# Patient Record
Sex: FEMALE | Race: WHITE | Employment: OTHER | URBAN - METROPOLITAN AREA
[De-identification: names, ages, dates, MRNs, and addresses within clinical notes are randomized per-mention and may not be internally consistent; named-entity substitution may affect disease eponyms.]

---

## 2021-05-05 RX ORDER — CLINDAMYCIN PHOSPHATE 10 UG/ML
LOTION TOPICAL 2 TIMES DAILY
COMMUNITY

## 2021-05-05 RX ORDER — BACLOFEN 20 MG/1
20 TABLET ORAL 4 TIMES DAILY
COMMUNITY
End: 2021-06-30

## 2021-05-05 RX ORDER — CALCIUM CARB/VITAMIN D3/VIT K1 500-500-40
TABLET,CHEWABLE ORAL DAILY
COMMUNITY

## 2021-05-05 RX ORDER — METOCLOPRAMIDE HYDROCHLORIDE 5 MG/5ML
10 SOLUTION ORAL
Status: ON HOLD | COMMUNITY
End: 2021-09-04

## 2021-05-05 RX ORDER — CETIRIZINE HYDROCHLORIDE 10 MG/1
10 TABLET ORAL DAILY
COMMUNITY
End: 2021-06-30

## 2021-05-05 RX ORDER — ALBUTEROL SULFATE 2.5 MG/3ML
2.5 SOLUTION RESPIRATORY (INHALATION) 4 TIMES DAILY
Status: ON HOLD | COMMUNITY
End: 2021-09-03

## 2021-05-05 RX ORDER — MONTELUKAST SODIUM 10 MG/1
10 TABLET ORAL DAILY
COMMUNITY

## 2021-05-05 RX ORDER — MIRTAZAPINE 15 MG/1
15 TABLET, FILM COATED ORAL NIGHTLY
COMMUNITY
End: 2021-06-30

## 2021-05-05 RX ORDER — BUDESONIDE 0.5 MG/2ML
1 INHALANT ORAL 2 TIMES DAILY
Status: ON HOLD | COMMUNITY
End: 2021-09-03

## 2021-05-05 RX ORDER — POLYETHYLENE GLYCOL 3350 17 G/17G
17 POWDER, FOR SOLUTION ORAL 2 TIMES DAILY
COMMUNITY
End: 2021-06-30

## 2021-05-05 RX ORDER — LISINOPRIL 2.5 MG/1
2.5 TABLET ORAL DAILY
COMMUNITY
End: 2021-06-30

## 2021-05-05 RX ORDER — PSYLLIUM HUSK 3.4 G/5.8G
1 POWDER ORAL 2 TIMES DAILY
Status: ON HOLD | COMMUNITY
End: 2021-09-03

## 2021-05-05 RX ORDER — DIPHENHYDRAMINE HCL 12.5MG/5ML
12.5 LIQUID (ML) ORAL NIGHTLY PRN
Status: ON HOLD | COMMUNITY
End: 2021-09-03 | Stop reason: ALTCHOICE

## 2021-05-05 RX ORDER — POTASSIUM CHLORIDE 20MEQ/15ML
20 LIQUID (ML) ORAL DAILY
COMMUNITY
End: 2021-06-30

## 2021-05-05 RX ORDER — MESALAMINE 1000 MG/1
1000 SUPPOSITORY RECTAL
COMMUNITY

## 2021-05-05 RX ORDER — TRAZODONE HYDROCHLORIDE 50 MG/1
100 TABLET ORAL NIGHTLY
COMMUNITY
End: 2021-06-30

## 2021-05-05 RX ORDER — GLYCOPYRROLATE 1 MG/5ML
1 SOLUTION ORAL 3 TIMES DAILY
COMMUNITY

## 2021-05-05 RX ORDER — PRAZOSIN HYDROCHLORIDE 2 MG/1
2 CAPSULE ORAL NIGHTLY
COMMUNITY
End: 2021-06-30

## 2021-06-02 ENCOUNTER — OFFICE VISIT (OUTPATIENT)
Dept: GERIATRIC MEDICINE | Age: 36
End: 2021-06-02
Payer: MEDICAID

## 2021-06-02 DIAGNOSIS — G80.9 INFANTILE CEREBRAL PALSY (HCC): ICD-10-CM

## 2021-06-02 DIAGNOSIS — R13.12 OROPHARYNGEAL DYSPHAGIA: Primary | ICD-10-CM

## 2021-06-02 DIAGNOSIS — F39 MOOD DISORDER (HCC): ICD-10-CM

## 2021-06-02 DIAGNOSIS — K21.9 GASTROESOPHAGEAL REFLUX DISEASE WITHOUT ESOPHAGITIS: ICD-10-CM

## 2021-06-02 DIAGNOSIS — F79 INTELLECTUAL DISABILITY: ICD-10-CM

## 2021-06-02 DIAGNOSIS — K59.00 CONSTIPATION, UNSPECIFIED CONSTIPATION TYPE: ICD-10-CM

## 2021-06-02 DIAGNOSIS — Z93.0 TRACHEOSTOMY DEPENDENT (HCC): ICD-10-CM

## 2021-06-02 DIAGNOSIS — Z99.3 DEPENDENCE ON WHEELCHAIR: ICD-10-CM

## 2021-06-02 PROCEDURE — 99310 SBSQ NF CARE HIGH MDM 45: CPT | Performed by: PHYSICIAN ASSISTANT

## 2021-06-23 ENCOUNTER — VIRTUAL VISIT (OUTPATIENT)
Dept: GERIATRIC MEDICINE | Age: 36
End: 2021-06-23
Payer: MEDICAID

## 2021-06-23 DIAGNOSIS — Z93.1 PRESENCE OF EXTERNALLY REMOVABLE PERCUTANEOUS ENDOSCOPIC GASTROSTOMY (PEG) TUBE (HCC): ICD-10-CM

## 2021-06-23 DIAGNOSIS — R11.15 EMESIS, PERSISTENT: Primary | ICD-10-CM

## 2021-06-23 PROCEDURE — 99308 SBSQ NF CARE LOW MDM 20: CPT | Performed by: PHYSICIAN ASSISTANT

## 2021-06-25 ENCOUNTER — OFFICE VISIT (OUTPATIENT)
Dept: GERIATRIC MEDICINE | Age: 36
End: 2021-06-25
Payer: MEDICAID

## 2021-06-25 DIAGNOSIS — R10.84 GENERALIZED ABDOMINAL PAIN: Primary | ICD-10-CM

## 2021-06-25 PROCEDURE — 99308 SBSQ NF CARE LOW MDM 20: CPT | Performed by: PHYSICIAN ASSISTANT

## 2021-06-30 PROBLEM — B35.1 ONYCHOMYCOSIS: Status: ACTIVE | Noted: 2019-05-30

## 2021-06-30 PROBLEM — R45.851 SUICIDAL IDEATIONS: Status: ACTIVE | Noted: 2021-06-16

## 2021-06-30 PROBLEM — K85.90 PANCREATITIS: Status: ACTIVE | Noted: 2017-05-22

## 2021-06-30 PROBLEM — K64.9 UNSPECIFIED HEMORRHOIDS: Status: ACTIVE | Noted: 2020-10-16

## 2021-06-30 PROBLEM — Z88.0 ALLERGY STATUS TO PENICILLIN: Status: ACTIVE | Noted: 2020-10-16

## 2021-06-30 PROBLEM — Z93.0 TRACHEOSTOMY STATUS (HCC): Status: ACTIVE | Noted: 2020-10-16

## 2021-06-30 PROBLEM — Z87.19 PERSONAL HISTORY OF OTHER DISEASES OF THE DIGESTIVE SYSTEM: Status: ACTIVE | Noted: 2020-10-16

## 2021-06-30 PROBLEM — K59.00 CONSTIPATION: Status: ACTIVE | Noted: 2019-06-13

## 2021-06-30 PROBLEM — Z87.440 PERSONAL HISTORY OF URINARY (TRACT) INFECTIONS: Status: ACTIVE | Noted: 2020-10-16

## 2021-06-30 PROBLEM — R63.4 UNINTENTIONAL WEIGHT LOSS: Status: ACTIVE | Noted: 2021-01-20

## 2021-06-30 PROBLEM — L84 FOOT CALLUS: Status: ACTIVE | Noted: 2019-05-30

## 2021-06-30 PROBLEM — F39 MOOD DISORDER (HCC): Status: ACTIVE | Noted: 2020-02-25
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
## 2021-06-30 PROBLEM — F32.9 MAJOR DEPRESSIVE DISORDER, SINGLE EPISODE, UNSPECIFIED: Status: ACTIVE | Noted: 2021-03-05

## 2021-06-30 PROBLEM — Z99.3 DEPENDENCE ON WHEELCHAIR: Status: ACTIVE | Noted: 2020-10-16

## 2021-06-30 PROBLEM — R11.2 NAUSEA AND VOMITING: Status: ACTIVE | Noted: 2019-06-13

## 2021-06-30 PROBLEM — G80.9 INFANTILE CEREBRAL PALSY (HCC): Status: ACTIVE | Noted: 2021-03-05

## 2021-06-30 PROBLEM — K62.3 RECTAL PROLAPSE: Status: ACTIVE | Noted: 2020-10-16

## 2021-06-30 RX ORDER — FAMOTIDINE 40 MG/1
TABLET, FILM COATED ORAL
COMMUNITY
Start: 2020-09-09

## 2021-06-30 RX ORDER — KETOCONAZOLE 20 MG/G
CREAM TOPICAL 2 TIMES DAILY
COMMUNITY
Start: 2021-05-24

## 2021-06-30 RX ORDER — DOCUSATE SODIUM 50 MG/5ML
100 LIQUID ORAL 2 TIMES DAILY
COMMUNITY
Start: 2020-09-09 | End: 2021-06-30

## 2021-06-30 RX ORDER — MIRTAZAPINE 15 MG/1
15 TABLET, FILM COATED ORAL NIGHTLY
COMMUNITY
Start: 2021-03-09

## 2021-06-30 RX ORDER — POTASSIUM CHLORIDE 40 MEQ/30ML
15 LIQUID ORAL DAILY
COMMUNITY

## 2021-06-30 RX ORDER — MULTIVITAMIN
TABLET ORAL
Status: ON HOLD | COMMUNITY
Start: 2021-03-09 | End: 2021-09-03 | Stop reason: ALTCHOICE

## 2021-06-30 RX ORDER — DIPHENHYDRAMINE HCL 12.5MG/5ML
12.5 LIQUID (ML) ORAL NIGHTLY
Status: ON HOLD | COMMUNITY
Start: 2020-09-09 | End: 2021-09-03

## 2021-06-30 RX ORDER — BACLOFEN 20 MG/1
TABLET ORAL
Status: ON HOLD | COMMUNITY
Start: 2020-09-09 | End: 2021-09-07 | Stop reason: HOSPADM

## 2021-06-30 RX ORDER — POLYETHYLENE GLYCOL 3350 17 G/17G
17 POWDER, FOR SOLUTION ORAL 2 TIMES DAILY
Status: ON HOLD | COMMUNITY
Start: 2020-09-09 | End: 2021-09-03

## 2021-06-30 RX ORDER — SENNA PLUS 8.6 MG/1
8.6 TABLET ORAL NIGHTLY
Status: ON HOLD | COMMUNITY
Start: 2021-03-10 | End: 2021-09-03

## 2021-06-30 RX ORDER — FLUTICASONE PROPIONATE 50 MCG
1 SPRAY, SUSPENSION (ML) NASAL DAILY
COMMUNITY
Start: 2020-09-09

## 2021-06-30 RX ORDER — RISPERIDONE 2 MG/1
2 TABLET, FILM COATED ORAL NIGHTLY
Status: ON HOLD | COMMUNITY
Start: 2021-03-09 | End: 2021-09-07 | Stop reason: HOSPADM

## 2021-06-30 RX ORDER — FLUOXETINE 10 MG/1
30 CAPSULE ORAL DAILY
Status: ON HOLD | COMMUNITY
Start: 2021-03-09 | End: 2021-09-07 | Stop reason: HOSPADM

## 2021-06-30 RX ORDER — PRAZOSIN HYDROCHLORIDE 2 MG/1
2 CAPSULE ORAL NIGHTLY
COMMUNITY
Start: 2021-03-09

## 2021-06-30 RX ORDER — MESALAMINE 1000 MG/1
SUPPOSITORY RECTAL
COMMUNITY
Start: 2021-01-07 | End: 2021-06-30

## 2021-06-30 RX ORDER — HYDROXYZINE HYDROCHLORIDE 25 MG/1
25 TABLET, FILM COATED ORAL 3 TIMES DAILY PRN
Status: ON HOLD | COMMUNITY
Start: 2020-10-01 | End: 2021-09-03

## 2021-06-30 RX ORDER — ERGOCALCIFEROL (VITAMIN D2) 200 MCG/ML
1040 DROPS ORAL DAILY
Status: ON HOLD | COMMUNITY
Start: 2020-09-09 | End: 2021-09-04

## 2021-06-30 RX ORDER — ALBUTEROL SULFATE 2.5 MG/3ML
2.5 SOLUTION RESPIRATORY (INHALATION) EVERY 6 HOURS PRN
Status: ON HOLD | COMMUNITY
End: 2021-09-03

## 2021-06-30 RX ORDER — TRAZODONE HYDROCHLORIDE 50 MG/1
100 TABLET ORAL NIGHTLY
COMMUNITY
Start: 2021-03-09

## 2021-06-30 RX ORDER — LISINOPRIL 2.5 MG/1
2.5 TABLET ORAL DAILY
COMMUNITY
Start: 2020-09-09

## 2021-06-30 RX ORDER — ESCITALOPRAM OXALATE 10 MG/1
10 TABLET ORAL DAILY
Status: ON HOLD | COMMUNITY
End: 2021-09-03

## 2021-06-30 RX ORDER — CETIRIZINE HYDROCHLORIDE 10 MG/1
TABLET ORAL
COMMUNITY
Start: 2021-04-06 | End: 2021-07-04

## 2021-06-30 NOTE — PROGRESS NOTES
have ongoing IDD, however she is able to answer yes and no questions very well. Does seem alert and oriented x3 upon prompting a question. Limited cognition. Will need prison care via POA for care and financial decisions. Patient Active Problem List   Diagnosis    Acute blood loss anemia    Allergy status to penicillin    Constipation    Dependence on wheelchair    DVT prophylaxis    Dysphagia    Exotropia of right eye    Foot callus    Gastrostomy status (HCC)    GERD (gastroesophageal reflux disease)    GI bleeding    [facility]ile [facility] palsy ([facility])    Intellectual disability    Major depressive disorder, single episode, unspecified    Malnutrition of mild degree ([facility])    MDRO (multiple drug resistant organisms) resistance    Mood disorder ([facility])    Nausea and vomiting    Obstructive sleep apnea (adult) (pediatric)    Onychomycosis    PEG tube malfunction ([facility])    Pancreatitis    Personal history of other diseases of the digestive system    Personal history of urinary (tract) infections    Intentional self-harm by unspecified sharp object, initial encounter ([address]    Rectal prolapse    Squamous cell papilloma of anal canal    Suicidal ideations    Tracheostomy dependent ([address]    [facility] status [facility]    Underweight    Unintentional weight loss    Unspecified hemorrhoids     No past medical history on file. No past surgical history on file.   Social History     Socioeconomic History    Marital status: Unknown     Spouse name: Not on file    Number of children: Not on file    Years of education: Not on file    Highest education level: Not on file   Occupational History    Not on file   Tobacco Use    Smoking status: Not on file   Substance and Sexual Activity    Alcohol use: Not on file    Drug use: Not on file    Sexual activity: Not on file   Other Topics Concern    Not on file   Social History Narrative    Not on file     Social Determinants of Health Financial Resource Strain:     Difficulty of Paying Living Expenses:    Food Insecurity:     Worried About Running Out of Food in the Last Year:     [address] in the Last Year:    Transportation Needs:     Lack of Transportation (Medical):  Lack of Transportation (Non-Medical):    Physical Activity:     Days of Exercise per Week:     Minutes of Exercise per Session:    Stress:     Feeling of Stress :    Social Connections:     Frequency of Communication with Friends and Family:     Frequency of Social Gatherings with Friends and Family:     Attends Congregation Services:     Active Member of Clubs or Organizations:     Attends Club or Organization Meetings:     Marital Status:    Intimate Partner Violence:     Fear of Current or Ex-Partner:     Emotionally Abused:     Physically Abused:     Sexually Abused:      No family history on file. Allergies   Allergen Reactions    Amoxicillin-Pot Clavulanate      Other reaction(s): Unknown    Ibuprofen      Other reaction(s): Unknown, Unknown    Penicillins Hives     Other reaction(s): Unknown    Ondansetron Rash     blotchy arrythmia noted to right arm after giving IV zofran 4/24/2015  blotchy arrythmia noted to right arm after giving IV zofran 4/24/2015      Vancomycin Rash    Amoxicillin     Eggs-Apples-Oats [Alimentum]     Food Other (See Comments)    Milk-Related Compounds     Orange Juice [Orange Oil]     Strawberry (Diagnostic)          Review of Systems   Unable to perform ROS: Patient nonverbal       Objective:     /74  Pulse 80  Temp 97.7 °F (Temporal)  Resp 20  Wt 81 lbs    Physical Exam  Constitutional:       General: She is not in acute distress. Appearance: She is not ill-appearing or diaphoretic. Comments: Thin, low muscle tone   HENT:      Head: Normocephalic and atraumatic. Nose: Nose normal. No congestion or rhinorrhea.       Mouth/Throat:      Mouth: Mucous membranes are moist.      Pharynx: types were placed in this encounter. No orders of the defined types were placed in this encounter. 1.  Continue current diet orders. Will reduce to PEG tube feedings if necessary if oropharyngeal dysphagia becomes problematic. 2.  No new changes to medications. Continue famotidine as ordered. 3.  Full jail care. 4.  Utilize as needed's as needed. Stool softeners and MiraLAX. 5.  Patient is wheelchair-bound but able to ambulate on her own power. Assist as needed. 6.  See #3  7. No new acute issues or behaviors to interrupt care at this time. Easily redirected. Monitor and utilize psych 360 service as needed. 8.  Continue current tracheostomy care. Maintain patency. Utilize normal saline to irrigate and suction as needed. Monitor SPO2 daily. No follow-ups on file. Side effects, adverse effects of the medication prescribed today, as well as treatment plan and result expectations have been discussed withthe patient who expresses understanding and desires to proceed.     [facility]

## 2021-07-22 ENCOUNTER — OFFICE VISIT (OUTPATIENT)
Dept: GERIATRIC MEDICINE | Age: 36
End: 2021-07-22
Payer: MEDICAID

## 2021-07-22 DIAGNOSIS — R13.12 OROPHARYNGEAL DYSPHAGIA: ICD-10-CM

## 2021-07-22 DIAGNOSIS — K21.9 GASTROESOPHAGEAL REFLUX DISEASE WITHOUT ESOPHAGITIS: ICD-10-CM

## 2021-07-22 DIAGNOSIS — Z87.19 HISTORY OF GASTROINTESTINAL BLEEDING: ICD-10-CM

## 2021-07-22 DIAGNOSIS — Z93.0 TRACHEOSTOMY IN PLACE (HCC): Primary | ICD-10-CM

## 2021-07-22 PROCEDURE — 99309 SBSQ NF CARE MODERATE MDM 30: CPT | Performed by: PHYSICIAN ASSISTANT

## 2021-07-28 DIAGNOSIS — R13.10 DYSPHAGIA, UNSPECIFIED TYPE: Primary | ICD-10-CM

## 2021-08-01 NOTE — PROGRESS NOTES
Gertrude Sepulveda is a 43 y.o. female evaluated via telephone on 2021. Consent:  She and/or health care decision maker is aware that that she may receive a bill for this telephone service, depending on her insurance coverage, and has provided verbal consent to proceed: Yes      Documentation:  I communicated with the patient and/or health care decision maker about     PATIENT: Linette Berger  : 1985  DOS:   A [facility] serving [place] and [address]  Patient having acute emesis noted today and seems to have some nausea as well. She has been unable to establish dialog, but can answer some questions when asked yes or no questions. She is on a pureed dietcurrently and is having emesis afterwards. Patient is eating a modified diet with supplementation  via PEG tube to enhance protein intake however, she is having increased vomitus and some mildnausea. We will add Zofran 4 mg p.o. q.6 hours x3 days. ADDENDUM: Patient nurse did call back a few hours later stating that patient does actually ve a Zofran allergy and is currently on baclofen. Stated at this time we will monitor patient. If she has continued emesis and nausea, we will follow up with GI. She is able to keep most of her meds down. She takes her meds p.o. If patient has another episode of emesis or if she cannot keep her meds down, we will do further imaging evaluation. Electronically Signed By: Sukhdev Chavez PA-C on 2021 16:32:57  ______________________________  Sukhdev Chavez PA-C [id number]  D: 2021 16:43:07  T: 2021 11:23:46  cc: - Ryder. Details of this discussion including any medical advice provided: see above      I affirm this is a Patient Initiated Episode with a Patient who has not had a related appointment within my department in the past 7 days or scheduled within the next 24 hours.     Patient identification was verified at the start of the visit: Yes    Total Time: minutes: 11-20 minutes    The visit was conducted pursuant to the emergency declaration under the [address] [place] [address] and the [employer] and [employer] General Act. Patient identification was verified, and a caregiver was present when appropriate. The patient was located in a state where the provider was credentialed to provide care.     Note: not billable if this call serves to triage the patient into an appointment for the relevant concern      Sean Mcneil

## 2021-08-03 NOTE — PROGRESS NOTES
Jakub Hameed     : 1985 DOS:     A [facility] serving [address] and [address]    Vital signs reviewed at facility, within normal limits. HPI: Patient having difficulty with bowel movements and constipation over the past few days. Has had some mild nausea as well. Is on PEG tube feeding due to very severe dysphagia and  tracheostomy in place. Patient believes it is from being hungry and not necessarily from  constipation, although it is impossible to discern exactly as patient is nonverbal. She can answer  yes or no questions and makes most of her needs known fairly well via hand signs and repeat  questioning. She has not had a bowel movement in 2 days despite milk of mag. We will  improve enema orders today. She has not been eating very much due to some abdominal pain as  well. Evaluated KUB yesterday showing normal bowel gas pattern and nonobstructive at that. We will continue some milk of mag and an enema today if no bowel movement. Otherwise, we  will continue monitoring for pain. We will continue doing fluid via PEG tube as ordered. Currently holding PEG tube feed for the time being. We will restart as soon as patient has  adequate BM. MEDICATIONS: Reviewed. ALLERGIES: Reviewed. REVIEW OF SYSTEMS: See HPI. Some mild increase in weakness and fatigue secondary to  holding PEG tube. There was no headache, confusion, reportedly. No BRBPR. No urinary  complaints. Denies any cardiopulmonary complaints as well. Remaining 14-point ROS  Unremarkable. PHYSICAL EXAMINATION: GENERAL: Poor-to-fair hygiene, secondary to medical  conditions/tracheostomy. HEENT: Pupils equal, round, react to light equally. MMM, no  erythema or exudate. Does have tracheostomy with some mild discharge noted from site. Clear  white discharge. Trachea is midline. CARDIOPULMONARY: RRR. No leg edema. Lung  sounds are clear.  However, difficulty auscultating due to tracheostomy, aberrant noise. ABDOMINAL: Slightly hypoactive bowel sounds; however, appear to be more or less normal.    MENTAL STATUS: Patient is awake, alert, and oriented 2/3, presumably unable to establish  dialog due to patient being nonverbal.    ASSESSMENT AND PLAN:    1. Abdominal pain-KUB appears to be within normal limits. We will do further imaging if  no response to MOM or enema. Consultation-we will add enema orders. We will do 1  stat this evening if no bowel movement within the next 1 to 2 hours. Continue holding  PEG tube. Utilize flushes as needed for hydration.     [address] [facility]  [address]  [facility]  Phone: [phone]  Fax: [phone]    PATIENT: Cuate Higgins    : 1985  DOS:     [address] [facility] serving [place] and [place]      Electronically Signed By: Noel Banks PA-C on 2021 10:00:15  ______________________________  Noel Banks PA-C  [id number]  D: 2021 18:41:04  T: 2021 15:34:19  cc: - Kb Mail

## 2021-08-17 NOTE — PROGRESS NOTES
Subjective:      Patient ID: Tracey Tineo is a pleasant 43 y.o. female who presents today for:  No chief complaint on file. Patient seen today for monthly follow-up visit. Patient is a fairly new addition to breast care [facility]. Vital signs today 97.3 degrees F, /84, SPO2 95% on room air, pulse 110, respirations 18. Weight 79.8, in June 76.6 pounds. Patient has history of going tracheostomy. Currently not using tubing. Has issues with mishandling tracheostomy and prodding with her so fingers. Has to redirected frequently. Patient is afebrile and has no new fever or evidence of infection. Patient does have some moderate amount of thickened sputum coming from area. She is an increase dysphasia risk for discover this morning more as of late. We will do modified barium swallow. Discussed having her be n.p.o. and PEG tube, and patient was decently opposed to her body language, motioning that she would never want this. We reassured her that it was her choice and she is alert and oriented 03 and can make her own decision. Patient is otherwise doing well without acute issue. No new GI bleeding or GERD related symptoms. Denies indigestion or pain with eating her current modified puréed diet. .  No new bowel or bladder issues reportedly. We will continue monitoring patient for any new issues. In the meantime we will weightbearing swallow and adjust diet as needed. Patient has refused PEG tube at this time.       Patient Active Problem List   Diagnosis    Acute blood loss anemia    Allergy status to penicillin    Constipation    Dependence on wheelchair    DVT prophylaxis    Dysphagia    Exotropia of right eye    Foot callus    [facility]astrostomy status ([facility])    GERD (gastroesophageal reflux disease)    GI bleeding    Infantile cerebral palsy (HCC)    Intellectual disability    Major depressive disorder, single episode, unspecified    Mal[facility] mild degree [facility]    MDRO (multiple drug resistant organisms) resistance    Mood disorder (HCC)    Nausea and vomiting    Obstructive sleep apnea (adult) (pediatric)    Onychomycosis    PEG tube malfunction (HCC)    Pancreatitis    Personal history of other diseases of the digestive system    Personal history of urinary (tract) infections    Intentional self-harm by unspecified sharp object, initial encounter ([facility]    Rectal prolapse    Squamous cell papilloma of anal canal    Suicidal ideations    Tracheostomy dependent ([facility]    Tracheostomy status ([facility]    Underweight    Unintentional weight loss    Unspecified hemorrhoids     No past medical history on file. No past surgical history on file. Social History     Socioeconomic History    Marital status: Unknown     Spouse name: Not on file    Number of children: Not on file    Years of education: Not on file    Highest education level: Not on file   Occupational History    Not on file   Tobacco Use    Smoking status: Not on file   Substance and Sexual Activity    Alcohol use: Not on file    Drug use: Not on file    Sexual activity: Not on file   Other Topics Concern    Not on file   Social History Narrative    Not on file     Social Determinants of Health     Financial Resource Strain:     Difficulty of Paying Living Expenses:    Food Insecurity:     Worried About Running Out of Food in the Last Year:     [address] in the Last Year:    Transportation Needs:     Lack of Transportation (Medical):      Lack of Transportation (Non-Medical):    Physical Activity:     Days of Exercise per Week:     Minutes of Exercise per Session:    Stress:     Feeling of Stress :    Social Connections:     Frequency of Communication with Friends and Family:     Frequency of Social Gatherings with Friends and Family:     Attends Sikh Services:     Active Member of Clubs or Organizations:     Attends Club or Organization Meetings:     Marital Status:    Intimate Partner Violence:     Fear of Current or Ex-Partner:     Emotionally Abused:     Physically Abused:     Sexually Abused:      No family history on file. Allergies   Allergen Reactions    Amoxicillin-Pot Clavulanate      Other reaction(s): Unknown    Ibuprofen      Other reaction(s): Unknown, Unknown    Penicillins Hives     Other reaction(s): Unknown    Ondansetron Rash     blotchy arrythmia noted to right arm after giving IV zofran 4/24/2015  blotchy arrythmia noted to right arm after giving IV zofran 4/24/2015      Vancomycin Rash    Amoxicillin     Eggs-Apples-Oats [Alimentum]     Food Other (See Comments)    Milk-Related Compounds     Orange Juice [Orange Oil]     Strawberry (Diagnostic)          Review of Systems  Monitor established patient ability to establish dialogue. Objective:   Vital signs: Patient vitals in HPI. Physical Exam  Vitals reviewed. Constitutional:       General: She is not in acute distress. Appearance: She is not ill-appearing or diaphoretic. Comments: Thin, low muscle tone   HENT:      Head: Normocephalic and atraumatic. Nose: Nose normal. No congestion or rhinorrhea. Mouth/Throat:      Mouth: Mucous membranes are moist.      Pharynx: Oropharynx is clear. Comments: Drooling profusely  Does have presence of tracheostomy, no tracheostomy tubing present. Moderate amount of thick yellow sputum noted  Eyes:      General: No scleral icterus. Right eye: No discharge. Left eye: No discharge. Extraocular Movements: Extraocular movements intact. Conjunctiva/sclera: Conjunctivae normal.      Pupils: Pupils are equal, round, and reactive to light. Cardiovascular:      Rate and Rhythm: Normal rate and regular rhythm. Pulses: Normal pulses. Heart sounds: Normal heart sounds. Pulmonary:      Effort: Pulmonary effort is normal. No respiratory distress. Breath sounds: No wheezing.       Comments: Tracheostomy noise throughout exam.  Unable to ascertain lung exam accurately. Moderate amount of sputum coming from tracheostomy site. Chest:      Chest wall: No tenderness. Abdominal:      General: Abdomen is flat. Bowel sounds are normal. There is no distension. Comments: PEG tube mild redness around site; no discharge noted. Genitourinary:     Comments: Deferred  Musculoskeletal:         General: No tenderness. Normal range of motion. Cervical back: Normal range of motion and neck supple. No rigidity or tenderness. Right lower leg: No edema. Left lower leg: No edema. Skin:     General: Skin is warm and dry. Findings: No bruising. Neurological:      Mental Status: She is alert. Mental status is at baseline. Motor: Weakness present. Gait: Gait abnormal (Wheelchair use routine). Comments: Unable to accurately ascertain a & O   Psychiatric:         Mood and Affect: Mood normal.      Comments: Sexually aggressive behavior; redirectable           Assessment:       Diagnosis Orders   1. Tracheostomy in place [facility])     2. Oropharyngeal dysphagia     3. History of gastrointestinal bleeding     4. Gastroesophageal reflux disease without esophagitis           Plan:      No orders of the defined types were placed in this encounter. No orders of the defined types were placed in this encounter. 1.  Patent and SPO2 within normal limits. Patient will refuse to keep tracheostomy equipment in place. Continue monitoring. 2.  Continue puréed diet. Will do barium swallow to evaluate. Patient refuses PEG tube. 3.  No new bleeding. Monitor for change in stool. 4.  No new changes. No follow-ups on file. Side effects, adverse effects of the medication prescribed today, as well as treatment plan and result expectations have beendiscussed with the patient who expresses understanding and desires to proceed.     Sean Aguiar

## 2021-08-20 ENCOUNTER — INITIAL CONSULT (OUTPATIENT)
Dept: GERIATRIC MEDICINE | Age: 36
End: 2021-08-20
Payer: MEDICAID

## 2021-08-20 DIAGNOSIS — F33.9 RECURRENT MAJOR DEPRESSIVE DISORDER, REMISSION STATUS UNSPECIFIED (HCC): ICD-10-CM

## 2021-08-20 DIAGNOSIS — R13.12 OROPHARYNGEAL DYSPHAGIA: Primary | ICD-10-CM

## 2021-08-20 DIAGNOSIS — K94.23 PEG TUBE MALFUNCTION (HCC): ICD-10-CM

## 2021-08-20 DIAGNOSIS — F39 MOOD DISORDER (HCC): ICD-10-CM

## 2021-08-20 PROCEDURE — 99309 SBSQ NF CARE MODERATE MDM 30: CPT | Performed by: PHYSICIAN ASSISTANT

## 2021-08-30 ENCOUNTER — OFFICE VISIT (OUTPATIENT)
Dept: GASTROENTEROLOGY | Age: 36
End: 2021-08-30
Payer: MEDICAID

## 2021-08-30 VITALS
RESPIRATION RATE: 12 BRPM | SYSTOLIC BLOOD PRESSURE: 110 MMHG | DIASTOLIC BLOOD PRESSURE: 70 MMHG | OXYGEN SATURATION: 98 % | HEART RATE: 84 BPM

## 2021-08-30 DIAGNOSIS — K62.5 BLEEDING PER RECTUM: Primary | ICD-10-CM

## 2021-08-30 PROCEDURE — 99204 OFFICE O/P NEW MOD 45 MIN: CPT | Performed by: INTERNAL MEDICINE

## 2021-08-30 RX ORDER — PSYLLIUM SEED
PACKET (EA) ORAL
Status: ON HOLD | COMMUNITY
End: 2021-09-03

## 2021-08-30 RX ORDER — METRONIDAZOLE 500 MG/1
TABLET ORAL
Status: ON HOLD | COMMUNITY
Start: 2021-08-01 | End: 2021-09-04

## 2021-08-30 NOTE — PROGRESS NOTES
Living Expenses:    Food Insecurity:     Worried About [address] [facility] in the Last Year:     [address] [facility] [address] in the Last Year:    Transportation Needs:     Lack of Transportation (Medical):  Lack of Transportation (Non-Medical):    Physical Activity:     Days of Exercise per Week:     Minutes of Exercise per Session:    Stress:     Feeling of Stress :    Social Connections:     Frequency of Communication with Friends and Family:     Frequency of Social Gatherings with Friends and Family:     Attends Sikh Services:     Active Member of Clubs or Organizations:     Attends Club or Organization Meetings:     Marital Status:    Intimate Partner Violence:     Fear of Current or Ex-Partner:     Emotionally Abused:     Physically Abused:     Sexually Abused:      No family history on file. Allergies   Allergen Reactions    Amoxicillin-Pot Clavulanate      Other reaction(s): Unknown    Ibuprofen      Other reaction(s): Unknown, Unknown    Penicillins Hives     Other reaction(s): Unknown    Ondansetron Rash     blotchy arrythmia noted to right arm after giving IV zofran 4/24/2015  blotchy arrythmia noted to right arm after giving IV zofran 4/24/2015      Vancomycin Rash    Amoxicillin     Eggs-Apples-Oats [Alimentum]     Food Other (See Comments)    Milk-Related Compounds     Orange Juice [Orange Oil]     Strawberry (Diagnostic)          Review of Systems   Unable to perform ROS: Patient nonverbal       Objective:   /70 (Site: Right Upper Arm, Position: Sitting, Cuff Size: Small Adult)   Pulse 84   Resp 12   SpO2 98%     Physical Exam  Constitutional:       General: She is not in acute distress. Appearance: She is well-developed. HENT:      Head: Normocephalic and atraumatic. Eyes:      Conjunctiva/sclera: Conjunctivae normal.      Pupils: Pupils are equal, round, and reactive to light. Cardiovascular:      Rate and Rhythm: Normal rate and regular rhythm.       Heart sounds: Normal heart sounds. Pulmonary:      Effort: Pulmonary effort is normal. No respiratory distress. Breath sounds: Normal breath sounds. No wheezing or rales. Comments: Trached  Abdominal:      General: Bowel sounds are normal. There is no distension. Palpations: Abdomen is soft. Abdomen is not rigid. There is no hepatomegaly, splenomegaly or mass. Tenderness: There is no abdominal tenderness. There is no guarding or rebound. Comments: PEG in place   Musculoskeletal:         General: No tenderness or deformity. Normal range of motion. Cervical back: Neck supple. Skin:     Coloration: Skin is not pale. Findings: No erythema or rash. Neurological:      Mental Status: She is alert and oriented to person, place, and time. Comments: Wheelchair         Laboratory, Pathology, Radiology reviewed in detail with relevantimportant investigations summarized below:  No results found for: WBC, HGB, HCT, MCV, PLT . No results found for: ALT, AST, GGT, ALKPHOS, BILITOT    No results found. No results found for: IRON, TIBC, FERRITIN  No results found for: INR  No components found for: ACUTEHEPATITISSCREEN  No components found for: CELIACPANEL  No components found for: STOOLCULTURE, C.DIFF, STOOLOVAPARASITE, STOOLLEUCOCYTE    Date: 02/22/21  MRN: [id number]      DUODENUM: Bulb and descending portion appeared normal.    STOMACH: Internal balloon PEG in good position in stomach. No associated erythema or ulceration at the insertion site. Pyloric channel, antrum, body, fundus, and cardia, including retroflexed views, appeared normal.     ESOPHAGUS: Tortuous distal esophagus with 1-2 cm of esophageal narrowing (approximately 8 mm diameter) at the GE junction that resisted the passage of the adult gastroscope. No erythema or ulceration appreciated at the GE junction.  Proximal to the distal tortuosity there was some patchy erythema that was seen on initial insertion, but then obscured by mild iatrogenic mucosa disruption from mild retching and gastroscope passage. Switched to the ultrathin scope (5 mm diameter), which was able to easily traverse the stricture. Switched back to adult gastroscope and was able to pass beyond the stricture with careful maneuvering and additional pressure. The distal esophageal narrowing was dilated with CRE balloon (8 mm then 9 mm) for 30 seconds each with mild to moderate resistance. Site evaluate with mild mucosa disruption. Diaphragmatic hiatus was 38 cm from incisors and GE junction (upper margin of gastric folds) was 38 cm from incisors. Squamocolumnar junction was 38 cm from incisors. IMPRESSION:  - Distal esophageal stricture s/p CRE balloon dilation (8 mm - 9 mm). This is likely contributing to her emesis. - History of s/p PEG placement    RECOMMENDATIONS:   - Repeat EGD with balloon dilation with general anesthesia in 6-8 weeks  - CT chest with oral and IV contrast to further evaluate submucosa and periesophageal anatomy  - Continue with dysphagia diet and aspiration precautions  - Continue current dose of famotidine  - Follow up with Joelle Peña CNP in GI clinic in 3-4 weeks  - Follow up with PCP      Assessment:    1-Vomiting/history of oropharyngeal dysphagia  Patient has an established oropharyngeal dysphagia and was on modified diet with supplemental  G-tube feed to reach nutritional goals. Of note patient, does have history of esophageal stricture relatively narrowed and had CRE balloon dilation 8 mm at [facility]. The stricture thought to contribute to patient's emesis. Also had multiple issues regarding PEG malfunction/dysfunction. Discussed with nursing home staff, patient will need to continue follow-up with tertiary center for multidisciplinary comprehensive assessment and to avoid fragmented care  2-Blood per rectum  Patient is established with colorectal surgery at [facility]. Had squamous papilloma on previous colonoscopy.   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 Patient will need to continue follow-up with colorectal surgery as an established with [facility] to avoid fragmented care that was conveyed to nursing home staff accordingly  3-Associated medical conditions include but not limited to cerebral palsy, dysphagia, tracheostomy with history of chronic pancreatitis, muscle spasm, proctitis, prolapsed rectum, esophageal stricture, history of ulcerative esophagitis, history of laryngectomy, history of constipation, history of rectal ulcer. ..... Caio Chapman Return if symptoms worsen or fail to improve.   Patient to follow with a tertiary center [facility] given the complexity of past medical history, established physicians and care at [facility] and to avoid fragmented care          Wan Ferraro MD

## 2021-09-03 ENCOUNTER — HOSPITAL ENCOUNTER (INPATIENT)
Age: 36
LOS: 4 days | Discharge: HOME | DRG: 384 | End: 2021-09-07
Attending: STUDENT IN AN ORGANIZED HEALTH CARE EDUCATION/TRAINING PROGRAM | Admitting: INTERNAL MEDICINE
Payer: MEDICAID

## 2021-09-03 DIAGNOSIS — R45.851 SUICIDAL IDEATION: Primary | ICD-10-CM

## 2021-09-03 LAB
ACETAMINOPHEN LEVEL: <5 UG/ML (ref 10–30)
ALBUMIN SERPL-MCNC: 3.8 G/DL (ref 3.5–4.6)
ALP BLD-CCNC: 88 U/L (ref 40–130)
ALT SERPL-CCNC: 14 U/L (ref 0–33)
ANION GAP SERPL CALCULATED.3IONS-SCNC: 14 MEQ/L (ref 9–15)
AST SERPL-CCNC: 28 U/L (ref 0–35)
BASOPHILS ABSOLUTE: 0 K/UL (ref 0–0.2)
BASOPHILS RELATIVE PERCENT: 0.6 %
BILIRUB SERPL-MCNC: 0.3 MG/DL (ref 0.2–0.7)
BUN BLDV-MCNC: 12 MG/DL (ref 6–20)
CALCIUM SERPL-MCNC: 9.8 MG/DL (ref 8.5–9.9)
CHLORIDE BLD-SCNC: 100 MEQ/L (ref 95–107)
CK MB: 9.1 NG/ML (ref 0–3.8)
CO2: 23 MEQ/L (ref 20–31)
CREAT SERPL-MCNC: 0.55 MG/DL (ref 0.5–0.9)
CREATINE KINASE-MB INDEX: 3.5 % (ref 0–3.5)
EOSINOPHILS ABSOLUTE: 0.3 K/UL (ref 0–0.7)
EOSINOPHILS RELATIVE PERCENT: 4.2 %
ETHANOL PERCENT: NORMAL G/DL
ETHANOL: <10 MG/DL (ref 0–0.08)
GFR AFRICAN AMERICAN: >60
GFR NON-AFRICAN AMERICAN: >60
GLOBULIN: 3 G/DL (ref 2.3–3.5)
GLUCOSE BLD-MCNC: 95 MG/DL (ref 70–99)
HCT VFR BLD CALC: 40.5 % (ref 37–47)
HEMOGLOBIN: 13.7 G/DL (ref 12–16)
LYMPHOCYTES ABSOLUTE: 1.1 K/UL (ref 1–4.8)
LYMPHOCYTES RELATIVE PERCENT: 14.2 %
MCH RBC QN AUTO: 31.7 PG (ref 27–31.3)
MCHC RBC AUTO-ENTMCNC: 33.9 % (ref 33–37)
MCV RBC AUTO: 93.4 FL (ref 82–100)
MONOCYTES ABSOLUTE: 0.5 K/UL (ref 0.2–0.8)
MONOCYTES RELATIVE PERCENT: 6.2 %
NEUTROPHILS ABSOLUTE: 5.7 K/UL (ref 1.4–6.5)
NEUTROPHILS RELATIVE PERCENT: 74.8 %
PDW BLD-RTO: 13.4 % (ref 11.5–14.5)
PLATELET # BLD: 282 K/UL (ref 130–400)
POTASSIUM SERPL-SCNC: 4.4 MEQ/L (ref 3.4–4.9)
RBC # BLD: 4.33 M/UL (ref 4.2–5.4)
SALICYLATE, SERUM: <0.3 MG/DL (ref 15–30)
SARS-COV-2, NAAT: NOT DETECTED
SODIUM BLD-SCNC: 137 MEQ/L (ref 135–144)
TOTAL CK: 258 U/L (ref 0–170)
TOTAL PROTEIN: 6.8 G/DL (ref 6.3–8)
TSH SERPL DL<=0.05 MIU/L-ACNC: 1.4 UIU/ML (ref 0.44–3.86)
WBC # BLD: 7.7 K/UL (ref 4.8–10.8)

## 2021-09-03 PROCEDURE — 6360000002 HC RX W HCPCS: Performed by: INTERNAL MEDICINE

## 2021-09-03 PROCEDURE — 94761 N-INVAS EAR/PLS OXIMETRY MLT: CPT

## 2021-09-03 PROCEDURE — 80143 DRUG ASSAY ACETAMINOPHEN: CPT

## 2021-09-03 PROCEDURE — 87491 CHLMYD TRACH DNA AMP PROBE: CPT

## 2021-09-03 PROCEDURE — 36415 COLL VENOUS BLD VENIPUNCTURE: CPT

## 2021-09-03 PROCEDURE — 82550 ASSAY OF CK (CPK): CPT

## 2021-09-03 PROCEDURE — 80179 DRUG ASSAY SALICYLATE: CPT

## 2021-09-03 PROCEDURE — 2580000003 HC RX 258: Performed by: INTERNAL MEDICINE

## 2021-09-03 PROCEDURE — 84443 ASSAY THYROID STIM HORMONE: CPT

## 2021-09-03 PROCEDURE — 1210000000 HC MED SURG R&B

## 2021-09-03 PROCEDURE — 87635 SARS-COV-2 COVID-19 AMP PRB: CPT

## 2021-09-03 PROCEDURE — 82077 ASSAY SPEC XCP UR&BREATH IA: CPT

## 2021-09-03 PROCEDURE — 82553 CREATINE MB FRACTION: CPT

## 2021-09-03 PROCEDURE — 85025 COMPLETE CBC W/AUTO DIFF WBC: CPT

## 2021-09-03 PROCEDURE — 99285 EMERGENCY DEPT VISIT HI MDM: CPT

## 2021-09-03 PROCEDURE — 87591 N.GONORRHOEAE DNA AMP PROB: CPT

## 2021-09-03 PROCEDURE — 6370000000 HC RX 637 (ALT 250 FOR IP): Performed by: INTERNAL MEDICINE

## 2021-09-03 PROCEDURE — 80053 COMPREHEN METABOLIC PANEL: CPT

## 2021-09-03 RX ORDER — SODIUM CHLORIDE 0.9 % (FLUSH) 0.9 %
5-40 SYRINGE (ML) INJECTION EVERY 12 HOURS SCHEDULED
Status: DISCONTINUED | OUTPATIENT
Start: 2021-09-03 | End: 2021-09-07 | Stop reason: HOSPADM

## 2021-09-03 RX ORDER — SODIUM CHLORIDE 0.9 % (FLUSH) 0.9 %
5-40 SYRINGE (ML) INJECTION PRN
Status: DISCONTINUED | OUTPATIENT
Start: 2021-09-03 | End: 2021-09-07 | Stop reason: HOSPADM

## 2021-09-03 RX ORDER — POLYETHYLENE GLYCOL 3350 17 G/17G
17 POWDER, FOR SOLUTION ORAL DAILY PRN
Status: DISCONTINUED | OUTPATIENT
Start: 2021-09-03 | End: 2021-09-07 | Stop reason: HOSPADM

## 2021-09-03 RX ORDER — MAGNESIUM HYDROXIDE 1200 MG/15ML
LIQUID ORAL
Status: DISPENSED
Start: 2021-09-03 | End: 2021-09-04

## 2021-09-03 RX ORDER — SODIUM CHLORIDE 9 MG/ML
25 INJECTION, SOLUTION INTRAVENOUS PRN
Status: DISCONTINUED | OUTPATIENT
Start: 2021-09-03 | End: 2021-09-07 | Stop reason: HOSPADM

## 2021-09-03 RX ORDER — ACETAMINOPHEN 325 MG/1
650 TABLET ORAL EVERY 6 HOURS PRN
Status: DISCONTINUED | OUTPATIENT
Start: 2021-09-03 | End: 2021-09-07 | Stop reason: HOSPADM

## 2021-09-03 RX ORDER — POLYETHYLENE GLYCOL 3350 17 G/17G
17 POWDER, FOR SOLUTION ORAL 2 TIMES DAILY
COMMUNITY

## 2021-09-03 RX ORDER — ACETAMINOPHEN 650 MG/1
650 SUPPOSITORY RECTAL EVERY 6 HOURS PRN
Status: DISCONTINUED | OUTPATIENT
Start: 2021-09-03 | End: 2021-09-07 | Stop reason: HOSPADM

## 2021-09-03 RX ADMIN — ACETAMINOPHEN 650 MG: 325 TABLET ORAL at 22:51

## 2021-09-03 RX ADMIN — SODIUM CHLORIDE, PRESERVATIVE FREE 10 ML: 5 INJECTION INTRAVENOUS at 22:52

## 2021-09-03 ASSESSMENT — ENCOUNTER SYMPTOMS
NAUSEA: 0
SHORTNESS OF BREATH: 0
DIARRHEA: 0
PHOTOPHOBIA: 0
VOMITING: 0
BACK PAIN: 0
ABDOMINAL PAIN: 0
COUGH: 0
RHINORRHEA: 0
EYE PAIN: 0
SORE THROAT: 0

## 2021-09-03 ASSESSMENT — PAIN SCALES - GENERAL: PAINLEVEL_OUTOF10: 4
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
## 2021-09-03 NOTE — ED NOTES
Bed: 10  Expected date: 9/3/21  Expected time: 1:43 PM  Means of arrival: [facility]  Comments:  [address], RN  09/03/21 1400
Patient up and out of room, requesting trach suctioning and trach mask. Okay to suction patient and apply trach mask per Sean Poe.       Jovan Bowman RN  09/03/21 [id number]
[address], RN  09/03/21 5459
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
## 2021-09-04 PROCEDURE — 94761 N-INVAS EAR/PLS OXIMETRY MLT: CPT

## 2021-09-04 PROCEDURE — 1210000000 HC MED SURG R&B

## 2021-09-04 PROCEDURE — 6370000000 HC RX 637 (ALT 250 FOR IP): Performed by: PSYCHIATRY & NEUROLOGY

## 2021-09-04 PROCEDURE — 2700000000 HC OXYGEN THERAPY PER DAY

## 2021-09-04 PROCEDURE — 93005 ELECTROCARDIOGRAM TRACING: CPT

## 2021-09-04 PROCEDURE — 6370000000 HC RX 637 (ALT 250 FOR IP): Performed by: INTERNAL MEDICINE

## 2021-09-04 PROCEDURE — 94640 AIRWAY INHALATION TREATMENT: CPT

## 2021-09-04 PROCEDURE — 6360000002 HC RX W HCPCS: Performed by: INTERNAL MEDICINE

## 2021-09-04 RX ORDER — ALBUTEROL SULFATE 2.5 MG/3ML
2.5 SOLUTION RESPIRATORY (INHALATION) 4 TIMES DAILY
Status: DISCONTINUED | OUTPATIENT
Start: 2021-09-04 | End: 2021-09-07 | Stop reason: HOSPADM

## 2021-09-04 RX ORDER — ALBUTEROL SULFATE 2.5 MG/3ML
2.5 SOLUTION RESPIRATORY (INHALATION) 4 TIMES DAILY
COMMUNITY

## 2021-09-04 RX ORDER — CETIRIZINE HYDROCHLORIDE 10 MG/1
10 TABLET ORAL DAILY
Status: DISCONTINUED | OUTPATIENT
Start: 2021-09-04 | End: 2021-09-07 | Stop reason: HOSPADM

## 2021-09-04 RX ORDER — METOCLOPRAMIDE 10 MG/1
10 TABLET ORAL 3 TIMES DAILY
COMMUNITY

## 2021-09-04 RX ORDER — CHOLECALCIFEROL (VITAMIN D3) 125 MCG
2 CAPSULE ORAL DAILY
COMMUNITY

## 2021-09-04 RX ORDER — MESALAMINE 4 G/60ML
4 ENEMA RECTAL
Status: DISCONTINUED | OUTPATIENT
Start: 2021-09-06 | End: 2021-09-07 | Stop reason: HOSPADM

## 2021-09-04 RX ORDER — MIRTAZAPINE 15 MG/1
15 TABLET, FILM COATED ORAL NIGHTLY
Status: DISCONTINUED | OUTPATIENT
Start: 2021-09-04 | End: 2021-09-07 | Stop reason: HOSPADM

## 2021-09-04 RX ORDER — GLYCOPYRROLATE 1 MG/1
1 TABLET ORAL 3 TIMES DAILY
Status: DISCONTINUED | OUTPATIENT
Start: 2021-09-04 | End: 2021-09-07 | Stop reason: HOSPADM

## 2021-09-04 RX ORDER — DIAPER,BRIEF,INFANT-TODD,DISP
EACH MISCELLANEOUS 2 TIMES DAILY
Status: DISCONTINUED | OUTPATIENT
Start: 2021-09-04 | End: 2021-09-07 | Stop reason: HOSPADM

## 2021-09-04 RX ORDER — LIDOCAINE 5 G/100G
CREAM RECTAL; TOPICAL
Status: ON HOLD | COMMUNITY
End: 2021-09-07 | Stop reason: HOSPADM

## 2021-09-04 RX ORDER — TRAZODONE HYDROCHLORIDE 100 MG/1
100 TABLET ORAL NIGHTLY
Status: DISCONTINUED | OUTPATIENT
Start: 2021-09-04 | End: 2021-09-07 | Stop reason: HOSPADM

## 2021-09-04 RX ORDER — FLUOXETINE HYDROCHLORIDE 20 MG/1
40 CAPSULE ORAL DAILY
Status: DISCONTINUED | OUTPATIENT
Start: 2021-09-05 | End: 2021-09-04

## 2021-09-04 RX ORDER — FAMOTIDINE 20 MG/1
20 TABLET, FILM COATED ORAL DAILY
Status: DISCONTINUED | OUTPATIENT
Start: 2021-09-04 | End: 2021-09-07 | Stop reason: HOSPADM

## 2021-09-04 RX ORDER — POLYVINYL ALCOHOL 14 MG/ML
1 SOLUTION/ DROPS OPHTHALMIC 2 TIMES DAILY
Status: DISCONTINUED | OUTPATIENT
Start: 2021-09-04 | End: 2021-09-07 | Stop reason: HOSPADM

## 2021-09-04 RX ORDER — BUDESONIDE 0.5 MG/2ML
1 INHALANT ORAL 2 TIMES DAILY
COMMUNITY

## 2021-09-04 RX ORDER — MESALAMINE 4 G/60ML
4 ENEMA RECTAL
Status: DISCONTINUED | OUTPATIENT
Start: 2021-09-04 | End: 2021-09-04

## 2021-09-04 RX ORDER — DIAPER,BRIEF,INFANT-TODD,DISP
EACH MISCELLANEOUS 2 TIMES DAILY
COMMUNITY

## 2021-09-04 RX ORDER — BACLOFEN 20 MG/1
20 TABLET ORAL 3 TIMES DAILY PRN
Status: DISCONTINUED | OUTPATIENT
Start: 2021-09-04 | End: 2021-09-07 | Stop reason: HOSPADM

## 2021-09-04 RX ORDER — RISPERIDONE 2 MG/1
2 TABLET, FILM COATED ORAL NIGHTLY
Status: DISCONTINUED | OUTPATIENT
Start: 2021-09-04 | End: 2021-09-07 | Stop reason: HOSPADM

## 2021-09-04 RX ORDER — FLUOXETINE 10 MG/1
30 CAPSULE ORAL DAILY
Status: DISCONTINUED | OUTPATIENT
Start: 2021-09-04 | End: 2021-09-04

## 2021-09-04 RX ORDER — NYSTATIN 100000 [USP'U]/G
POWDER TOPICAL PRN
COMMUNITY

## 2021-09-04 RX ORDER — MONTELUKAST SODIUM 10 MG/1
10 TABLET ORAL DAILY
Status: DISCONTINUED | OUTPATIENT
Start: 2021-09-04 | End: 2021-09-07 | Stop reason: HOSPADM

## 2021-09-04 RX ORDER — PRAZOSIN HYDROCHLORIDE 1 MG/1
2 CAPSULE ORAL NIGHTLY
Status: DISCONTINUED | OUTPATIENT
Start: 2021-09-04 | End: 2021-09-07 | Stop reason: HOSPADM

## 2021-09-04 RX ORDER — CETIRIZINE HYDROCHLORIDE 10 MG/1
10 TABLET ORAL DAILY
COMMUNITY

## 2021-09-04 RX ORDER — FLUOXETINE HYDROCHLORIDE 20 MG/5ML
40 LIQUID ORAL DAILY
Status: DISCONTINUED | OUTPATIENT
Start: 2021-09-04 | End: 2021-09-07 | Stop reason: HOSPADM

## 2021-09-04 RX ORDER — BUDESONIDE 0.5 MG/2ML
500 INHALANT ORAL 2 TIMES DAILY
Status: DISCONTINUED | OUTPATIENT
Start: 2021-09-04 | End: 2021-09-07 | Stop reason: HOSPADM

## 2021-09-04 RX ORDER — FLUTICASONE PROPIONATE 50 MCG
1 SPRAY, SUSPENSION (ML) NASAL DAILY
Status: DISCONTINUED | OUTPATIENT
Start: 2021-09-04 | End: 2021-09-07 | Stop reason: HOSPADM

## 2021-09-04 RX ORDER — LISINOPRIL 2.5 MG/1
2.5 TABLET ORAL DAILY
Status: DISCONTINUED | OUTPATIENT
Start: 2021-09-04 | End: 2021-09-07 | Stop reason: HOSPADM

## 2021-09-04 RX ADMIN — HYDROCORTISONE: 1 CREAM TOPICAL at 22:43

## 2021-09-04 RX ADMIN — FLUOXETINE 30 MG: 10 CAPSULE ORAL at 15:36

## 2021-09-04 RX ADMIN — POLYVINYL ALCOHOL 1 DROP: 14 SOLUTION/ DROPS OPHTHALMIC at 22:42

## 2021-09-04 RX ADMIN — TRAZODONE HYDROCHLORIDE 100 MG: 100 TABLET ORAL at 22:42

## 2021-09-04 RX ADMIN — GLYCOPYRROLATE 1 MG: 1 TABLET ORAL at 22:42

## 2021-09-04 RX ADMIN — MONTELUKAST 10 MG: 10 TABLET, FILM COATED ORAL at 18:09

## 2021-09-04 RX ADMIN — GLYCOPYRROLATE 1 MG: 1 TABLET ORAL at 18:09

## 2021-09-04 RX ADMIN — MIRTAZAPINE 15 MG: 15 TABLET, FILM COATED ORAL at 22:42

## 2021-09-04 RX ADMIN — BUDESONIDE 500 MCG: 0.5 SUSPENSION RESPIRATORY (INHALATION) at 20:16

## 2021-09-04 RX ADMIN — FAMOTIDINE 20 MG: 20 TABLET ORAL at 15:36

## 2021-09-04 RX ADMIN — FLUOXETINE HYDROCHLORIDE 40 MG: 20 SOLUTION ORAL at 17:55

## 2021-09-04 RX ADMIN — RISPERIDONE 2 MG: 2 TABLET ORAL at 22:41

## 2021-09-04 RX ADMIN — HYDROCORTISONE: 1 CREAM TOPICAL at 18:09

## 2021-09-04 RX ADMIN — ALBUTEROL SULFATE 2.5 MG: 2.5 SOLUTION RESPIRATORY (INHALATION) at 20:16

## 2021-09-04 RX ADMIN — LISINOPRIL 2.5 MG: 2.5 TABLET ORAL at 15:36

## 2021-09-04 RX ADMIN — CETIRIZINE HYDROCHLORIDE 10 MG: 10 TABLET, FILM COATED ORAL at 15:36

## 2021-09-04 RX ADMIN — FLUTICASONE PROPIONATE 1 SPRAY: 50 SPRAY, METERED NASAL at 18:09

## 2021-09-04 RX ADMIN — POLYVINYL ALCOHOL 1 DROP: 14 SOLUTION/ DROPS OPHTHALMIC at 18:08

## 2021-09-04 RX ADMIN — PRAZOSIN HYDROCHLORIDE 2 MG: 1 CAPSULE ORAL at 22:42

## 2021-09-04 NOTE — PROGRESS NOTES
Patient awake in bed and watching TV. Reports no pain by shaking head as she is nonverbal.  Calm at this time. No noted signs of distress.

## 2021-09-04 NOTE — PLAN OF CARE
Nutrition Problem #1: Inadequate oral intake  Intervention: Food and/or Nutrient Delivery: Continue NPO, Continue Current Tube Feeding (Continue TF bolus regimen as at group home. Standard with fiber formula (Jevity 1.5 for Isosource 1.5 per [facility] Substitution list). 250 ml QID (7a, 11a, 3p, 7p). Increase free water flush to 150 ml water QID)  Nutritional Goals: EN to meet range of estimated energy/protein needs.   Wt gain to IBW

## 2021-09-04 NOTE — PROGRESS NOTES
[facility] AT [place] Respiratory Therapy Evaluation   Current Order: albuterol qid      Home Regimen: qid      Ordering Physician:   Re-evaluation Date:  9/7     Diagnosis:      Patient Status: Stable    The following MDI Criteria must be met in order to convert aerosol to MDI with spacer. If unable to meet, MDI will be converted to aerosol:  []  Patient able to demonstrate the ability to use MDI effectively  []  Patient alert and cooperative  []  Patient able to take deep breath with 5-10 second hold  []  Medication(s) available in this delivery method   []  Peak flow greater than or equal to 200 ml/min            Current Order Substituted To  (same drug, same frequency)   Aerosol to MDI [] Albuterol Sulfate 0.083% unit dose by aerosol Albuterol Sulfate MDI 2 puffs by inhalation with spacer    [] Levalbuterol 1.25 mg unit dose by aerosol Levalbuterol MDI 2 puffs by inhalation with spacer    [] Levalbuterol 0.63 mg unit dose by aerosol Levalbuterol MDI 2 puffs by inhalation with spacer    [] Ipratropium Bromide 0.02% unit dose by aerosol Ipratropium Bromide MDI 2 puffs by inhalation with spacer    [] Duoneb (Ipratropium + Albuterol) unit dose by aerosol Ipratropium MDI + Albuterol MDI 2 puffs by inhalation w/spacer   MDI to Aerosol [] Albuterol Sulfate MDI Albuterol Sulfate 0.083% unit dose by aerosol    [] Levalbuterol MDI 2 puffs by inhalation Levalbuterol 1.25 mg unit dose by aerosol    [] Ipratropium Bromide MDI by inhalation Ipratropium Bromide 0.02% unit dose by aerosol    [] Combivent (Ipratropium + Albuterol) MDI by inhalation Duoneb (Ipratropium + Albuterol) unit dose by aerosol       Treatment Assessment [Frequency/Schedule]:  Change frequency to: __no change________________________________________________per Protocol, P&T, MEC      Points 0 1 2 3 4   Pulmonary Status  Non-Smoker  []   Smoking history   < 20 pack years  []   Smoking history  ?  20 pack years  []   Pulmonary Disorder  (acute or chronic)  [x] Severe or Chronic w/ Exacerbation  []     Surgical Status No [x]   Surgeries     General []   Surgery Lower []   Abdominal Thoracic or []   Upper Abdominal Thoracic with  PulmonaryDisorder  []     Chest X-ray Clear/Not  Ordered     [x]  Chronic Changes  Results Pending  []  Infiltrates, atelectasis, pleural effusion, or edema  []  Infiltrates in more than one lobe []  Infiltrate + Atelectasis, &/or pleural effusion  []    Respiratory Pattern Regular,  RR = 12-20 [x]  Increased,  RR = 21-25 []  MORALES, irregular,  or RR = 26-30 []  Decreased FEV1  or RR = 31-35 []  Severe SOB, use  of accessory muscles, or RR ? 35  []    Mental Status Alert, oriented,  Cooperative [x]  Confused but Follows commands []  Lethargic or unable to follow commands []  Obtunded  []  Comatose  []    Breath Sounds Clear to  auscultation  []  Decreased unilaterally or  in bases only []  Decreased  bilaterally  [x]  Crackles or intermittent wheezes []  Wheezes []    Cough Strong, Spontan., & nonproductive [x]  Strong,  spontaneous, &  productive []  Weak,  Nonproductive []  Weak, productive or  with wheezes []  No spontaneous  cough or may require suctioning []    Level of Activity Ambulatory []  Ambulatory w/ Assist  [x]  Non-ambulatory []  Paraplegic []  Quadriplegic []    Total    Score:____6___     Triage Score:___4_____      Tri       Triage:     1. (>20) Freq: Q3    2. (16-20) Freq: Q4   3. (11-15) Freq: QID & Albuterol Q2 PRN    4. (6-10) Freq: TID & Albuterol Q2 PRN    5. (0-5) Freq Q4prn

## 2021-09-04 NOTE — PROGRESS NOTES
Patient up to bathroom. Expressed anxiety about trying to fill the urine specimen cup. After some time was able to successful fill the cup.

## 2021-09-04 NOTE — PROGRESS NOTES
Comprehensive Nutrition Assessment    Type and Reason for Visit:  Initial, Consult (PN ordering and manage)    Nutrition Recommendations/Plan:   Continue TF regimen as at group home:   Standard with fiber formula   (Jevity 1.5 for Isosource 1.5 per [facility] Substitution list). 250 ml QID (7a, 11a, 3p, 7p)    Increase free water flushes to 150 QID    Nutrition Assessment:  Pt presents from group home with suicidal ideation. PMH includes CP with PEG tube feedings since childhood. Consult recieved for PN order and manage. (Suspect consult needed for TF order and manage). Pt has been started on TF regimen as at group home. Pt is at risk for malnutrition due to noted wt loss (7.5% over 3 months). Unclear the etiology for wt loss, as current TF exceeds estimated energy requirements. Discused food allergies listed in chart with RN at group home. RN stated pt ate all foods listed as allergies prior to becoming NPO with no adverse affects, and this was confirmed with pt brother by group home RN. Recommend continue current TF bolus regimen, increase free water flushes to 150 ml QID as at group home and change consult to TF order and manage if MD wishes TF be managed by Dietitian. Malnutrition Assessment:  Malnutrition Status: At risk for malnutrition (Comment)    Context:  Social/Environmental Circumstances     Findings of the 6 clinical characteristics of malnutrition:  Energy Intake:  No significant decrease in energy intake  Weight Loss:  7 - Greater than 7.5% over 3 months     Body Fat Loss:  Unable to assess (CP)     Muscle Mass Loss:  Unable to assess (CP)    Fluid Accumulation:  No significant fluid accumulation     Strength:  Not Performed    Estimated Daily Nutrient Needs:  Energy (kcal):  3156-3316 (kg x 35-38); Weight Used for Energy Requirements:  Current (33.6 kg)     Protein (g):  50-60 gm (kg x 1.5-1.8);  Weight Used for Protein Requirements:  Current (33.6 kg)        Fluid (ml/day): 150 ml water flh QID)  Nutrition Education/Counseling:  No recommendation at this time   Coordination of Nutrition Care:  Continue to monitor while inpatient    Goals:  EN to meet range of estimated energy/protein needs.   Wt gain to IBW       Nutrition Monitoring and Evaluation:   Behavioral-Environmental Outcomes:      Food/Nutrient Intake Outcomes:  Enteral Nutrition Intake/Tolerance  Physical Signs/Symptoms Outcomes:  Biochemical Data, Weight     Electronically signed by Kristy Hernandez RD, LD on 9/4/21 at 9:47 AM EDT
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
## 2021-09-04 NOTE — FLOWSHEET NOTE
Pt awake in the bed inconstant of stool michael care done by PCA and new linnean applied . Pt is now suctioning herself no distess noted,HOB up pt watching TV. Electronically signed by Lena Oakes LPN on 7/2/9921 at 3:40 PM   Going through pt chart noticed she has a allergy to Milk and Jevity 1.5 is being substituted for Isosource. Ingredients states milk is first ingredient perfect severed Dr. Herbert Calderon asking if he wants  To continue the [address] [facility] waiting for the response. Electronically signed by Lena Oakes LPN on 0/1/6376 at 5:03 PM     Medications given via peg tube , cream applied, nose spray given and eye gtts applied. EKG:  Done and copy in chart. Tolerated all well. Electronically signed by Lena Oakes LPN on 6/6/9581 at 2:79 PM

## 2021-09-04 NOTE — PROGRESS NOTES
Patient up to toilet with assistance. Bottom sheet and brief changed while toileting. Patient indicated vaginal itching when asked \"are you having trouble peeing?' due to only a quarter sized thick orange/brown urination in hat in toilet. Patient self suctioning upon return to bed.

## 2021-09-05 PROCEDURE — 6370000000 HC RX 637 (ALT 250 FOR IP): Performed by: INTERNAL MEDICINE

## 2021-09-05 PROCEDURE — 1210000000 HC MED SURG R&B

## 2021-09-05 PROCEDURE — 94640 AIRWAY INHALATION TREATMENT: CPT

## 2021-09-05 PROCEDURE — 94761 N-INVAS EAR/PLS OXIMETRY MLT: CPT

## 2021-09-05 PROCEDURE — 94760 N-INVAS EAR/PLS OXIMETRY 1: CPT

## 2021-09-05 PROCEDURE — 6370000000 HC RX 637 (ALT 250 FOR IP): Performed by: PSYCHIATRY & NEUROLOGY

## 2021-09-05 PROCEDURE — 6360000002 HC RX W HCPCS: Performed by: INTERNAL MEDICINE

## 2021-09-05 PROCEDURE — 2700000000 HC OXYGEN THERAPY PER DAY

## 2021-09-05 RX ADMIN — FLUTICASONE PROPIONATE 1 SPRAY: 50 SPRAY, METERED NASAL at 10:52

## 2021-09-05 RX ADMIN — PRAZOSIN HYDROCHLORIDE 2 MG: 1 CAPSULE ORAL at 21:45

## 2021-09-05 RX ADMIN — CETIRIZINE HYDROCHLORIDE 10 MG: 10 TABLET, FILM COATED ORAL at 10:45

## 2021-09-05 RX ADMIN — BUDESONIDE 500 MCG: 0.5 SUSPENSION RESPIRATORY (INHALATION) at 07:21

## 2021-09-05 RX ADMIN — HYDROCORTISONE: 1 CREAM TOPICAL at 21:49

## 2021-09-05 RX ADMIN — ALBUTEROL SULFATE 2.5 MG: 2.5 SOLUTION RESPIRATORY (INHALATION) at 07:21

## 2021-09-05 RX ADMIN — ALBUTEROL SULFATE 2.5 MG: 2.5 SOLUTION RESPIRATORY (INHALATION) at 10:54

## 2021-09-05 RX ADMIN — LISINOPRIL 2.5 MG: 2.5 TABLET ORAL at 16:38

## 2021-09-05 RX ADMIN — TRAZODONE HYDROCHLORIDE 100 MG: 100 TABLET ORAL at 21:44

## 2021-09-05 RX ADMIN — GLYCOPYRROLATE 1 MG: 1 TABLET ORAL at 10:45

## 2021-09-05 RX ADMIN — FAMOTIDINE 20 MG: 20 TABLET ORAL at 10:45

## 2021-09-05 RX ADMIN — BUDESONIDE 500 MCG: 0.5 SUSPENSION RESPIRATORY (INHALATION) at 20:28

## 2021-09-05 RX ADMIN — ALBUTEROL SULFATE 2.5 MG: 2.5 SOLUTION RESPIRATORY (INHALATION) at 15:08

## 2021-09-05 RX ADMIN — ACETAMINOPHEN 650 MG: 325 TABLET ORAL at 10:45

## 2021-09-05 RX ADMIN — POLYVINYL ALCOHOL 1 DROP: 14 SOLUTION/ DROPS OPHTHALMIC at 10:52

## 2021-09-05 RX ADMIN — GLYCOPYRROLATE 1 MG: 1 TABLET ORAL at 21:43

## 2021-09-05 RX ADMIN — FLUOXETINE HYDROCHLORIDE 40 MG: 20 SOLUTION ORAL at 10:45

## 2021-09-05 RX ADMIN — MIRTAZAPINE 15 MG: 15 TABLET, FILM COATED ORAL at 21:44

## 2021-09-05 RX ADMIN — ACETAMINOPHEN 650 MG: 325 TABLET ORAL at 21:48

## 2021-09-05 RX ADMIN — POLYVINYL ALCOHOL 1 DROP: 14 SOLUTION/ DROPS OPHTHALMIC at 21:49

## 2021-09-05 RX ADMIN — ALBUTEROL SULFATE 2.5 MG: 2.5 SOLUTION RESPIRATORY (INHALATION) at 20:28

## 2021-09-05 RX ADMIN — RISPERIDONE 2 MG: 2 TABLET ORAL at 21:46

## 2021-09-05 RX ADMIN — HYDROCORTISONE: 1 CREAM TOPICAL at 10:53

## 2021-09-05 RX ADMIN — MONTELUKAST 10 MG: 10 TABLET, FILM COATED ORAL at 10:45

## 2021-09-05 RX ADMIN — GLYCOPYRROLATE 1 MG: 1 TABLET ORAL at 16:38

## 2021-09-05 ASSESSMENT — PAIN SCALES - GENERAL
PAINLEVEL_OUTOF10: 6
PAINLEVEL_OUTOF10: 6

## 2021-09-05 NOTE — PROGRESS NOTES
Hospitalist Progress Note      Date of Admission: 9/3/2021  Chief Complaint:    Chief Complaint   Patient presents with    Suicide Attempt     Subjective:  No new complaints. Medications:    Infusion Medications    sodium chloride       Scheduled Medications    albuterol  2.5 mg Nebulization 4x Daily    budesonide  500 mcg Nebulization BID    polyvinyl alcohol  1 drop Both Eyes BID    cetirizine  10 mg Per G Tube Daily    famotidine  20 mg Oral Daily    fluticasone  1 spray Nasal Daily    glycopyrrolate  1 mg Per G Tube TID    hydrocortisone   Topical BID    lisinopril  2.5 mg Oral Daily    montelukast  10 mg Per G Tube Daily    mirtazapine  15 mg Oral Nightly    prazosin  2 mg Oral Nightly    risperiDONE  2 mg Oral Nightly    traZODone  100 mg Oral Nightly    [START ON 9/6/2021] mesalamine  4 g Rectal Q72H    FLUoxetine  40 mg Oral Daily    sodium chloride flush  5-40 mL IntraVENous 2 times per day    enoxaparin  40 mg SubCUTAneous Daily     PRN Meds: baclofen, docusate, sodium chloride flush, sodium chloride, polyethylene glycol, acetaminophen **OR** acetaminophen  No intake or output data in the 24 hours ending 09/04/21 2113  Exam:  /84   Pulse 86   Temp 98 °F (36.7 °C) (Axillary)   Resp 18   Ht 4' 9\" (1.448 m)   Wt 74 lb (33.6 kg)   LMP  (LMP Unknown)   SpO2 99%   BMI 16.01 kg/m²   Head: Normocephalic, atraumatic  Sclera clear  Neck JVD flat  Lungs: normal effort of breathing    Labs:   Recent Labs     09/03/21  1528   WBC 7.7   HGB 13.7   HCT 40.5        Recent Labs     09/03/21  1528      K 4.4      CO2 23   BUN 12   CREATININE 0.55   CALCIUM 9.8   AST 28   ALT 14   BILITOT 0.3   ALKPHOS 88     No results for input(s): INR in the last 72 hours. Recent Labs     09/03/21  1528   CKTOTAL 258*     Radiology:  No orders to display     Assessment/Plan:    Suicidal idealtion/attempt. Awaiting psych input. No  Acute medical issues.       25 minutes total care time, >1/2 in unit/floor time and care coordination      Laurie Nichols MD

## 2021-09-05 NOTE — PROGRESS NOTES
Hospitalist Progress Note      Date of Admission: 9/3/2021  Chief Complaint:    Chief Complaint   Patient presents with    Suicide Attempt     Subjective:  No new complaints. Medications:    Infusion Medications    sodium chloride       Scheduled Medications    albuterol  2.5 mg Nebulization 4x Daily    budesonide  500 mcg Nebulization BID    polyvinyl alcohol  1 drop Both Eyes BID    cetirizine  10 mg Per G Tube Daily    famotidine  20 mg Oral Daily    fluticasone  1 spray Nasal Daily    glycopyrrolate  1 mg Per G Tube TID    hydrocortisone   Topical BID    lisinopril  2.5 mg Oral Daily    montelukast  10 mg Per G Tube Daily    mirtazapine  15 mg Oral Nightly    prazosin  2 mg Oral Nightly    risperiDONE  2 mg Oral Nightly    traZODone  100 mg Oral Nightly    [START ON 9/6/2021] mesalamine  4 g Rectal Q72H    FLUoxetine  40 mg Oral Daily    sodium chloride flush  5-40 mL IntraVENous 2 times per day    enoxaparin  40 mg SubCUTAneous Daily     PRN Meds: baclofen, docusate, sodium chloride flush, sodium chloride, polyethylene glycol, acetaminophen **OR** acetaminophen  No intake or output data in the 24 hours ending 09/05/21 1421  Exam:  /84   Pulse 86   Temp 98 °F (36.7 °C) (Axillary)   Resp 18   Ht 4' 9\" (1.448 m)   Wt 74 lb (33.6 kg)   LMP  (LMP Unknown)   SpO2 94%   BMI 16.01 kg/m²   Head: Normocephalic, atraumatic  Sclera clear  Neck JVD flat  Lungs: normal effort of breathing    Labs:   Recent Labs     09/03/21  1528   WBC 7.7   HGB 13.7   HCT 40.5        Recent Labs     09/03/21  1528      K 4.4      CO2 23   BUN 12   CREATININE 0.55   CALCIUM 9.8   AST 28   ALT 14   BILITOT 0.3   ALKPHOS 88     No results for input(s): INR in the last 72 hours. Recent Labs     09/03/21  1528   CKTOTAL 258*     Radiology:  No orders to display     Assessment/Plan:    Suicidal idealtion/attempt. Awaiting psych input. No  Acute medical issues.       25 minutes total care

## 2021-09-05 NOTE — CARE COORDINATION
BEATA Pierre 5. PT IS RESIDENT AT FACILITY. HAS PEG AND TRACH. AMBULATES IND. AT FACILITY AND WHEELCHAIR FOR GOING OUT. PT NONVERBAL AT BASELINE. PLAN FOR RETURN TO RESCARE WHEN CLEARED BY PSYCH. DR. Melvi Ingram ADJUSTING MEDICATIONS. REMAINS 1:1 FOR SAFETY. PER HIS NOTES, UNABLE TO TRANSFER TO 3W DUE TO MEDICAL COMPLEXITY.

## 2021-09-05 NOTE — PLAN OF CARE
Problem: Suicide risk  Goal: Provide patient with safe environment  Description: Provide patient with safe environment  Outcome: Ongoing     Problem: Skin Integrity:  Goal: Will show no infection signs and symptoms  Description: Will show no infection signs and symptoms  Outcome: Ongoing  Goal: Absence of new skin breakdown  Description: Absence of new skin breakdown  Outcome: Ongoing     Problem: Nutrition  Goal: Optimal nutrition therapy  Outcome: Ongoing     Problem: Falls - Risk of:  Goal: Will remain free from falls  Description: Will remain free from falls  Outcome: Ongoing  Goal: Absence of physical injury  Description: Absence of physical injury  Outcome: Ongoing     Problem: Sensory:  Goal: Ability to identify factors that increase the pain will improve  Description: Ability to identify factors that increase the pain will improve  Outcome: Ongoing  Goal: Ability to demonstrate ways to enhance comfort will improve  Description: Ability to demonstrate ways to enhance comfort will improve  Outcome: Ongoing  Goal: General experience of comfort will improve  Description: General experience of comfort will improve  Outcome: Ongoing

## 2021-09-05 NOTE — SUICIDE SAFETY PLAN
SAFETY PLAN    A suicide Safety Plan is a document that supports someone when they are having thoughts of suicide. Warning Signs that indicate a suicidal crisis may be developing: What (situations, thoughts, feelings, body sensations, behaviors, etc.) do you experience that lets you know you are beginning to think about suicide? 1. ***  2. ***  3. ***    Internal Coping Strategies:  What things can I do (relaxation techniques, hobbies, physical activities, etc.) to take my mind off my problems without contacting another person? 1. ***  2. ***  3. ***    People and social settings that provide distraction: Who can I call or where can I go to distract me? 1. Name: ***  Phone: ***  2. Name: ***  Phone: ***   3. Place: ***            4. Place: ***    People whom I can ask for help: Who can I call when I need help - for example, friends, family, clergy, someone else? 1. Name: ***                Phone: ***  2. Name: ***  Phone: ***  3. Name: ***  Phone: ***    Professionals or [facility] agencies I can contact during a crisis: Who can I call for help - for example, my doctor, my psychiatrist, my psychologist, a mental health provider, a suicide hotline? 1. Clinician Name: ***   Phone: ***      Clinician Pager or Emergency Contact #: ***    2. Clinician Name: ***   Phone: ***      Clinician Pager or Emergency Contact #: ***    3. Suicide Prevention Lifeline: 8-624-493-TALK (5384)    4. [address] [place] [address] Emergency Services -  for example, [facility] suicide hotline, [facility] Hotline: ***      Emergency Services Address: ***      Emergency Services Phone: ***    Making the environment safe: How can I make my environment (house/apartment/living space) safer? For example, can I remove guns, medications, and other items?   1. ***  2. ***

## 2021-09-06 LAB
ANION GAP SERPL CALCULATED.3IONS-SCNC: 11 MEQ/L (ref 9–15)
BASOPHILS ABSOLUTE: 0 K/UL (ref 0–0.2)
BASOPHILS RELATIVE PERCENT: 0.5 %
BUN BLDV-MCNC: 15 MG/DL (ref 6–20)
CALCIUM SERPL-MCNC: 9.5 MG/DL (ref 8.5–9.9)
CHLORIDE BLD-SCNC: 99 MEQ/L (ref 95–107)
CO2: 27 MEQ/L (ref 20–31)
CREAT SERPL-MCNC: 0.55 MG/DL (ref 0.5–0.9)
EOSINOPHILS ABSOLUTE: 0.3 K/UL (ref 0–0.7)
EOSINOPHILS RELATIVE PERCENT: 4.9 %
GFR AFRICAN AMERICAN: >60
GFR NON-AFRICAN AMERICAN: >60
GLUCOSE BLD-MCNC: 105 MG/DL (ref 70–99)
HCT VFR BLD CALC: 43.4 % (ref 37–47)
HEMOGLOBIN: 14.7 G/DL (ref 12–16)
LYMPHOCYTES ABSOLUTE: 1 K/UL (ref 1–4.8)
LYMPHOCYTES RELATIVE PERCENT: 18.4 %
MCH RBC QN AUTO: 31.5 PG (ref 27–31.3)
MCHC RBC AUTO-ENTMCNC: 33.9 % (ref 33–37)
MCV RBC AUTO: 93 FL (ref 82–100)
MONOCYTES ABSOLUTE: 0.4 K/UL (ref 0.2–0.8)
MONOCYTES RELATIVE PERCENT: 6.6 %
NEUTROPHILS ABSOLUTE: 3.8 K/UL (ref 1.4–6.5)
NEUTROPHILS RELATIVE PERCENT: 69.6 %
PDW BLD-RTO: 13.4 % (ref 11.5–14.5)
PLATELET # BLD: 292 K/UL (ref 130–400)
POTASSIUM SERPL-SCNC: 4 MEQ/L (ref 3.4–4.9)
RBC # BLD: 4.67 M/UL (ref 4.2–5.4)
SODIUM BLD-SCNC: 137 MEQ/L (ref 135–144)
WBC # BLD: 5.5 K/UL (ref 4.8–10.8)

## 2021-09-06 PROCEDURE — 94761 N-INVAS EAR/PLS OXIMETRY MLT: CPT

## 2021-09-06 PROCEDURE — 2700000000 HC OXYGEN THERAPY PER DAY

## 2021-09-06 PROCEDURE — 6360000002 HC RX W HCPCS: Performed by: INTERNAL MEDICINE

## 2021-09-06 PROCEDURE — 1210000000 HC MED SURG R&B

## 2021-09-06 PROCEDURE — 6370000000 HC RX 637 (ALT 250 FOR IP): Performed by: PSYCHIATRY & NEUROLOGY

## 2021-09-06 PROCEDURE — 6370000000 HC RX 637 (ALT 250 FOR IP): Performed by: INTERNAL MEDICINE

## 2021-09-06 PROCEDURE — 94640 AIRWAY INHALATION TREATMENT: CPT

## 2021-09-06 PROCEDURE — 85025 COMPLETE CBC W/AUTO DIFF WBC: CPT

## 2021-09-06 PROCEDURE — 36415 COLL VENOUS BLD VENIPUNCTURE: CPT

## 2021-09-06 PROCEDURE — 80048 BASIC METABOLIC PNL TOTAL CA: CPT

## 2021-09-06 RX ADMIN — POLYVINYL ALCOHOL 1 DROP: 14 SOLUTION/ DROPS OPHTHALMIC at 23:21

## 2021-09-06 RX ADMIN — MIRTAZAPINE 15 MG: 15 TABLET, FILM COATED ORAL at 23:21

## 2021-09-06 RX ADMIN — GLYCOPYRROLATE 1 MG: 1 TABLET ORAL at 23:15

## 2021-09-06 RX ADMIN — CETIRIZINE HYDROCHLORIDE 10 MG: 10 TABLET, FILM COATED ORAL at 10:43

## 2021-09-06 RX ADMIN — GLYCOPYRROLATE 1 MG: 1 TABLET ORAL at 10:42

## 2021-09-06 RX ADMIN — RISPERIDONE 2 MG: 2 TABLET ORAL at 23:15

## 2021-09-06 RX ADMIN — ACETAMINOPHEN 650 MG: 325 TABLET ORAL at 10:41

## 2021-09-06 RX ADMIN — FLUOXETINE HYDROCHLORIDE 40 MG: 20 SOLUTION ORAL at 10:45

## 2021-09-06 RX ADMIN — ALBUTEROL SULFATE 2.5 MG: 2.5 SOLUTION RESPIRATORY (INHALATION) at 12:14

## 2021-09-06 RX ADMIN — LISINOPRIL 2.5 MG: 2.5 TABLET ORAL at 10:42

## 2021-09-06 RX ADMIN — BUDESONIDE 500 MCG: 0.5 SUSPENSION RESPIRATORY (INHALATION) at 09:04

## 2021-09-06 RX ADMIN — GLYCOPYRROLATE 1 MG: 1 TABLET ORAL at 16:39

## 2021-09-06 RX ADMIN — POLYVINYL ALCOHOL 1 DROP: 14 SOLUTION/ DROPS OPHTHALMIC at 10:46

## 2021-09-06 RX ADMIN — HYDROCORTISONE: 1 CREAM TOPICAL at 23:21

## 2021-09-06 RX ADMIN — FAMOTIDINE 20 MG: 20 TABLET ORAL at 10:43

## 2021-09-06 RX ADMIN — MONTELUKAST 10 MG: 10 TABLET, FILM COATED ORAL at 10:43

## 2021-09-06 RX ADMIN — BUDESONIDE 500 MCG: 0.5 SUSPENSION RESPIRATORY (INHALATION) at 20:13

## 2021-09-06 RX ADMIN — ALBUTEROL SULFATE 2.5 MG: 2.5 SOLUTION RESPIRATORY (INHALATION) at 16:15

## 2021-09-06 RX ADMIN — ALBUTEROL SULFATE 2.5 MG: 2.5 SOLUTION RESPIRATORY (INHALATION) at 20:11

## 2021-09-06 RX ADMIN — ALBUTEROL SULFATE 2.5 MG: 2.5 SOLUTION RESPIRATORY (INHALATION) at 09:04

## 2021-09-06 RX ADMIN — FLUTICASONE PROPIONATE 1 SPRAY: 50 SPRAY, METERED NASAL at 10:45

## 2021-09-06 RX ADMIN — PRAZOSIN HYDROCHLORIDE 2 MG: 1 CAPSULE ORAL at 23:16

## 2021-09-06 RX ADMIN — TRAZODONE HYDROCHLORIDE 100 MG: 100 TABLET ORAL at 23:16

## 2021-09-06 ASSESSMENT — PAIN DESCRIPTION - PAIN TYPE: TYPE: ACUTE PAIN

## 2021-09-06 ASSESSMENT — PAIN SCALES - GENERAL: PAINLEVEL_OUTOF10: 5

## 2021-09-06 ASSESSMENT — PAIN DESCRIPTION - LOCATION: LOCATION: HEAD

## 2021-09-06 NOTE — PROGRESS NOTES
Patient reports relief from zinc ointment. Patient does not appear to still have irritation in the vaginal and anal area as evidence by no longer itching.

## 2021-09-06 NOTE — PROGRESS NOTES
Hospitalist Progress Note      Date of Admission: 9/3/2021  Chief Complaint:    Chief Complaint   Patient presents with    Suicide Attempt     Subjective:  Nonverbal at baseline, in setting of CP and chronic tracheostomy. Appears to fully understand spoken language, but communicates entirely through pantomime (doesn't write well, per nursing). Gestures to report/request following:  Ongoing anogenital itching, no relief from barrier cream, would like Wound Care help. Loose stools, being given dairy-containing formula per PEG, but lactose intolerant, wants changed. Has problems with swallowing, and with colon. Has had EGD dilations previously. Wants GI to see her while here. Medications:    Infusion Medications    sodium chloride       Scheduled Medications    albuterol  2.5 mg Nebulization 4x Daily    budesonide  500 mcg Nebulization BID    polyvinyl alcohol  1 drop Both Eyes BID    cetirizine  10 mg Per G Tube Daily    famotidine  20 mg Oral Daily    fluticasone  1 spray Nasal Daily    glycopyrrolate  1 mg Per G Tube TID    hydrocortisone   Topical BID    lisinopril  2.5 mg Oral Daily    montelukast  10 mg Per G Tube Daily    mirtazapine  15 mg Oral Nightly    prazosin  2 mg Oral Nightly    risperiDONE  2 mg Oral Nightly    traZODone  100 mg Oral Nightly    mesalamine  4 g Rectal Q72H    FLUoxetine  40 mg Oral Daily    sodium chloride flush  5-40 mL IntraVENous 2 times per day    enoxaparin  40 mg SubCUTAneous Daily     PRN Meds: baclofen, docusate, sodium chloride flush, sodium chloride, polyethylene glycol, acetaminophen **OR** acetaminophen  No intake or output data in the 24 hours ending 09/06/21 5708  Exam:  /71   Pulse 88   Temp 97.5 °F (36.4 °C) (Axillary)   Resp 16   Ht 4' 9\" (1.448 m)   Wt 74 lb (33.6 kg)   LMP  (LMP Unknown)   SpO2 98%   BMI 16.01 kg/m²   Head: Normocephalic, atraumatic  Eyes: Exotropic strabismus, left eye appearing dominant. PERRLA.   No conjunctivitis. Neck: No JVD. Tracheostomy in place, C/D/I. Cardiac: Regular rate and rhythm. No murmurs appreciated. Warm and well-perfused peripherally. Pulmonary: Normal rate and effort of respiration. Abdomen: Soft, nontender, nondistended. PEG site C/D/I. Neurologic: Apparent baseline neurologic CP deficits, but appears to mentate entirely normally, communicating through pantomime in charades-like fashion. Integumentary: Focal excoriation of glabella, with no S/Sx of focal infection. Labs:   Recent Labs     09/06/21  1141   WBC 5.5   HGB 14.7   HCT 43.4        Recent Labs     09/06/21  1141      K 4.0   CL 99   CO2 27   BUN 15   CREATININE 0.55   CALCIUM 9.5     No results for input(s): INR in the last 72 hours. No results for input(s): Winford Morton in the last 72 hours. Radiology:  No orders to display     Assessment/Plan:    Suicidal idealtion/attempt in setting of cerebral palsy with significant physical impairments  -Awaiting psych input. Anogenital pruritis  -Chronic, flared at present, no significant relief from standard barrier cream.  Wound care consulted. Hx Chronic Proctitis ( previously on mesalamine in 2020). H/o of rectovaginal fistula ( unclear etiology)   Intermittent rectal bleeding (minimal and mainly during menstruation, per available records)  Chronic nausea/emesis  Hx Distal esophogeal stricture s/p dilation (8-9mm)   - seen by [facility] and [facility], most recently 9/1/2021 by Susy Thomson (GI Fellow). Patient requesting [facility] be brought onto her case for recommendations, given her extensive GI Hx, including recent issues. Loose stools in setting of tube feeds per PEG containing lactose, in setting of lactose intolerance (on non-lactose feeds at facility).   -Dietary/Nutrition consulted, instructed to change to non-lactose containing PEG feeds  -Wound/ostomy consulted for trach/PEG cares while admitted. Disposition:  Discharge deferred, pending psychiatry and GI recommendations.     Ronda Saavedra, DO

## 2021-09-06 NOTE — PROGRESS NOTES
Patient notified this writer that she needed to use the bathroom. Assisted patient ambulate to bathroom. Small, unformed, liquid brown bowel movement with a small amount of blood present in toilet and on toilet tissue paper. Patient denies any pain at this time. Patient is now resting in bed. Call light within reach. 1:1 maintained for patient safety Will continue to monitor.

## 2021-09-06 NOTE — PROGRESS NOTES
Patient is resting in bed. No complaints at this time. 1:1 patient observation maintained for patient safety. Will continue to monitor.

## 2021-09-06 NOTE — FLOWSHEET NOTE
Patient is known to have a milk allergy, Jevity 1.5 ordered. TF was held today, no diarrhea. Evening feeding given as ordered, diarrhea started again.

## 2021-09-06 NOTE — PROGRESS NOTES
Nutrition Note    Consult recieved for TF order and manage. Per RN, pt has been having loose stools since starting TF. Pt had been started on TF regimen as at group home, TF formula substituted with Jevity 1.5 for Isosource 1.5 (TF at group home). Both formulas are milk based TF formulas. Discused food allergies listed in chart with RN at group home Veronicamicah Kenney) on (9/4). Brittany Nelson stated pt ate all foods listed as allergies prior to becoming NPO a few weeks ago with no adverse affects, and this was confirmed with pt brother by Brittany Nelson. Brittany Nelson also stated that pt had been recieving Jevity 1.5 formula prior to arrival at group home a couple of months ago and was tolerating at at that time. Will trial change in TF formula to Peptide Based (Vital AF) and monitor tolerance. Will keep bolus volume the same for now. If pt tolerates Peptide Based Formula, will need to increase bolus volume to meet range of estimated energy/protein needs. If diarrhea persists, recommend adding lomotil or other anti-diarrheal agent    · Current Tube Feeding (TF) Orders:   · Feeding Route: PEG  · Formula: Standard with Fiber (Jevity 1.5)  · Schedule:  Bolus 240 ml QID  · Water Flushes: 150 ml QID (600 ml)  · Current TF & Flush Orders Provides: 1500 kcal, 63 gm protein, ~ 1160 ml free water  · Goal TF & Flush Orders Provides: With change to Vital AF = 1152 kcal, 72 gm protein, ~1385 ml free water    Electronically signed by Rachel Pina, ARIANE, LD on 9/6/21 at 1:01 PM EDT

## 2021-09-06 NOTE — PROGRESS NOTES
Assisted patient ambulate to bathroom. Patient had a small, unformed, liquid brown bowel movement with a small amount of blood present in toilet and on toilet tissue paper. Patient denies any pain at this time. Patient is now in bed, resting. Call light within reach. 1:1 maintained for patient safety. Will continue to monitor.

## 2021-09-06 NOTE — PROGRESS NOTES
RN, Shasta Rodriguez notified of liquid brown bowel movement with blood in toilet and toilet tissue paper.

## 2021-09-06 NOTE — PROGRESS NOTES
BEHAVIORAL HEALTH FOLLOW-UP NOTE     9/6/2021     Patient was seen and examined in person, Chart reviewed   Patient's case discussed with staff/team    Chief Complaint: depressed mood, suicidal ideation, PTSD symptoms    Interim History:     Communication was limited due to patient being non-verbal. She is still able though to gesture when asked questions. She said she is \"better\". She was able to sleep, but still has nightmares. She is \"happier\". She does not want to die. She denied thoughts of wanting to harm anyone. She denied hallucinations. She denied feeling that others want to harm her. Appetite:   [x] Normal/Unchanged (feeding by PEG tube though) [] Increased  [] Decreased      Sleep:       [] Normal/Unchanged  [x] Fair       [] Poor              Energy:    [x] Normal/Unchanged  [] Increased  [] Decreased        SI [] Present  [x] Absent    HI  []Present  [x] Absent     Aggression:  [] yes  [x] no    Patient is [x] able  [] unable to CONTRACT FOR SAFETY     PAST MEDICAL/PSYCHIATRIC HISTORY:   History reviewed. No pertinent past medical history. FAMILY/SOCIAL HISTORY:  History reviewed. No pertinent family history.   Social History     Socioeconomic History    Marital status: Single     Spouse name: Not on file    Number of children: Not on file    Years of education: Not on file    Highest education level: Not on file   Occupational History    Not on file   Tobacco Use    Smoking status: Never Smoker    Smokeless tobacco: Never Used   Substance and Sexual Activity    Alcohol use: Not on file    Drug use: Not on file    Sexual activity: Not on file   Other Topics Concern    Not on file   Social History Narrative    Not on file     Social Determinants of Health     Financial Resource Strain:     Difficulty of Paying Living Expenses:    Food Insecurity:     Worried About Running Out of Food in the Last Year:     [address] in the Last Year:    Transportation Needs:     Lack of Transportation (Medical):  Lack of Transportation (Non-Medical):    Physical Activity:     Days of Exercise per Week:     Minutes of Exercise per Session:    Stress:     Feeling of Stress :    Social Connections:     Frequency of Communication with Friends and Family:     Frequency of Social Gatherings with Friends and Family:     Attends [employer] Services:     Active Member of Clubs or Organizations:     Attends Club or Organization Meetings:     Marital Status:    Intimate Partner Violence:     Fear of Current or Ex-Partner:     Emotionally Abused:     Physically Abused:     Sexually Abused:            ROS:  [x] All negative/unchanged except if checked.  Explain positive(checked items) below:  [] Constitutional  [] Eyes  [] Ear/Nose/Mouth/Throat  [] Respiratory  [] CV  [] GI  []   [] Musculoskeletal  [] Skin/Breast  [] Neurological  [] Endocrine  [] Heme/Lymph  [] Allergic/Immunologic    Explanation:     MEDICATIONS:    Current Facility-Administered Medications:     albuterol (PROVENTIL) nebulizer solution 2.5 mg, 2.5 mg, Nebulization, 4x Daily, Lennox D Sedar, DO, 2.5 mg at 09/06/21 1214    baclofen (LIORESAL) tablet 20 mg, 20 mg, Oral, TID PRN, Vivi Farm Sedar, DO    budesonide (PULMICORT) nebulizer suspension 500 mcg, 500 mcg, Nebulization, BID, Lennox D Sedar, DO, 500 mcg at 09/06/21 0904    polyvinyl alcohol (LIQUIFILM TEARS) 1.4 % ophthalmic solution 1 drop, 1 drop, Both Eyes, BID, Lennox D Sedar, DO, 1 drop at 09/06/21 1046    cetirizine (ZYRTEC) tablet 10 mg, 10 mg, Per G Tube, Daily, Lennox D Sedar, DO, 10 mg at 09/06/21 1043    docusate (COLACE) 50 MG/5ML liquid 100 mg, 100 mg, Per G Tube, BID PRN, Vivi Farm Sedar, DO    famotidine (PEPCID) tablet 20 mg, 20 mg, Oral, Daily, Lennox D Sedar, DO, 20 mg at 09/06/21 1043    fluticasone (FLONASE) 50 MCG/ACT nasal spray 1 spray, 1 spray, Nasal, Daily, Lennox D Sedar, DO, 1 spray at 09/06/21 1045    glycopyrrolate (ROBINUL) tablet 1 mg, 1 mg, Per G Tube, TID, Jocelin Sahu Sedar, DO, 1 mg at 09/06/21 1042    hydrocortisone 1 % cream, , Topical, BID, Jocelin Streeterar DO, Given at 09/05/21 2149    lisinopril (PRINIVIL;ZESTRIL) tablet 2.5 mg, 2.5 mg, Oral, Daily, Jocelin Sahu Sedar, DO, 2.5 mg at 09/06/21 1042    montelukast (SINGULAIR) tablet 10 mg, 10 mg, Per G Tube, Daily, Lennox Browning DO, 10 mg at 09/06/21 1043    mirtazapine (REMERON) tablet 15 mg, 15 mg, Oral, Nightly, Christophe Salcedo MD, 15 mg at 09/05/21 2144    prazosin (MINIPRESS) capsule 2 mg, 2 mg, Oral, Nightly, Jocelin Browning DO, 2 mg at 09/05/21 2145    risperiDONE (RISPERDAL) tablet 2 mg, 2 mg, Oral, Nightly, Christophe Salcedo MD, 2 mg at 09/05/21 2146    traZODone (DESYREL) tablet 100 mg, 100 mg, Oral, Nightly, Jocelin Browning DO, 100 mg at 09/05/21 2144    mesalamine (ROWASA) enema 4 g, 4 g, Rectal, Q72H, Joseph Faust MD    FLUoxetine (PROZAC) 20 MG/5ML solution 40 mg, 40 mg, Oral, Daily, Christophe Salcedo MD, 40 mg at 09/06/21 1045    sodium chloride flush 0.9 % injection 5-40 mL, 5-40 mL, IntraVENous, 2 times per day, Angel Nino MD, 10 mL at 09/03/21 2252    sodium chloride flush 0.9 % injection 5-40 mL, 5-40 mL, IntraVENous, PRN, Star Beverage, MD    0.9 % sodium chloride infusion, 25 mL, IntraVENous, PRN, Angel Nino MD    enoxaparin (LOVENOX) injection 40 mg, 40 mg, SubCUTAneous, Daily, Angel Nino MD    polyethylene glycol (GLYCOLAX) packet 17 g, 17 g, Oral, Daily PRN, Angel Nino MD    acetaminophen (TYLENOL) tablet 650 mg, 650 mg, Oral, Q6H PRN, 650 mg at 09/06/21 1041 **OR** acetaminophen (TYLENOL) suppository 650 mg, 650 mg, Rectal, Q6H PRN, Star Beverage, MD      Examination:  /71   Pulse 88   Temp 97.5 °F (36.4 °C) (Axillary)   Resp 16   Ht 4' 9\" (1.448 m)   Wt 74 lb (33.6 kg)   LMP  (LMP Unknown)   SpO2 99%   BMI 16.01 kg/m²     Medication side effects(SE): denies    Mental Status Examination:    Level of consciousness:  within normal limits   Appearance: fair grooming and fair hygiene  Behavior/Motor:  Cerebral Palsy; moves arms well  Attitude toward examiner:  cooperative and good eye contact  Speech:  non-verbal   Mood: depressed but improving  Affect:  constricted  Thought processes:  With concrete associations   Thought content:  Homocidal ideation denies  Suicidal Ideation:  denies suicidal ideation  Delusions:  no evidence of delusions  Perceptual Disturbance:  denies any perceptual disturbance  Cognition:  oriented to person, place, and time   Concentration distractible  Insight fair   Judgement fair     ASSESSMENT:   Patient symptoms are:  [x] Well controlled  [] Improving  [] Worsening  [] No change      Diagnosis:   Probable Major Depressive Disorder, recurrent, severe  Probable PTSD  R/o Intellectual Disability, unable to assess degree at this time (mild vs. Moderate)  Cerebral Palsy    LABS:    Recent Labs     09/03/21  1528 09/06/21  1141   WBC 7.7 5.5   HGB 13.7 14.7    292     Recent Labs     09/03/21  1528 09/06/21  1141    137   K 4.4 4.0    99   CO2 23 27   BUN 12 15   CREATININE 0.55 0.55   GLUCOSE 95 105*     Recent Labs     09/03/21  1528   BILITOT 0.3   ALKPHOS 88   AST 28   ALT 14     Lab Results   Component Value Date    ETOH <10 09/03/2021     Lab Results   Component Value Date    TSH 1.400 09/03/2021     No results found for: LITHIUM  No results found for: VALPROATE, CBMZ    RISK ASSESSMENT:     Suicide risk: moderate to high    Treatment Plan:  Reviewed current Medications with the patient. Will need to figure out how the Prazosin could best be administered by PEG tube  Risks, benefits, side effects, drug-to-drug interactions and alternatives to treatment were discussed. Collateral information: pending  CD evaluation  Encourage patient to attend group and other milieu activities.   Discharge planning discussed with the patient and treatment team.    PSYCHOTHERAPY/COUNSELING:  [x] Therapeutic interview  [x] Supportive  [] CBT  [] Ongoing  [] Other    [x] Patient continues to need, on a daily basis, active treatment furnished directly by or requiring the supervision of inpatient psychiatric personnel      Anticipated Length of stay:            Electronically signed by Lei Stevens MD on 9/6/2021 at 3:15 PM

## 2021-09-06 NOTE — PROGRESS NOTES
Zinc paste applied to the irritated vaginal and anal area per RN. Patient gestures that the areas are less irritated after application of paste. Will continue to monitor.

## 2021-09-06 NOTE — PROGRESS NOTES
Patient gestures to this writer that she needs to be cleaned up. Norah care provided along with an incontinence brief. Medium bowel movement noted(unformed, liquid brown stool, blood present in stool and in incontinence brief). Patient gestures that her vaginal area is irritated and is seen to be scratching the area. Patient educated to refrain from scratching the area to avoid cross contamination. Patient gives thumbs up to this writer to signify understanding. No other complaints at this time. Call light within reach. 1:1 maintained for patient safety. Will continue to monitor.

## 2021-09-07 VITALS
TEMPERATURE: 98.2 F | SYSTOLIC BLOOD PRESSURE: 119 MMHG | OXYGEN SATURATION: 100 % | HEIGHT: 57 IN | RESPIRATION RATE: 18 BRPM | DIASTOLIC BLOOD PRESSURE: 77 MMHG | BODY MASS INDEX: 15.97 KG/M2 | HEART RATE: 79 BPM | WEIGHT: 74 LBS

## 2021-09-07 PROBLEM — F33.9 MAJOR DEPRESSIVE DISORDER, RECURRENT, UNSPECIFIED (HCC): Status: ACTIVE | Noted: 2021-09-07

## 2021-09-07 LAB
ANION GAP SERPL CALCULATED.3IONS-SCNC: 12 MEQ/L (ref 9–15)
BASOPHILS ABSOLUTE: 0 K/UL (ref 0–0.2)
BASOPHILS RELATIVE PERCENT: 0.7 %
BUN BLDV-MCNC: 16 MG/DL (ref 6–20)
CALCIUM SERPL-MCNC: 9.1 MG/DL (ref 8.5–9.9)
CHLORIDE BLD-SCNC: 99 MEQ/L (ref 95–107)
CO2: 26 MEQ/L (ref 20–31)
CREAT SERPL-MCNC: 0.62 MG/DL (ref 0.5–0.9)
EOSINOPHILS ABSOLUTE: 0.4 K/UL (ref 0–0.7)
EOSINOPHILS RELATIVE PERCENT: 7.8 %
GFR AFRICAN AMERICAN: >60
GFR NON-AFRICAN AMERICAN: >60
GLUCOSE BLD-MCNC: 72 MG/DL (ref 70–99)
HCT VFR BLD CALC: 44.2 % (ref 37–47)
HEMOGLOBIN: 15.2 G/DL (ref 12–16)
LYMPHOCYTES ABSOLUTE: 1.2 K/UL (ref 1–4.8)
LYMPHOCYTES RELATIVE PERCENT: 22.2 %
MCH RBC QN AUTO: 32 PG (ref 27–31.3)
MCHC RBC AUTO-ENTMCNC: 34.4 % (ref 33–37)
MCV RBC AUTO: 93.1 FL (ref 82–100)
MONOCYTES ABSOLUTE: 0.5 K/UL (ref 0.2–0.8)
MONOCYTES RELATIVE PERCENT: 9.7 %
NEUTROPHILS ABSOLUTE: 3.3 K/UL (ref 1.4–6.5)
NEUTROPHILS RELATIVE PERCENT: 59.6 %
PDW BLD-RTO: 13.1 % (ref 11.5–14.5)
PLATELET # BLD: 273 K/UL (ref 130–400)
POTASSIUM SERPL-SCNC: 4.3 MEQ/L (ref 3.4–4.9)
RBC # BLD: 4.75 M/UL (ref 4.2–5.4)
SODIUM BLD-SCNC: 137 MEQ/L (ref 135–144)
WBC # BLD: 5.5 K/UL (ref 4.8–10.8)

## 2021-09-07 PROCEDURE — 80048 BASIC METABOLIC PNL TOTAL CA: CPT

## 2021-09-07 PROCEDURE — 36415 COLL VENOUS BLD VENIPUNCTURE: CPT

## 2021-09-07 PROCEDURE — 94640 AIRWAY INHALATION TREATMENT: CPT

## 2021-09-07 PROCEDURE — 99212 OFFICE O/P EST SF 10 MIN: CPT

## 2021-09-07 PROCEDURE — 6370000000 HC RX 637 (ALT 250 FOR IP): Performed by: INTERNAL MEDICINE

## 2021-09-07 PROCEDURE — 99253 IP/OBS CNSLTJ NEW/EST LOW 45: CPT | Performed by: INTERNAL MEDICINE

## 2021-09-07 PROCEDURE — 94664 DEMO&/EVAL PT USE INHALER: CPT

## 2021-09-07 PROCEDURE — 85025 COMPLETE CBC W/AUTO DIFF WBC: CPT

## 2021-09-07 PROCEDURE — 6370000000 HC RX 637 (ALT 250 FOR IP): Performed by: PSYCHIATRY & NEUROLOGY

## 2021-09-07 PROCEDURE — 94761 N-INVAS EAR/PLS OXIMETRY MLT: CPT

## 2021-09-07 PROCEDURE — 6360000002 HC RX W HCPCS: Performed by: INTERNAL MEDICINE

## 2021-09-07 PROCEDURE — 99232 SBSQ HOSP IP/OBS MODERATE 35: CPT | Performed by: PSYCHIATRY & NEUROLOGY

## 2021-09-07 PROCEDURE — 2700000000 HC OXYGEN THERAPY PER DAY

## 2021-09-07 RX ORDER — RISPERIDONE 2 MG/1
2 TABLET, FILM COATED ORAL NIGHTLY
Qty: 30 TABLET | Refills: 1 | Status: SHIPPED | OUTPATIENT
Start: 2021-09-07

## 2021-09-07 RX ORDER — FLUOXETINE HYDROCHLORIDE 20 MG/5ML
40 LIQUID ORAL DAILY
Qty: 150 ML | Refills: 3 | Status: SHIPPED | OUTPATIENT
Start: 2021-09-08

## 2021-09-07 RX ADMIN — ALBUTEROL SULFATE 2.5 MG: 2.5 SOLUTION RESPIRATORY (INHALATION) at 11:47

## 2021-09-07 RX ADMIN — MONTELUKAST 10 MG: 10 TABLET, FILM COATED ORAL at 11:26

## 2021-09-07 RX ADMIN — HYDROCORTISONE: 1 CREAM TOPICAL at 11:29

## 2021-09-07 RX ADMIN — GLYCOPYRROLATE 1 MG: 1 TABLET ORAL at 11:24

## 2021-09-07 RX ADMIN — FLUOXETINE HYDROCHLORIDE 40 MG: 20 SOLUTION ORAL at 11:27

## 2021-09-07 RX ADMIN — MESALAMINE 4 G: 4 ENEMA RECTAL at 05:33

## 2021-09-07 RX ADMIN — CETIRIZINE HYDROCHLORIDE 10 MG: 10 TABLET, FILM COATED ORAL at 11:25

## 2021-09-07 RX ADMIN — ALBUTEROL SULFATE 2.5 MG: 2.5 SOLUTION RESPIRATORY (INHALATION) at 15:53

## 2021-09-07 RX ADMIN — ALBUTEROL SULFATE 2.5 MG: 2.5 SOLUTION RESPIRATORY (INHALATION) at 07:22

## 2021-09-07 RX ADMIN — GLYCOPYRROLATE 1 MG: 1 TABLET ORAL at 16:20

## 2021-09-07 RX ADMIN — FAMOTIDINE 20 MG: 20 TABLET ORAL at 11:25

## 2021-09-07 RX ADMIN — LISINOPRIL 2.5 MG: 2.5 TABLET ORAL at 11:24

## 2021-09-07 RX ADMIN — FLUTICASONE PROPIONATE 1 SPRAY: 50 SPRAY, METERED NASAL at 11:28

## 2021-09-07 RX ADMIN — BUDESONIDE 500 MCG: 0.5 SUSPENSION RESPIRATORY (INHALATION) at 07:22

## 2021-09-07 NOTE — PROGRESS NOTES
Wound Ostomy Continence Nurse  Consult Note       NAME:  Sylwia Sweeney  MEDICAL RECORD NUMBER:  [id number]  AGE: 43 y.o. GENDER: female  : 1985  TODAY'S DATE:  2021    Subjective   Reason for [id number] Nurse Evaluation and Assessment: perianal/vaginal area      Sylwia Sweeney is a 43 y.o. female referred by:   [x] Physician  [] Nursing  [] Other:     Wound Identification:  Wound Type: mositure assocaited/fungal  Contributing Factors: decreased mobility, malnutrition and anavaginal fistula    Wound History: Per nursing and patient, having excessive itching to the vaginal labia and perianal areas. Patient has history of anovaginal fistula. Labia/perianal areas are red & itchy, without relief from protective barrier with zinc. Skin is intact. Skin to the peg tub and trach sites look WDL. Current Wound Care Treatment: Recommending ET mix topical to vaginal & perineal areas TID    Patient Goal of Care:  [] Wound Healing  [] Odor Control  [] Palliative Care  [] Pain Control   [x] Other: itching relief         PAST MEDICAL HISTORY    History reviewed. No pertinent past medical history. PAST SURGICAL HISTORY    History reviewed. No pertinent surgical history. FAMILY HISTORY    History reviewed. No pertinent family history.     SOCIAL HISTORY    Social History     Tobacco Use    Smoking status: Never Smoker    Smokeless tobacco: Never Used   Substance Use Topics    Alcohol use: Not on file    Drug use: Not on file       ALLERGIES    Allergies   Allergen Reactions    Amoxicillin-Pot Clavulanate      Other reaction(s): Unknown    Ibuprofen      Other reaction(s): Unknown, Unknown    Penicillins Hives     Other reaction(s): Unknown    Ondansetron Rash     blotchy arrythmia noted to right arm after giving IV zofran 2015  blotchy arrythmia noted to right arm after giving IV zofran 2015      Vancomycin Rash    Amoxicillin     Eggs-Apples-Oats [Alimentum]     Food Other (See Comments)    Milk-Related Compounds     Orange Juice [Orange Oil]     Strawberry (Diagnostic)        MEDICATIONS    No current facility-administered medications on file prior to encounter.      Current Outpatient Medications on File Prior to Encounter   Medication Sig Dispense Refill    diphenhydrAMINE (BANOPHEN) 12.5 MG/5ML liquid 12.5 mg by Per G Tube route nightly      hydrocortisone 1 % cream Apply topically 2 times daily Apply topically 2 times daily to rectal irritation      nystatin (MYCOSTATIN) 407498 UNIT/GM powder Apply topically as needed Apply to G-tube site as directed      albuterol (PROVENTIL) (2.5 MG/3ML) 0.083% nebulizer solution Take 2.5 mg by nebulization 4 times daily      budesonide (PULMICORT) 0.5 MG/2ML nebulizer suspension Take 1 ampule by nebulization 2 times daily      cetirizine (ZYRTEC) 10 MG tablet 10 mg by Per G Tube route daily      ipratropium (ATROVENT) 0.02 % nebulizer solution Take 0.5 mg by nebulization 4 times daily      metoclopramide (REGLAN) 10 MG tablet 10 mg by Per G Tube route three times daily      Cholecalciferol (VITAMIN D3) 50 MCG (2000 UT) TABS 2 tablets by Per G Tube route daily      polyethylene glycol (GLYCOLAX) 17 GM/SCOOP powder 17 g by Per G Tube route 2 times daily      lisinopril (PRINIVIL;ZESTRIL) 2.5 MG tablet Take 2.5 mg by mouth daily      prazosin (MINIPRESS) 2 MG capsule Take 2 mg by mouth nightly      mirtazapine (REMERON) 15 MG tablet Take 15 mg by mouth nightly      traZODone (DESYREL) 50 MG tablet Take 100 mg by mouth nightly      famotidine (PEPCID) 40 MG tablet TAKE 1 TABLET BY MOUTH TWICE A DAY 30 MINUTES BEFORE BREAKFAST AND DINNER      fluticasone (FLONASE) 50 MCG/ACT nasal spray 1 spray by Nasal route daily      ketoconazole (NIZORAL) 2 % cream Apply topically 2 times daily      potassium chloride 20 MEQ/15ML (10%) solution Take 15 mLs by mouth daily      carboxymethylcellulose 1 % ophthalmic solution Place 1 drop into both eyes 2 times daily      clindamycin (CLEOCIN T) 1 % lotion Apply topically 2 times daily Indications: Acne Flare Apply topically to face 2 times daily.       mesalamine (CANASA) 1000 MG suppository Place 1,000 mg rectally every 72 hours      montelukast (SINGULAIR) 10 MG tablet 10 mg by Per G Tube route daily      glycopyrrolate (CUVPOSA) 1 MG/5ML SOLN solution 1 mg by Per G Tube route 3 times daily       docusate (COLACE) 50 MG/5ML liquid 100 mg by Per G Tube route 2 times daily      Multiple Vitamins-Minerals (MULTIVITAMIN) LIQD by Gastric Tube route daily As per directions         Objective    /77   Pulse 79   Temp 98.2 °F (36.8 °C) (Axillary)   Resp 18   Ht 4' 9\" (1.448 m)   Wt 74 lb (33.6 kg)   LMP  (LMP Unknown)   SpO2 100%   BMI 16.01 kg/m²     LABS:  WBC:    Lab Results   Component Value Date    WBC 5.5 09/07/2021     H/H:    Lab Results   Component Value Date    HGB 15.2 09/07/2021    HCT 44.2 09/07/2021     PTT:  No results found for: APTT, PTT[APTT}  PT/INR:  No results found for: PROTIME, INR  HgBA1c:  No results found for: LABA1C    Assessment   Charles Risk Score: Charles Scale Score: 18    Patient Active Problem List   Diagnosis    Acute blood loss anemia    Allergy status to penicillin    Constipation    Dependence on wheelchair    DVT prophylaxis    Dysphagia    Exotropia of right eye    Foot callus    [facility] status [facility]    GERD (gastroesophageal reflux disease)    GI bleeding    Infantile cerebral palsy ([facility]    Intellectual disability    Major depressive disorder, single episode, unspecified    Malnutrition of mild degree ([facility])    MDRO (multiple drug resistant organisms) resistance    Mood disorder ([facility]    Nausea and vomiting    Obstructive sleep apnea (adult) (pediatric)    Onychomycosis    PEG tube malfunction ([facility]    Pancreatitis    Personal history of other diseases of the digestive system    Personal history of urinary (tract) infections    Intentional self-harm by unspecified sharp object, initial encounter ([facility]    Rectal prolapse    Squamous cell papilloma of anal canal    Suicidal ideations    Tracheostomy dependent ([facility]    Tracheostomy status ([facility]    Underweight    Unintentional weight loss    Unspecified hemorrhoids    Suicidal ideation         Plan   Plan of Care:  See above. Patient anticipates discharge today    Specialty Bed Required : N/A   [] Low Air Loss   [] Pressure Redistribution  [] Fluid Immersion  [] Bariatric  [] Other:     Current Diet: Diet NPO  ADULT TUBE FEEDING; PEG; Peptide Based;  Bolus; 4 TIMES DAILY; 240; 100; Q 4 hours  Dietician consult:  Yes    Discharge Plan:  Placement for patient upon discharge: home with support    Patient appropriate for Outpatient [address]: N/A    Referrals:  []   [] [facility]  [] Supplies  [] Other    Patient/Caregiver Teaching:  Level of patient/caregiver understanding able to:   [] Indicates understanding       [] Needs reinforcement  [] Unsuccessful      [] Verbal Understanding  [] Demonstrated understanding       [] No evidence of learning  [] Refused teaching         [x] N/A       Electronically signed by BRITTNI Francois, RN, Tisha Woodall on 9/7/2021 at 2:59 PM

## 2021-09-07 NOTE — CONSULTS
Inpatient consult to GI  Consult performed by: Gilmar Moyer MD  Consult ordered by: Andra Oakley DO          Patient Name: Radha Feliciano Date: 9/3/2021  2:00 PM  MR #: [id number]  : 1985    Attending Physician: Andra Oakley DO  Reason for consult:     History of Presenting Illness:      Anselmo Duque is a 43 y.o. female on hospital day 4 with a history of cerebral palsy, chronic PEG in combination with dysphagia diet, laryngectomy with tracheostomy, rectovaginal fistula and proctitis. Past surgical history significant for PEG tube and tracheotomy. Family history is negative for GI malignancies. Social history no noted history of nicotine, EtOH or illicit drug use. History Obtained From:  electronic medical record  GI consult for chronic prostatitis & esophageal web-patient was admitted with suicidal ideations. GI was asked to evaluate chronic prostatitis and esophageal web. Patient was recently seen in the office for similar complaints with recommendations for follow-up and further care at [facility] as she is well-established care and has a complex medical history. At baseline , she has a chronic PEG in combination with dysphagia diet, history of lung laryngectomy and tracheostomy, reported history of rectal vaginal fistula and proctitis. Review of record showed that patient has been established with [facility] had recent EGD that showed Distal esophageal stricture s/p CRE balloon dilation (8 mm - 9 mm). Of note patient was previously evaluated at [facility] for blood per rectum and abnormal CT imaging. Per colorectal surgery at [facility], patient had CT abdomen pelvis on 2019 that showed circumferential rectal wall thickening with hyper enhancing rectal mucosa inflammatory changes and linear air tract was seen from the rectum to the vagina.   Of note patient had papillomata squamous mucosa and been managed for proctitis with mesalamine per colorectal surgery recommendations. On arrival had blood work that showed no significant abnormalities. History:      History reviewed. No pertinent past medical history. History reviewed. No pertinent surgical history. Family History  History reviewed. No pertinent family history. [] Unable to obtain due to ventilated and/ or neurologic status  Social History     Socioeconomic History    Marital status: Single     Spouse name: Not on file    Number of children: Not on file    Years of education: Not on file    Highest education level: Not on file   Occupational History    Not on file   Tobacco Use    Smoking status: Never Smoker    Smokeless tobacco: Never Used   Substance and Sexual Activity    Alcohol use: Not on file    Drug use: Not on file    Sexual activity: Not on file   Other Topics Concern    Not on file   Social History Narrative    Not on file     Social Determinants of Health     Financial Resource Strain:     Difficulty of Paying Living Expenses:    Food Insecurity:     Worried About Running Out of Food in the Last Year:     [address] in the Last Year:    Transportation Needs:     Lack of Transportation (Medical):      Lack of Transportation (Non-Medical):    Physical Activity:     Days of Exercise per Week:     Minutes of Exercise per Session:    Stress:     Feeling of Stress :    Social Connections:     Frequency of Communication with Friends and Family:     Frequency of Social Gatherings with Friends and Family:     Attends Nondenominational Services:     Active Member of Clubs or Organizations:     Attends Club or Organization Meetings:     Marital Status:    Intimate Partner Violence:     Fear of Current or Ex-Partner:     Emotionally Abused:     Physically Abused:     Sexually Abused:       [] Unable to obtain due to ventilated and/ or neurologic status    Home Medications:      Medications Prior to Admission: diphenhydrAMINE (BANOPHEN) 12.5 MG/5ML liquid, 12.5 mg by Per G Tube route nightly  Lidocaine, Anorectal, (ANECREAM5) 5 % CREA, Apply topically every 2 hours as needed Apply topically for rectal pain  hydrocortisone 1 % cream, Apply topically 2 times daily Apply topically 2 times daily to rectal irritation  nystatin (MYCOSTATIN) 969119 UNIT/GM powder, Apply topically as needed Apply to G-tube site as directed  albuterol (PROVENTIL) (2.5 MG/3ML) 0.083% nebulizer solution, Take 2.5 mg by nebulization 4 times daily  budesonide (PULMICORT) 0.5 MG/2ML nebulizer suspension, Take 1 ampule by nebulization 2 times daily  cetirizine (ZYRTEC) 10 MG tablet, 10 mg by Per G Tube route daily  ipratropium (ATROVENT) 0.02 % nebulizer solution, Take 0.5 mg by nebulization 4 times daily  metoclopramide (REGLAN) 10 MG tablet, 10 mg by Per G Tube route three times daily  Cholecalciferol (VITAMIN D3) 50 MCG (2000 UT) TABS, 2 tablets by Per G Tube route daily  polyethylene glycol (GLYCOLAX) 17 GM/SCOOP powder, 17 g by Per G Tube route 2 times daily  lisinopril (PRINIVIL;ZESTRIL) 2.5 MG tablet, Take 2.5 mg by mouth daily  prazosin (MINIPRESS) 2 MG capsule, Take 2 mg by mouth nightly  FLUoxetine (PROZAC) 10 MG capsule, Take 30 mg by mouth daily  risperiDONE (RISPERDAL) 2 MG tablet, Take 2 mg by mouth nightly  mirtazapine (REMERON) 15 MG tablet, Take 15 mg by mouth nightly  traZODone (DESYREL) 50 MG tablet, Take 100 mg by mouth nightly  famotidine (PEPCID) 40 MG tablet, TAKE 1 TABLET BY MOUTH TWICE A DAY 30 MINUTES BEFORE BREAKFAST AND DINNER  baclofen (LIORESAL) 20 MG tablet, 1 tablet by mouth 4 times a day  fluticasone (FLONASE) 50 MCG/ACT nasal spray, 1 spray by Nasal route daily  ketoconazole (NIZORAL) 2 % cream, Apply topically 2 times daily  potassium chloride 20 MEQ/15ML (10%) solution, Take 15 mLs by mouth daily  [DISCONTINUED] polyethylene glycol (GLYCOLAX) 17 GM/SCOOP powder, 17 g 2 times daily  carboxymethylcellulose 1 % ophthalmic solution, Place 1 drop into both eyes 2 times daily  clindamycin (CLEOCIN T) 1 % lotion, Apply topically 2 times daily Indications: Acne Flare Apply topically to face 2 times daily.   mesalamine (CANASA) 1000 MG suppository, Place 1,000 mg rectally every 72 hours  montelukast (SINGULAIR) 10 MG tablet, 10 mg by Per G Tube route daily  glycopyrrolate (CUVPOSA) 1 MG/5ML SOLN solution, 1 mg by Per G Tube route 3 times daily   docusate (COLACE) 50 MG/5ML liquid, 100 mg by Per G Tube route 2 times daily  Multiple Vitamins-Minerals (MULTIVITAMIN) LIQD, by Gastric Tube route daily As per directions  [DISCONTINUED] metroNIDAZOLE (FLAGYL) 500 MG tablet, TAKE 1 TABLET BY MOUTH THREE TIMES DAILY FOR 14 DAYS  [DISCONTINUED] Psyllium (HYDROCIL) 95 % POWD, Take by mouth  [DISCONTINUED] escitalopram (LEXAPRO) 10 MG tablet, Take 10 mg by mouth daily  [DISCONTINUED] hydrOXYzine (ATARAX) 25 MG tablet, Take 25 mg by mouth 3 times daily as needed  [DISCONTINUED] magnesium hydroxide (MILK OF MAGNESIA) 400 MG/5ML suspension, Take 30 mLs by mouth daily as needed  [DISCONTINUED] senna (SENOKOT) 8.6 MG tablet, Take 8.6 mg by mouth nightly  [DISCONTINUED] Ergocalciferol (DRISDOL) 200 MCG/ML drops, Take 1,040 Units by mouth daily  [DISCONTINUED] albuterol (PROVENTIL) (2.5 MG/3ML) 0.083% nebulizer solution, Inhale 2.5 mg into the lungs every 6 hours as needed  [DISCONTINUED] diphenhydrAMINE (BENADRYL) 12.5 MG/5ML elixir, Take 12.5 mg by mouth nightly  [DISCONTINUED] ipratropium (ATROVENT) 0.02 % nebulizer solution, Inhale 0.5 mg into the lungs 4 times daily  [DISCONTINUED] albuterol (PROVENTIL) (2.5 MG/3ML) 0.083% nebulizer solution, Take 2.5 mg by nebulization 4 times daily  [DISCONTINUED] budesonide (PULMICORT) 0.5 MG/2ML nebulizer suspension, Take 1 ampule by nebulization 2 times daily  [DISCONTINUED] psyllium (NATURAL FIBER LAXATIVE) 58.6 % powder, 1 packet by Per G Tube route 2 times daily 1T in water  [DISCONTINUED] metoclopramide (REGLAN) 5 MG/5ML solution, 10 mg by PEG Tube route 4 times daily (before meals and nightly) 10 mg=10ml    Current Hospital Medications:   Scheduled Meds:   albuterol  2.5 mg Nebulization 4x Daily    budesonide  500 mcg Nebulization BID    polyvinyl alcohol  1 drop Both Eyes BID    cetirizine  10 mg Per G Tube Daily    famotidine  20 mg Oral Daily    fluticasone  1 spray Nasal Daily    glycopyrrolate  1 mg Per G Tube TID    hydrocortisone   Topical BID    lisinopril  2.5 mg Oral Daily    montelukast  10 mg Per G Tube Daily    mirtazapine  15 mg Oral Nightly    prazosin  2 mg Oral Nightly    risperiDONE  2 mg Oral Nightly    traZODone  100 mg Oral Nightly    mesalamine  4 g Rectal Q72H    FLUoxetine  40 mg Oral Daily    sodium chloride flush  5-40 mL IntraVENous 2 times per day    enoxaparin  40 mg SubCUTAneous Daily     Continuous Infusions:   sodium chloride       PRN Meds:.baclofen, docusate, sodium chloride flush, sodium chloride, polyethylene glycol, acetaminophen **OR** acetaminophen   sodium chloride        Allergies:      Allergies   Allergen Reactions    Amoxicillin-Pot Clavulanate      Other reaction(s): Unknown    Ibuprofen      Other reaction(s): Unknown, Unknown    Penicillins Hives     Other reaction(s): Unknown    Ondansetron Rash     blotchy arrythmia noted to right arm after giving IV zofran 4/24/2015  blotchy arrythmia noted to right arm after giving IV zofran 4/24/2015      Vancomycin Rash    Amoxicillin     Eggs-Apples-Oats [Alimentum]     Food Other (See Comments)    Milk-Related Compounds     Orange Juice [Orange Oil]     Strawberry (Diagnostic)       Review of Systems:       [] CV, Resp, Neuro, , and all other systems reviewed and negative other than listed in HPI.     Unable to obtain due to altered mental status    Objective Findings:     Vitals:   Vitals:    09/06/21 1613 09/06/21 2013 09/06/21 2316 09/06/21 2319   BP:   106/69 106/69   Pulse:       Resp: 16 16     Temp:       TempSrc:       SpO2: 98% 93%     Weight:       Height: female admitted with suicidal ideations,  GI was asked to evaluate chronic prostatitis and esophageal web. 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 Review of record showed that patient has been established with [facility] had recent EGD that showed Distal esophageal stricture s/p CRE balloon dilation (8 mm - 9 mm). Of note patient was previously evaluated at [facility] for blood per rectum and abnormal CT imaging. Per colorectal surgery at [facility], patient had CT abdomen pelvis on December 2019 that showed circumferential rectal wall thickening with hyper enhancing rectal mucosa inflammatory changes and linear air tract was seen from the rectum to the vagina. Of note patient had papillomata squamous mucosa and been managed for proctitis with mesalamine per colorectal surgery recommendations. On arrival had blood work that showed no significant abnormalities. Plan:   -Medical management per primary team  -serial H&H trend and transfuse accordingly to keep hemoglobin greater than 7.5 or for hemodynamic instability  -Pending clinical course and  given the complexity of her past medical history. Recommend transfer to [facility] to avoid fragmented care and for multidisciplinary approach, patient is established at [facility] and had appointment on 9/1, she is scheduled for EGD and colonoscopy at [facility]  Comments: Thank you for allowing us to participate in the care of this patient. Will continue to follow. Please call if questions or concerns arise.     Electronically signed by Omer Ball MD on 9/7/2021 at 7:10 AM    Please note this report has been partially produced using speech recognition software and may cause contain errors related to that system including grammar, punctuation and spelling as well as words and phrases that may seem inappropriate. If there are questions or concerns please feel free to contact me to clarify.

## 2021-09-07 NOTE — PROGRESS NOTES
Care of pt. NG tube in place. Flushed with 150 H2O following feeding. Pt. Does self suctioning. Pt. Up on her bed answeres questions with yes or no. One to one due to suiucidal ideations.

## 2021-09-07 NOTE — PROGRESS NOTES
[facility] Respiratory Therapy Evaluation   Current Order: albuterol qid      Home Regimen: qid      Ordering Physician:   Re-evaluation Date:  9/10     Diagnosis: suicidal ideation      Patient Status: Stable    The following MDI Criteria must be met in order to convert aerosol to MDI with spacer. If unable to meet, MDI will be converted to aerosol:  []  Patient able to demonstrate the ability to use MDI effectively  []  Patient alert and cooperative  []  Patient able to take deep breath with 5-10 second hold  []  Medication(s) available in this delivery method   []  Peak flow greater than or equal to 200 ml/min            Current Order Substituted To  (same drug, same frequency)   Aerosol to MDI [] Albuterol Sulfate 0.083% unit dose by aerosol Albuterol Sulfate MDI 2 puffs by inhalation with spacer    [] Levalbuterol 1.25 mg unit dose by aerosol Levalbuterol MDI 2 puffs by inhalation with spacer    [] Levalbuterol 0.63 mg unit dose by aerosol Levalbuterol MDI 2 puffs by inhalation with spacer    [] Ipratropium Bromide 0.02% unit dose by aerosol Ipratropium Bromide MDI 2 puffs by inhalation with spacer    [] Duoneb (Ipratropium + Albuterol) unit dose by aerosol Ipratropium MDI + Albuterol MDI 2 puffs by inhalation w/spacer   MDI to Aerosol [] Albuterol Sulfate MDI Albuterol Sulfate 0.083% unit dose by aerosol    [] Levalbuterol MDI 2 puffs by inhalation Levalbuterol 1.25 mg unit dose by aerosol    [] Ipratropium Bromide MDI by inhalation Ipratropium Bromide 0.02% unit dose by aerosol    [] Combivent (Ipratropium + Albuterol) MDI by inhalation Duoneb (Ipratropium + Albuterol) unit dose by aerosol       Treatment Assessment [Frequency/Schedule]:  Change frequency to: __no change________________________________________________per Protocol, P&T, MEC      Points 0 1 2 3 4   Pulmonary Status  Non-Smoker  []   Smoking history   < 20 pack years  []   Smoking history  ?  20 pack years  []   Pulmonary Disorder  (acute or chronic)  [x]   Severe or Chronic w/ Exacerbation  []     Surgical Status No [x]   Surgeries     General []   Surgery Lower []   Abdominal Thoracic or []   Upper Abdominal Thoracic with  PulmonaryDisorder  []     Chest X-ray Clear/Not  Ordered     [x]  Chronic Changes  Results Pending  []  Infiltrates, atelectasis, pleural effusion, or edema  []  Infiltrates in more than one lobe []  Infiltrate + Atelectasis, &/or pleural effusion  []    Respiratory Pattern Regular,  RR = 12-20 [x]  Increased,  RR = 21-25 []  MORALES, irregular,  or RR = 26-30 []  Decreased FEV1  or RR = 31-35 []  Severe SOB, use  of accessory muscles, or RR ? 35  []    Mental Status Alert, oriented,  Cooperative [x]  Confused but Follows commands []  Lethargic or unable to follow commands []  Obtunded  []  Comatose  []    Breath Sounds Clear to  auscultation  []  Decreased unilaterally or  in bases only []  Decreased  bilaterally  [x]  Crackles or intermittent wheezes []  Wheezes []    Cough Strong, Spontan., & nonproductive [x]  Strong,  spontaneous, &  productive []  Weak,  Nonproductive []  Weak, productive or  with wheezes []  No spontaneous  cough or may require suctioning []    Level of Activity Ambulatory []  Ambulatory w/ Assist  [x]  Non-ambulatory []  Paraplegic []  Quadriplegic []    Total    Score:____6___     Triage Score:___4_____      Tri       Triage:     1. (>20) Freq: Q3    2. (16-20) Freq: Q4   3. (11-15) Freq: QID & Albuterol Q2 PRN    4. (6-10) Freq: TID & Albuterol Q2 PRN    5. (0-5) Freq Q4prn

## 2021-09-07 NOTE — PROGRESS NOTES
Pt alert and oriented X4, pt non verbal but answers yes or no questions. Pt has a 1:1 in place for suicidal ideation. 0030: Pt sleeping and wanted to wait on enema. 0500: Pt awake and saying she will take the enema; pt unable to hold medication in long before needing to use the restroom. 0530; pt using the rest room, small liquid stool noted.  Electronically signed by Nuno Schroeder RN on 9/7/2021 at 6:07 AM

## 2021-09-07 NOTE — CARE COORDINATION
DAISHAW arranged transportation for patient to be transferred back to [address] [place] [address] via [address] [place] [address].  She will be transferred today at 5:30pm.  Electronically signed by SHARON Prasons, KEDAR on 9/7/21 at 3:07 PM EDT

## 2021-09-07 NOTE — DISCHARGE SUMMARY
Discharge Summary    Date: 9/7/2021  Patient Name: Monty Hercules YOB: 1985 Age: 43 y.o. Admit Date: 9/3/2021  Discharge Date: 9/7/2021  Discharge Condition: Brett Montejo    Admission Diagnosis  Suicidal ideation (R45.851)     Discharge Diagnosis  Principal Problem: Major depressive disorder, recurrent, unspecified (HCC)Active Problems: Suicidal ideationResolved Problems: * No resolved hospital problems. [facility] Stay  Narrative of Hospital Course:  Admitted 9/3/2021 for suicidal intent/attempt in setting of multiple chronic health conditions including cerebral palsy with associated physical handicap. Patient seen by psychiatry, who adjusted medications and spoke with her, eventually deeming her safe for discharge with outpatient follow-up. Patient did have complaints of flares of chronic GI symptoms, for which she follows with [facility] as an outpatient (just recently missed scheduled EGD/colonoscopy on 9/1/2021). Patient improved clinically and in regards to mood, and was deemed appropriate for discharge back to [facility] on 9/7/2021 with outpatient follow-up with PCP, psychiatry clinic, and with [address] [place] [address] outpatient clinic. Consultants:  IP CONSULT TO PSYCHIATRYIP CONSULT TO CASE MANAGEMENTIP CONSULT TO DIETITIANIP CONSULT TO [address] [place], [place] [address] TO [facility] CONSULT TO DIETITIAN    Surgeries/procedures Performed:       Treatments:   Therapies        Discharge Plan/Disposition:  Home    Hospital/Incidental Findings Requiring Follow Up:    Patient Instructions:    Diet:    Activity:  For number of days (if applicable): Other Instructions:    Provider Follow-Up:   No follow-ups on file.      Significant Diagnostic Studies:    Recent Labs:  Admission on 09/03/2021Sodium                                        Date: 09/03/2021Value: 137         Ref range: 135 - 144 mEq/L    Status: FinalPotassium                                     Date: 09/03/2021Value: 4.4         Ref range: 3.4 - 4.9 mEq/L Status: FinalChloride                                      Date: 09/03/2021Value: 100         Ref range: 95 - 107 mEq/L     Status: FinalCO2                                           Date: 09/03/2021Value: 23          Ref range: 20 - 31 mEq/L      Status: FinalAnion Gap                                     Date: 09/03/2021Value: 14          Ref range: 9 - 15 mEq/L       Status: FinalGlucose                                       Date: 09/03/2021Value: 95          Ref range: 70 - 99 mg/dL      Status: FinalBUN                                           Date: 09/03/2021Value: 12          Ref range: 6 - 20 mg/dL       Status: FinalCREATININE                                    Date: 09/03/2021Value: 0.55        Ref range: 0.50 - 0.90 mg/dL  Status: FinalGFR Non-                      Date: 09/03/2021Value: >60.0       Ref range: >60                Status: Final              Comment: >60 mL/min/1.73m2 EGFR, calc. for ages 25 and older using theMDRD formula (not corrected for weight), is valid for stablerenal function. GFR                           Date: 09/03/2021Value: >60.0       Ref range: >60                Status: Final              Comment: >60 mL/min/1.73m2 EGFR, calc. for ages 25 and older using theMDRD formula (not corrected for weight), is valid for stablerenal function. Calcium                                       Date: 09/03/2021Value: 9.8         Ref range: 8.5 - 9.9 mg/dL    Status: FinalTotal Protein                                 Date: 09/03/2021Value: 6.8         Ref range: 6.3 - 8.0 g/dL     Status: FinalAlbumin                                       Date: 09/03/2021Value: 3.8         Ref range: 3.5 - 4.6 g/dL     Status: FinalTotal Bilirubin                               Date: 09/03/2021Value: 0.3         Ref range: 0.2 - 0.7 mg/dL    Status: FinalAlkaline Phosphatase                          Date: 09/03/2021Value: 88          Ref range: 40 - 130 U/L       Status: FinalALT Date: 09/03/2021Value: 14          Ref range: 0 - 33 U/L         Status: FinalAST                                           Date: 09/03/2021Value: 28          Ref range: 0 - 35 U/L         Status: Final              Comment: Specimen hemolysis has exceeded the interference as definedby Roche. Value may be falsely increased. Suggestrecollection if clinically indicated. Globulin                                      Date: 09/03/2021Value: 3.0         Ref range: 2.3 - 3.5 g/dL     Status: FinalEthanol Lvl                                   Date: 09/03/2021Value: <10         Ref range: mg/dL              Status: FinalEthanol percent                               Date: 09/03/2021Value: Not indicated                   Ref range: G/dL               Status: [address] [facility]                                           Date: 09/03/2021Value: 7.7         Ref range: 4.8 - 10.8 K/uL    Status: FinalRBC                                           Date: 09/03/2021Value: 4.33        Ref range: 4.20 - 5.40 M/uL   Status: FinalHemoglobin                                    Date: 09/03/2021Value: 13.7        Ref range: 12.0 - 16.0 g/dL   Status: FinalHematocrit                                    Date: 09/03/2021Value: 40.5        Ref range: 37.0 - 47.0 %      Status: FinalMCV                                           Date: 09/03/2021Value: 93.4        Ref range: 82.0 - 100.0 fL    Status: [address] [facility]                                           Date: 09/03/2021Value: 31.7*       Ref range: 27.0 - 31.3 pg     Status: [address]                                          Date: 09/03/2021Value: 33.9        Ref range: 33.0 - 37.0 %      Status: FinalRDW                                           Date: 09/03/2021Value: 13.4        Ref range: 11.5 - 14.5 %      Status: FinalPlatelets                                     Date: 09/03/2021Value: 282         Ref range: 130 - 400 K/uL     Status: FinalNeutrophils % Date: 09/03/2021Value: 74.8        Ref range: %                  Status: FinalLymphocytes %                                 Date: 09/03/2021Value: 14.2        Ref range: %                  Status: FinalMonocytes %                                   Date: 09/03/2021Value: 6.2         Ref range: %                  Status: FinalEosinophils %                                 Date: 09/03/2021Value: 4.2         Ref range: %                  Status: FinalBasophils %                                   Date: 09/03/2021Value: 0.6         Ref range: %                  Status: FinalNeutrophils Absolute                          Date: 09/03/2021Value: 5.7         Ref range: 1.4 - 6.5 K/uL     Status: FinalLymphocytes Absolute                          Date: 09/03/2021Value: 1.1         Ref range: 1.0 - 4.8 K/uL     Status: FinalMonocytes Absolute                            Date: 09/03/2021Value: 0.5         Ref range: 0.2 - 0.8 K/uL     Status: FinalEosinophils Absolute                          Date: 09/03/2021Value: 0.3         Ref range: 0.0 - 0.7 K/uL     Status: FinalBasophils Absolute                            Date: 09/03/2021Value: 0.0         Ref range: 0.0 - 0.2 K/uL     Status: FinalTSH                                           Date: 09/03/2021Value: 1.400       Ref range: 0.440 - 3.860 uI*  Status: FinalSalicylate, Serum                             Date: 09/03/2021Value: <0.3*       Ref range: 15.0 - 30.0 mg/dL  Status: Final              Comment: Anti-pyretic:  3.0-10.0 mg/dLAnti-inflammatory:  15.0-30.0 mg/dLToxic: >30.0 mg/dLAcetaminophen Level                           Date: 09/03/2021Value: <5*         Ref range: 10 - 30 ug/mL      Status: FinalTotal CK                                      Date: 09/03/2021Value: 258*        Ref range: 0 - 170 U/L        Status: KrcaxTGVN-TqM-5, NAAT                              Date: 09/03/2021Value: Not Detected                   Ref range: Not Detected       Status: Final              Comment: Rapid NAAT:   Negative results should be treated as presumptive and,if inconsistent with clinical signs and symptoms or necessary forpatient management, should be tested with an alternative molecularassay. Negative results do not preclude SARS-CoV-2 infection andshould not be used as the sole basis for patient management decisions. This test has been authorized by the FDA under an Emergency UseAuthorization (EUA) for use by authorized laboratories. Fact sheet for [employer] sheet for Patients: Pandadk: Isothermal Nucleic Acid AmplificationCK-MB                                         Date: 09/03/2021Value: 9.1*        Ref range: 0.0 - 3.8 ng/mL    Status: FinalCK-MB Index                                   Date: 09/03/2021Value: 3.5         Ref range: 0.0 - 3.5 %        Status: FinalVentricular Rate                              Date: 09/04/2021Value: 80          Ref range: BPM                Status: PreliminaryAtrial Rate                                   Date: 09/04/2021Value: 80          Ref range: BPM                Status: PreliminaryP-R Interval                                  Date: 09/04/2021Value: 166         Ref range: ms                 Status: PreliminaryQRS Duration                                  Date: 09/04/2021Value: 72          Ref range: ms                 Status: PreliminaryQ-T Interval                                  Date: 09/04/2021Value: 380         Ref range: ms                 Status: PreliminaryQTc Calculation (Bazett)                      Date: 09/04/2021Value: 438         Ref range: ms                 Status: PreliminaryP Axis                                        Date: 09/04/2021Value: 54          Ref range: degrees            Status: PreliminaryR Axis                                        Date: 09/04/2021Value: 51          Ref range: degrees            Status: PreliminaryT Axis                                        Date: 09/04/2021Value: 44          Ref range: degrees            Status: PreliminaryWBC                                           Date: 09/06/2021Value: 5.5         Ref range: 4.8 - 10.8 K/uL    Status: FinalRBC                                           Date: 09/06/2021Value: 4.67        Ref range: 4.20 - 5.40 M/uL   Status: FinalHemoglobin                                    Date: 09/06/2021Value: 14.7        Ref range: 12.0 - 16.0 g/dL   Status: FinalHematocrit                                    Date: 09/06/2021Value: 43.4        Ref range: 37.0 - 47.0 %      Status: FinalMCV                                           Date: 09/06/2021Value: 93.0        Ref range: 82.0 - 100.0 fL    Status: [address]                                           Date: 09/06/2021Value: 31.5*       Ref range: 27.0 - 31.3 pg     Status: [address]                                          Date: 09/06/2021Value: 33.9        Ref range: 33.0 - 37.0 %      Status: FinalRDW                                           Date: 09/06/2021Value: 13.4        Ref range: 11.5 - 14.5 %      Status: FinalPlatelets                                     Date: 09/06/2021Value: 292         Ref range: 130 - 400 K/uL     Status: FinalNeutrophils %                                 Date: 09/06/2021Value: 69.6        Ref range: %                  Status: FinalLymphocytes %                                 Date: 09/06/2021Value: 18.4        Ref range: %                  Status: FinalMonocytes %                                   Date: 09/06/2021Value: 6.6         Ref range: %                  Status: FinalEosinophils %                                 Date: 09/06/2021Value: 4.9         Ref range: %                  Status: FinalBasophils %                                   Date: 09/06/2021Value: 0.5         Ref range: %                  Status: FinalNeutrophils Absolute                          Date: 09/06/2021Value: 3.8 Ref range: 1.4 - 6.5 K/uL     Status: FinalLymphocytes Absolute                          Date: 09/06/2021Value: 1.0         Ref range: 1.0 - 4.8 K/uL     Status: FinalMonocytes Absolute                            Date: 09/06/2021Value: 0.4         Ref range: 0.2 - 0.8 K/uL     Status: FinalEosinophils Absolute                          Date: 09/06/2021Value: 0.3         Ref range: 0.0 - 0.7 K/uL     Status: FinalBasophils Absolute                            Date: 09/06/2021Value: 0.0         Ref range: 0.0 - 0.2 K/uL     Status: FinalSodium                                        Date: 09/06/2021Value: 137         Ref range: 135 - 144 mEq/L    Status: FinalPotassium                                     Date: 09/06/2021Value: 4.0         Ref range: 3.4 - 4.9 mEq/L    Status: FinalChloride                                      Date: 09/06/2021Value: 99          Ref range: 95 - 107 mEq/L     Status: FinalCO2                                           Date: 09/06/2021Value: 27          Ref range: 20 - 31 mEq/L      Status: FinalAnion Gap                                     Date: 09/06/2021Value: 11          Ref range: 9 - 15 mEq/L       Status: FinalGlucose                                       Date: 09/06/2021Value: 105*        Ref range: 70 - 99 mg/dL      Status: FinalBUN                                           Date: 09/06/2021Value: 15          Ref range: 6 - 20 mg/dL       Status: FinalCREATININE                                    Date: 09/06/2021Value: 0.55        Ref range: 0.50 - 0.90 mg/dL  Status: FinalGFR Non-                      Date: 09/06/2021Value: >60.0       Ref range: >60                Status: Final              Comment: >60 mL/min/1.73m2 EGFR, calc. for ages 25 and older using theMDRD formula (not corrected for weight), is valid for stablerenal function. GFR                           Date: 09/06/2021Value: >60.0       Ref range: >60                Status: Final Comment: >60 mL/min/1.73m2 EGFR, calc. for ages 25 and older using theMDRD formula (not corrected for weight), is valid for stablerenal function. Calcium                                       Date: 09/06/2021Value: 9.5         Ref range: 8.5 - 9.9 mg/dL    Status: [address] [facility]                                           Date: 09/07/2021Value: 5.5         Ref range: 4.8 - 10.8 K/uL    Status: FinalRBC                                           Date: 09/07/2021Value: 4.75        Ref range: 4.20 - 5.40 M/uL   Status: FinalHemoglobin                                    Date: 09/07/2021Value: 15.2        Ref range: 12.0 - 16.0 g/dL   Status: FinalHematocrit                                    Date: 09/07/2021Value: 44.2        Ref range: 37.0 - 47.0 %      Status: FinalMCV                                           Date: 09/07/2021Value: 93.1        Ref range: 82.0 - 100.0 fL    Status: [address]                                           Date: 09/07/2021Value: 32.0*       Ref range: 27.0 - 31.3 pg     Status: [address]                                          Date: 09/07/2021Value: 34.4        Ref range: 33.0 - 37.0 %      Status: FinalRDW                                           Date: 09/07/2021Value: 13.1        Ref range: 11.5 - 14.5 %      Status: FinalPlatelets                                     Date: 09/07/2021Value: 273         Ref range: 130 - 400 K/uL     Status: FinalNeutrophils %                                 Date: 09/07/2021Value: 59.6        Ref range: %                  Status: FinalLymphocytes %                                 Date: 09/07/2021Value: 22.2        Ref range: %                  Status: FinalMonocytes %                                   Date: 09/07/2021Value: 9.7         Ref range: %                  Status: FinalEosinophils %                                 Date: 09/07/2021Value: 7.8         Ref range: %                  Status: FinalBasophils %                                   Date: 09/07/2021Value: 0.7         Ref range: %                  Status: FinalNeutrophils Absolute                          Date: 09/07/2021Value: 3.3         Ref range: 1.4 - 6.5 K/uL     Status: FinalLymphocytes Absolute                          Date: 09/07/2021Value: 1.2         Ref range: 1.0 - 4.8 K/uL     Status: FinalMonocytes Absolute                            Date: 09/07/2021Value: 0.5         Ref range: 0.2 - 0.8 K/uL     Status: FinalEosinophils Absolute                          Date: 09/07/2021Value: 0.4         Ref range: 0.0 - 0.7 K/uL     Status: FinalBasophils Absolute                            Date: 09/07/2021Value: 0.0         Ref range: 0.0 - 0.2 K/uL     Status: FinalSodium                                        Date: 09/07/2021Value: 137         Ref range: 135 - 144 mEq/L    Status: FinalPotassium                                     Date: 09/07/2021Value: 4.3         Ref range: 3.4 - 4.9 mEq/L    Status: FinalChloride                                      Date: 09/07/2021Value: 99          Ref range: 95 - 107 mEq/L     Status: FinalCO2                                           Date: 09/07/2021Value: 26          Ref range: 20 - 31 mEq/L      Status: FinalAnion Gap                                     Date: 09/07/2021Value: 12          Ref range: 9 - 15 mEq/L       Status: FinalGlucose                                       Date: 09/07/2021Value: 72          Ref range: 70 - 99 mg/dL      Status: FinalBUN                                           Date: 09/07/2021Value: 16          Ref range: 6 - 20 mg/dL       Status: FinalCREATININE                                    Date: 09/07/2021Value: 0.62        Ref range: 0.50 - 0.90 mg/dL  Status: FinalGFR Non-                      Date: 09/07/2021Value: >60.0       Ref range: >60                Status: Final              Comment: >60 mL/min/1.73m2 EGFR, calc. for ages 25 and older using theMDRD formula (not corrected for weight), is valid for tablets by Per G Tube route dailypolyethylene glycol (GLYCOLAX) 17 GM/SCOOP [id number] g by Per G Tube route 2 times dailylisinopril (PRINIVIL;ZESTRIL) 2.5 MG tabletTake 2.5 mg by mouth dailyprazosin (MINIPRESS) 2 MG capsuleTake 2 mg by mouth nightlymirtazapine (REMERON) 15 MG tabletTake 15 mg by mouth nightlytraZODone (DESYREL) 50 MG tabletTake 100 mg by mouth nightlyfamotidine (PEPCID) 40 MG tabletTAKE 1 TABLET BY MOUTH TWICE A DAY 30 MINUTES BEFORE BREAKFAST AND DINNERfluticasone (FLONASE) 50 MCG/ACT nasal spray1 spray by Nasal route dailyketoconazole (NIZORAL) 2 % creamApply topically 2 times dailypotassium chloride 20 MEQ/15ML (10%) solutionTake 15 mLs by mouth dailycarboxymethylcellulose 1 % ophthalmic solutionPlace 1 drop into both eyes 2 times dailyclindamycin (CLEOCIN T) 1 % lotionApply topically 2 times daily Indications: Acne Flare Apply topically to face 2 times daily. mesalamine (CANASA) 1000 MG suppositoryPlace 1,000 mg rectally every 72 hoursmontelukast (SINGULAIR) 10 MG [id number] mg by Per G Tube route dailyglycopyrrolate (CUVPOSA) 1 MG/5ML SOLN solution1 mg by Per G Tube route 3 times daily docusate (COLACE) 50 MG/5ML [id number] mg by Per G Tube route 2 times dailyMultiple Vitamins-Minerals (MULTIVITAMIN) LIQDby Gastric Tube route daily As per directions    Current Discharge Medication ListSTOP taking these medicationsLidocaine, Anorectal, (ANECREAM5) 5 % CREAComments:Reason for Stopping:metroNIDAZOLE (FLAGYL) 500 MG tabletComments:Reason for Stopping:Psyllium (HYDROCIL) 95 % POWDComments:Reason for Stopping:escitalopram (LEXAPRO) 10 MG tabletComments:Reason for Stopping:FLUoxetine (PROZAC) 10 MG capsuleComments:Reason for Stopping:hydrOXYzine (ATARAX) 25 MG tabletComments:Reason for Stopping:magnesium hydroxide (MILK OF MAGNESIA) 400 MG/5ML suspensionComments:Reason for Stopping:senna (SENOKOT) 8.6 MG tabletComments:Reason for Stopping:baclofen (LIORESAL) 20 MG tabletComments:Reason for Stopping:Ergocalciferol (DRISDOL) 200 MCG/ML dropsComments:Reason for Stopping:diphenhydrAMINE (BENADRYL) 12.5 MG/5ML elixirComments:Reason for Stopping:psyllium (NATURAL FIBER LAXATIVE) 58.6 % powderComments:Reason for Stopping:metoclopramide (REGLAN) 5 MG/5ML solutionComments:Reason for Stopping:    Time Spent on Discharge:4E] minutes were spent in patient examination, evaluation, counseling as well as medication reconciliation, prescriptions for required medications, discharge plan, and follow up.     Electronically signed by Winter Nam DO on 9/7/21 at 7:00 PM EDT

## 2021-09-07 NOTE — DISCHARGE INSTR - DIET

## 2021-09-07 NOTE — PROGRESS NOTES
Patient is in bed, sleeping. Respirations are even and unlabored. No signs of distress noted. 1:1 safety observation for patient safety. Will continue to monitor.

## 2021-09-07 NOTE — CARE COORDINATION
PERFECT SERVE SENT TO DR [facility] FOR REQUEST OF DC PLAN FROM PSYCHIATRY PER DR WYNN'S REQUEST.

## 2021-09-07 NOTE — PROGRESS NOTES
[facility]. FOLLOW-UP NOTE       9/7/2021     Patient was seen and examined in person, Chart reviewed   Patient's case discussed with staff/team    Chief Complaint: Depression, SI    Interim History:     Pt seen with Dr Sophy Zaldivar   Pt report feeling better  Feeling sad but not suicidal  Sleep disturbance but doing  Better with melatonin and trazodone  Pt denies any active AH or VH  Has been cooperative with treatment  Appetite:   [x] Normal/Unchanged  [] Increased  [] Decreased      Sleep:       [] Normal/Unchanged  [x] Fair       [] Poor              Energy:    [] Normal/Unchanged  [] Increased  [x] Decreased        SI [] Present  [x] Absent    HI  []Present  [x] Absent     Aggression:  [] yes  [x] no    Patient is [x] able  [] unable to CONTRACT FOR SAFETY     PAST MEDICAL/PSYCHIATRIC HISTORY:   History reviewed. No pertinent past medical history. FAMILY/SOCIAL HISTORY:  History reviewed. No pertinent family history. Social History     Socioeconomic History    Marital status: Single     Spouse name: Not on file    Number of children: Not on file    Years of education: Not on file    Highest education level: Not on file   Occupational History    Not on file   Tobacco Use    Smoking status: Never Smoker    Smokeless tobacco: Never Used   Substance and Sexual Activity    Alcohol use: Not on file    Drug use: Not on file    Sexual activity: Not on file   Other Topics Concern    Not on file   Social History Narrative    Not on file     Social Determinants of Health     Financial Resource Strain:     Difficulty of Paying Living Expenses:    Food Insecurity:     Worried About Running Out of Food in the Last Year:     [address] in the Last Year:    Transportation Needs:     Lack of Transportation (Medical):      Lack of Transportation (Non-Medical):    Physical Activity:     Days of Exercise per Week:     Minutes of Exercise per Session:    Stress:     Feeling of Stress :    Social Connections:     Frequency of Communication with Friends and Family:     Frequency of Social Gatherings with Friends and Family:     Attends Caodaism Services:     Active Member of Clubs or Organizations:     Attends Club or Organization Meetings:     Marital Status:    Intimate Partner Violence:     Fear of Current or Ex-Partner:     Emotionally Abused:     Physically Abused:     Sexually Abused:            ROS:  [x] All negative/unchanged except if checked.  Explain positive(checked items) below:  [] Constitutional  [] Eyes  [] Ear/Nose/Mouth/Throat  [] Respiratory  [] CV  [] GI  []   [] Musculoskeletal  [] Skin/Breast  [] Neurological  [] Endocrine  [] Heme/Lymph  [] Allergic/Immunologic    Explanation:     MEDICATIONS:    Current Facility-Administered Medications:     nystatin, stomahesive in petrolatum (ET MIX), , Topical, 3 times per day, Jaymie Carr DO    albuterol (PROVENTIL) nebulizer solution 2.5 mg, 2.5 mg, Nebulization, 4x Daily, Lennox WALTON Sedar, DO, 2.5 mg at 09/07/21 1553    baclofen (LIORESAL) tablet 20 mg, 20 mg, Oral, TID PRN, Nadyne Ben Sedar, DO    budesonide (PULMICORT) nebulizer suspension 500 mcg, 500 mcg, Nebulization, BID, Nadyne Ben Sedar, DO, 500 mcg at 09/07/21 4824    polyvinyl alcohol (LIQUIFILM TEARS) 1.4 % ophthalmic solution 1 drop, 1 drop, Both Eyes, BID, Lennox D Sedar, DO, 1 drop at 09/06/21 2321    cetirizine (ZYRTEC) tablet 10 mg, 10 mg, Per G Tube, Daily, Lennox D Sedar, DO, 10 mg at 09/07/21 1125    docusate (COLACE) 50 MG/5ML liquid 100 mg, 100 mg, Per G Tube, BID PRN, Nadyne Ben Sedar, DO    famotidine (PEPCID) tablet 20 mg, 20 mg, Oral, Daily, Lennox D Sedar, DO, 20 mg at 09/07/21 1125    fluticasone (FLONASE) 50 MCG/ACT nasal spray 1 spray, 1 spray, Nasal, Daily, Lennox WALTON Sedar, DO, 1 spray at 09/07/21 1128    glycopyrrolate (ROBINUL) tablet 1 mg, 1 mg, Per G Tube, TID, Lennox Browning DO, 1 mg at 09/07/21 1620    hydrocortisone 1 % cream, , Topical, BID, Tyson Yoo Sedar, DO, Given at 09/07/21 1129    lisinopril (PRINIVIL;ZESTRIL) tablet 2.5 mg, 2.5 mg, Oral, Daily, Norleen Naila Sedar, DO, 2.5 mg at 09/07/21 1124    montelukast (SINGULAIR) tablet 10 mg, 10 mg, Per G Tube, Daily, Lennox Browning DO, 10 mg at 09/07/21 1126    mirtazapine (REMERON) tablet 15 mg, 15 mg, Oral, Nightly, Manjula Baptiste MD, 15 mg at 09/06/21 2321    prazosin (MINIPRESS) capsule 2 mg, 2 mg, Oral, Nightly, Lennox Browning DO, 2 mg at 09/06/21 2316    risperiDONE (RISPERDAL) tablet 2 mg, 2 mg, Oral, Nightly, Manjula Baptiste MD, 2 mg at 09/06/21 2315    traZODone (DESYREL) tablet 100 mg, 100 mg, Oral, Nightly, Lennox Browning DO, 100 mg at 09/06/21 2316    mesalamine (ROWASA) enema 4 g, 4 g, Rectal, Q72H, Shahnaz Flores MD, 4 g at 09/07/21 0533    FLUoxetine (PROZAC) 20 MG/5ML solution 40 mg, 40 mg, Oral, Daily, Manjula Baptiste MD, 40 mg at 09/07/21 1127    sodium chloride flush 0.9 % injection 5-40 mL, 5-40 mL, IntraVENous, 2 times per day, Jorge Mendoza MD, 10 mL at 09/03/21 2252    sodium chloride flush 0.9 % injection 5-40 mL, 5-40 mL, IntraVENous, PRN, Jorge Mendoza MD    0.9 % sodium chloride infusion, 25 mL, IntraVENous, PRN, Jorge Mendoza MD    enoxaparin (LOVENOX) injection 40 mg, 40 mg, SubCUTAneous, Daily, Jorge Mendoza MD    polyethylene glycol (GLYCOLAX) packet 17 g, 17 g, Oral, Daily PRN, Jorge Mendoza MD    acetaminophen (TYLENOL) tablet 650 mg, 650 mg, Oral, Q6H PRN, 650 mg at 09/06/21 1041 **OR** acetaminophen (TYLENOL) suppository 650 mg, 650 mg, Rectal, Q6H PRN, Jorge Mendoza MD      Examination:  /77   Pulse 79   Temp 98.2 °F (36.8 °C) (Axillary)   Resp 18   Ht 4' 9\" (1.448 m)   Wt 74 lb (33.6 kg)   LMP  (LMP Unknown)   SpO2 100%   BMI 16.01 kg/m²   Gait - in bed  Medication side effects(SE): no    Mental Status Examination:    Level of consciousness:  within normal limits   Appearance:  poor grooming and fair hygiene  Behavior/Motor:  no abnormalities noted  Attitude toward examiner:  cooperative  Speech:  aphasic   Mood: sad  Affect:  anxious  Thought processes: Thought content:  Suicidal Ideation:  denies suicidal ideation  Cognition:     Concentration intact  Insight fair   Judgement fair     ASSESSMENT:   Patient symptoms are:  [] Well controlled  [] Improving  [] Worsening  [] No change      Diagnosis:   Principal Problem:    Major depressive disorder, recurrent, unspecified ([facility]  Active Problems:    Suicidal ideation  Resolved Problems:    * No resolved hospital problems. *      LABS:    Recent Labs     09/06/21  1141 09/07/21  0511   WBC 5.5 5.5   HGB 14.7 15.2    273     Recent Labs     09/06/21  1141 09/07/21  0511    137   K 4.0 4.3   CL 99 99   CO2 27 26   BUN 15 16   CREATININE 0.55 0.62   GLUCOSE 105* 72     No results for input(s): BILITOT, ALKPHOS, AST, ALT in the last 72 hours. Lab Results   Component Value Date    ETOH <10 09/03/2021     Lab Results   Component Value Date    TSH 1.400 09/03/2021     No results found for: LITHIUM  No results found for: VALPROATE, CBMZ    RISK ASSESSMENT: denies active SI    Treatment Plan:  Reviewed current Medications with the patient. Risks, benefits, side effects, drug-to-drug interactions and alternatives to treatment were discussed.   Pt not meeting criteria for IP admit to psych unit  Will be doing better at her [facility], with OP follow up  Medication reviewed and agree with the current treatment          Electronically signed by Kusum Rodgers MD on 9/7/2021 at 4:30 PM

## 2021-09-08 LAB
EKG ATRIAL RATE: 80 BPM
EKG P AXIS: 54 DEGREES
EKG P-R INTERVAL: 166 MS
EKG Q-T INTERVAL: 380 MS
EKG QRS DURATION: 72 MS
EKG QTC CALCULATION (BAZETT): 438 MS
EKG R AXIS: 51 DEGREES
EKG T AXIS: 44 DEGREES
EKG VENTRICULAR RATE: 80 BPM

## 2021-09-10 LAB
C. TRACHOMATIS DNA ,URINE: NEGATIVE
N. GONORRHOEAE DNA, URINE: NEGATIVE
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
## 2022-07-11 RX ORDER — RISPERIDONE 2 MG/1
TABLET, FILM COATED ORAL
Qty: 31 TABLET | Refills: 11 | OUTPATIENT
Start: 2022-07-11

## 2022-08-14 LAB — PROCALCITONIN: 0.13 NG/ML

## 2023-01-18 ENCOUNTER — PREP FOR PROCEDURE (OUTPATIENT)
Dept: SURGERY | Age: 38
End: 2023-01-18

## 2023-01-26 ENCOUNTER — ANESTHESIA EVENT (OUTPATIENT)
Dept: OPERATING ROOM | Age: 38
End: 2023-01-26
Payer: MEDICAID

## 2023-01-26 ENCOUNTER — ANESTHESIA (OUTPATIENT)
Dept: OPERATING ROOM | Age: 38
End: 2023-01-26
Payer: MEDICAID

## 2023-01-26 ENCOUNTER — HOSPITAL ENCOUNTER (OUTPATIENT)
Age: 38
Setting detail: OUTPATIENT SURGERY
Discharge: HOME | End: 2023-01-26
Attending: COLON & RECTAL SURGERY | Admitting: COLON & RECTAL SURGERY
Payer: MEDICAID

## 2023-01-26 VITALS
DIASTOLIC BLOOD PRESSURE: 64 MMHG | OXYGEN SATURATION: 94 % | SYSTOLIC BLOOD PRESSURE: 100 MMHG | HEART RATE: 82 BPM | HEIGHT: 60 IN | RESPIRATION RATE: 20 BRPM | BODY MASS INDEX: 15.71 KG/M2 | TEMPERATURE: 97.4 F | WEIGHT: 80 LBS

## 2023-01-26 PROBLEM — K94.29: Status: ACTIVE | Noted: 2023-01-26

## 2023-01-26 PROBLEM — K31.89: Status: ACTIVE | Noted: 2023-01-26

## 2023-01-26 PROCEDURE — 3700000000 HC ANESTHESIA ATTENDED CARE: Performed by: COLON & RECTAL SURGERY

## 2023-01-26 PROCEDURE — 43246 EGD PLACE GASTROSTOMY TUBE: CPT | Performed by: COLON & RECTAL SURGERY

## 2023-01-26 PROCEDURE — 7100000001 HC PACU RECOVERY - ADDTL 15 MIN: Performed by: COLON & RECTAL SURGERY

## 2023-01-26 PROCEDURE — 7100000010 HC PHASE II RECOVERY - FIRST 15 MIN: Performed by: COLON & RECTAL SURGERY

## 2023-01-26 PROCEDURE — 2500000003 HC RX 250 WO HCPCS: Performed by: NURSE ANESTHETIST, CERTIFIED REGISTERED

## 2023-01-26 PROCEDURE — 6360000002 HC RX W HCPCS: Performed by: NURSE ANESTHETIST, CERTIFIED REGISTERED

## 2023-01-26 PROCEDURE — 3700000001 HC ADD 15 MINUTES (ANESTHESIA): Performed by: COLON & RECTAL SURGERY

## 2023-01-26 PROCEDURE — 2580000003 HC RX 258: Performed by: COLON & RECTAL SURGERY

## 2023-01-26 PROCEDURE — 3609013300 HC EGD TUBE PLACEMENT: Performed by: COLON & RECTAL SURGERY

## 2023-01-26 PROCEDURE — 7100000000 HC PACU RECOVERY - FIRST 15 MIN: Performed by: COLON & RECTAL SURGERY

## 2023-01-26 PROCEDURE — 2709999900 HC NON-CHARGEABLE SUPPLY: Performed by: COLON & RECTAL SURGERY

## 2023-01-26 RX ORDER — SODIUM CHLORIDE, SODIUM LACTATE, POTASSIUM CHLORIDE, CALCIUM CHLORIDE 600; 310; 30; 20 MG/100ML; MG/100ML; MG/100ML; MG/100ML
INJECTION, SOLUTION INTRAVENOUS CONTINUOUS
Status: DISCONTINUED | OUTPATIENT
Start: 2023-01-26 | End: 2023-01-26 | Stop reason: HOSPADM

## 2023-01-26 RX ORDER — METOCLOPRAMIDE HYDROCHLORIDE 5 MG/ML
10 INJECTION INTRAMUSCULAR; INTRAVENOUS
Status: DISCONTINUED | OUTPATIENT
Start: 2023-01-26 | End: 2023-01-26 | Stop reason: HOSPADM

## 2023-01-26 RX ORDER — SODIUM CHLORIDE 9 MG/ML
25 INJECTION, SOLUTION INTRAVENOUS PRN
Status: DISCONTINUED | OUTPATIENT
Start: 2023-01-26 | End: 2023-01-26 | Stop reason: HOSPADM

## 2023-01-26 RX ORDER — HYDRALAZINE HYDROCHLORIDE 20 MG/ML
10 INJECTION INTRAMUSCULAR; INTRAVENOUS
Status: DISCONTINUED | OUTPATIENT
Start: 2023-01-26 | End: 2023-01-26 | Stop reason: HOSPADM

## 2023-01-26 RX ORDER — MAGNESIUM HYDROXIDE 1200 MG/15ML
LIQUID ORAL PRN
Status: DISCONTINUED | OUTPATIENT
Start: 2023-01-26 | End: 2023-01-26 | Stop reason: ALTCHOICE

## 2023-01-26 RX ORDER — DEXTROSE MONOHYDRATE 100 MG/ML
INJECTION, SOLUTION INTRAVENOUS CONTINUOUS PRN
Status: DISCONTINUED | OUTPATIENT
Start: 2023-01-26 | End: 2023-01-26 | Stop reason: HOSPADM

## 2023-01-26 RX ORDER — SODIUM CHLORIDE 0.9 % (FLUSH) 0.9 %
5-40 SYRINGE (ML) INJECTION EVERY 12 HOURS SCHEDULED
Status: DISCONTINUED | OUTPATIENT
Start: 2023-01-26 | End: 2023-01-26 | Stop reason: HOSPADM

## 2023-01-26 RX ORDER — LIDOCAINE HYDROCHLORIDE 20 MG/ML
INJECTION, SOLUTION EPIDURAL; INFILTRATION; INTRACAUDAL; PERINEURAL PRN
Status: DISCONTINUED | OUTPATIENT
Start: 2023-01-26 | End: 2023-01-26 | Stop reason: SDUPTHER

## 2023-01-26 RX ORDER — SODIUM CHLORIDE 0.9 % (FLUSH) 0.9 %
5-40 SYRINGE (ML) INJECTION PRN
Status: DISCONTINUED | OUTPATIENT
Start: 2023-01-26 | End: 2023-01-26 | Stop reason: HOSPADM

## 2023-01-26 RX ORDER — LABETALOL HYDROCHLORIDE 5 MG/ML
10 INJECTION, SOLUTION INTRAVENOUS
Status: DISCONTINUED | OUTPATIENT
Start: 2023-01-26 | End: 2023-01-26 | Stop reason: HOSPADM

## 2023-01-26 RX ORDER — DEXMEDETOMIDINE HYDROCHLORIDE 100 UG/ML
INJECTION, SOLUTION INTRAVENOUS PRN
Status: DISCONTINUED | OUTPATIENT
Start: 2023-01-26 | End: 2023-01-26 | Stop reason: SDUPTHER

## 2023-01-26 RX ORDER — IPRATROPIUM BROMIDE AND ALBUTEROL SULFATE 2.5; .5 MG/3ML; MG/3ML
1 SOLUTION RESPIRATORY (INHALATION)
Status: DISCONTINUED | OUTPATIENT
Start: 2023-01-26 | End: 2023-01-26 | Stop reason: HOSPADM

## 2023-01-26 RX ORDER — PROPOFOL 10 MG/ML
INJECTION, EMULSION INTRAVENOUS PRN
Status: DISCONTINUED | OUTPATIENT
Start: 2023-01-26 | End: 2023-01-26 | Stop reason: SDUPTHER

## 2023-01-26 RX ORDER — FENTANYL CITRATE 0.05 MG/ML
25 INJECTION, SOLUTION INTRAMUSCULAR; INTRAVENOUS EVERY 5 MIN PRN
Status: DISCONTINUED | OUTPATIENT
Start: 2023-01-26 | End: 2023-01-26 | Stop reason: HOSPADM

## 2023-01-26 RX ADMIN — PROPOFOL 250 MCG/KG/MIN: 10 INJECTION, EMULSION INTRAVENOUS at 10:06

## 2023-01-26 RX ADMIN — PROPOFOL 80 MG: 10 INJECTION, EMULSION INTRAVENOUS at 10:05

## 2023-01-26 RX ADMIN — DEXMEDETOMIDINE HCL 10 MCG: 100 INJECTION INTRAVENOUS at 09:55

## 2023-01-26 RX ADMIN — DEXMEDETOMIDINE HCL 10 MCG: 100 INJECTION INTRAVENOUS at 10:11

## 2023-01-26 RX ADMIN — LIDOCAINE HYDROCHLORIDE 50 MG: 20 INJECTION, SOLUTION EPIDURAL; INFILTRATION; INTRACAUDAL; PERINEURAL at 10:05

## 2023-01-26 ASSESSMENT — PAIN SCALES - GENERAL: PAINLEVEL_OUTOF10: 0

## 2023-01-26 ASSESSMENT — PAIN - FUNCTIONAL ASSESSMENT: PAIN_FUNCTIONAL_ASSESSMENT: 0-10

## 2023-01-26 NOTE — ANESTHESIA POSTPROCEDURE EVALUATION
Department of Anesthesiology  Postprocedure Note    Patient: Calvin Swan  MRN: [id number]  YOB: 1985  Date of evaluation: 1/26/2023      Procedure Summary     Date: 01/26/23 Room / Location: [address]    Anesthesia Start: 9237 Anesthesia Stop: 1402    Procedure: EGD ESOPHAGOGASTRODUODENOSCOPY PEG TUBE INSERTION (Abdomen) Diagnosis:       Gastrostomy complication ([address]      (Gastrostomy complication ([address] [B10.84])    Surgeons: Laila Yeager MD Responsible Provider: Anusha Atkinson MD    Anesthesia Type: MAC ASA Status: 4          Anesthesia Type: No value filed.     Mili Phase I: Mili Score: 10    Mlii Phase II:        Anesthesia Post Evaluation    Patient location during evaluation: bedside  Patient participation: waiting for patient participation  Level of consciousness: awake  Airway patency: patent  Nausea & Vomiting: no nausea and no vomiting  Complications: no  Cardiovascular status: blood pressure returned to baseline and hemodynamically stable  Respiratory status: acceptable  Hydration status: euvolemic

## 2023-01-26 NOTE — H&P
Department of General Surgery - Adult  Surgical Service general surgery  Attending admit note        CHIEF COMPLAINT: Gastrostomy complication    History Obtained From:  patient, electronic medical record, caregiver    HISTORY OF PRESENT ILLNESS:                The patient is a 42 y.o. female who presents with gastrostomy complication secondary to having it pulled out. She relies on her gastrostomy tube for tube feeding. She has a tracheostomy. The tube that replaced her previous gastrostomy site has been leaking and is small caliber. I have asked her to come to the outpatient area for replacement of her gastrostomy tube. Under sedation bumper gastrostomy tube can be placed that avoid leaking and can be used as a durable gastrostomy device. Risks and benefits explained to caregiver. Consent obtained. Past Medical History:        Diagnosis Date    Bronchitis     Cerebral palsy (HCC)     CHF (congestive heart failure) (HCC)     Dysphagia     GERD (gastroesophageal reflux disease)     Hypokalemia      Past Surgical History:        Procedure Laterality Date    TRACHEOSTOMY       Current Medications:   Current Facility-Administered Medications: lactated ringers IV soln infusion, , IntraVENous, Continuous  Allergies:  Amoxicillin-pot clavulanate, Ibuprofen, Penicillins, Ondansetron, Vancomycin, Amoxicillin, Eggs-apples-oats [alimentum], Food, Milk-related compounds, Orange juice [orange oil], and Strawberry (diagnostic)    Social History:   TOBACCO:   reports that she has never smoked. She has never used smokeless tobacco.  ETOH:   has no history on file for alcohol use. DRUGS:   has no history on file for drug use. Family History:   History reviewed. No pertinent family history.     REVIEW OF SYSTEMS:    CONSTITUTIONAL:  negative  RESPIRATORY:  negative  CARDIOVASCULAR:  negative  GASTROINTESTINAL:  negative for abdominal pain    PHYSICAL EXAM:    VITALS:  /77   Pulse 80   Temp 97.4 °F (36.3 °C) (Temporal)   Resp 20   Ht 5' (1.524 m)   Wt 80 lb (36.3 kg)   LMP 01/12/2023   SpO2 96%   BMI 15.62 kg/m²   CONSTITUTIONAL: Awake alert and communicative  EYES:  Lids and lashes normal, pupils equal, round and reactive to light, extra ocular muscles intact, sclera clear, conjunctiva normal  ENT: Chronic tracheostomy in place  NECK:  Supple, symmetrical, trachea midline, no adenopathy, thyroid symmetric, not enlarged and no tenderness, skin normal  HEMATOLOGIC/LYMPHATICS:  no cervical lymphadenopathy  LUNGS:  No increased work of breathing, good air exchange, clear to auscultation bilaterally, no crackles or wheezing  CARDIOVASCULAR:  Normal apical impulse, regular rate and rhythm, normal S1 and S2, no S3 or S4, and no murmur noted  ABDOMEN: Soft nontender, gastrostomy small caliber tube in place with leakage and skin changes  MUSCULOSKELETAL:  There is no redness, warmth, or swelling of the joints. Full range of motion noted. Motor strength is 5 out of 5 all extremities bilaterally. Tone is normal.  NEUROLOGIC: Awake alert and communicative  SKIN:  no bruising or bleeding      IMPRESSION/RECOMMENDATIONS:      Dysfunctional gastrostomy tube    Risks and benefits of EGD and replacement described to caregiver and consent obtained.

## 2023-01-26 NOTE — DISCHARGE INSTRUCTIONS
May use PEG tube immediately for medications and tube feeds    Abdominal binder to prevent any manual traction

## 2023-01-26 NOTE — PROGRESS NOTES
Dc instructions given to caregivers, verbalized understanding, abd binder on pt, pt stable, iv dc'd and pt getting dressed by caregivers

## 2023-01-26 NOTE — ANESTHESIA PRE PROCEDURE
Department of Anesthesiology  Preprocedure Note       Name:  Leilani Fuentes   Age:  42 y.o.  :  1985                                          MRN:  [id number]         Date:  2023      Surgeon: Di Olivier):  Eugenia Herring MD    Procedure: Procedure(s):  EGD ESOPHAGOGASTRODUODENOSCOPY PEG TUBE INSERTION, PAT ON ADMIT, PATIENT AT RESCARE LAGRANGE    Medications prior to admission:   Prior to Admission medications    Medication Sig Start Date End Date Taking?  Authorizing Provider   FLUoxetine (PROZAC) 20 MG/5ML solution Take 10 mLs by mouth daily 21   Jimy Mendoza,    risperiDONE (RISPERDAL) 2 MG tablet Take 1 tablet by mouth nightly 21   Jimy Mendoza,    diphenhydrAMINE (BENYLIN) 12.5 MG/5ML liquid 12.5 mg by Per G Tube route nightly    Historical Provider, MD   hydrocortisone 1 % cream Apply topically 2 times daily Apply topically 2 times daily to rectal irritation    Historical Provider, MD   nystatin (MYCOSTATIN) 630570 UNIT/GM powder Apply topically as needed Apply to G-tube site as directed    Historical Provider, MD   albuterol (PROVENTIL) (2.5 MG/3ML) 0.083% nebulizer solution Take 2.5 mg by nebulization 4 times daily    Historical Provider, MD   budesonide (PULMICORT) 0.5 MG/2ML nebulizer suspension Take 1 ampule by nebulization 2 times daily    Historical Provider, MD   cetirizine (ZYRTEC) 10 MG tablet 10 mg by Per G Tube route daily    Historical Provider, MD   ipratropium (ATROVENT) 0.02 % nebulizer solution Take 0.5 mg by nebulization 4 times daily    Historical Provider, MD   metoclopramide (REGLAN) 10 MG tablet 10 mg by Per G Tube route three times daily    Historical Provider, MD   Cholecalciferol (VITAMIN D3) 50 MCG (2000) TABS 2 tablets by Per G Tube route daily    Historical Provider, MD   polyethylene glycol (GLYCOLAX) 17 GM/SCOOP powder 17 g by Per G Tube route 2 times daily    Historical Provider, MD   lisinopril (PRINIVIL;ZESTRIL) 2.5 MG tablet Take 2.5 mg by mouth daily 9/9/20   Historical Provider, MD   prazosin (MINIPRESS) 2 MG capsule Take 2 mg by mouth nightly 3/9/21   Historical Provider, MD   mirtazapine (REMERON) 15 MG tablet Take 15 mg by mouth nightly 3/9/21   Historical Provider, MD   traZODone (DESYREL) 50 MG tablet Take 100 mg by mouth nightly 3/9/21   Historical Provider, MD   famotidine (PEPCID) 40 MG tablet TAKE 1 TABLET BY MOUTH TWICE A DAY 30 MINUTES BEFORE BREAKFAST AND DINNER 9/9/20   Historical Provider, MD   fluticasone (FLONASE) 50 MCG/ACT nasal spray 1 spray by Nasal route daily 9/9/20   Historical Provider, MD   ketoconazole (NIZORAL) 2 % cream Apply topically 2 times daily 5/24/21   Historical Provider, MD   potassium chloride 20 MEQ/15ML (10%) solution Take 15 mLs by mouth daily    Historical Provider, MD   carboxymethylcellulose 1 % ophthalmic solution Place 1 drop into both eyes 2 times daily    Historical Provider, MD   clindamycin (CLEOCIN T) 1 % lotion Apply topically 2 times daily Indications: Acne Flare Apply topically to face 2 times daily. Historical Provider, MD   mesalamine (CANASA) 1000 MG suppository Place 1,000 mg rectally every 72 hours    Historical Provider, MD   montelukast (SINGULAIR) 10 MG tablet 10 mg by Per G Tube route daily    Historical Provider, MD   glycopyrrolate (CUVPOSA) 1 MG/5ML SOLN solution 1 mg by Per G Tube route 3 times daily     Historical Provider, MD   docusate (COLACE) 50 MG/5ML liquid 100 mg by Per G Tube route 2 times daily    Historical Provider, MD   Multiple Vitamins-Minerals (MULTIVITAMIN) LIQD by Gastric Tube route daily As per directions    Historical Provider, MD       Current medications:    Current Facility-Administered Medications   Medication Dose Route Frequency Provider Last Rate Last Admin    lactated ringers IV soln infusion   IntraVENous Continuous Maria Del Rosario Coello MD           Allergies:     Allergies   Allergen Reactions    Amoxicillin-Pot Clavulanate      Other reaction(s): Unknown    Ibuprofen      Other reaction(s): Unknown, Unknown    Penicillins Hives     Other reaction(s): Unknown    Ondansetron Rash     blotchy arrythmia noted to right arm after giving IV zofran 4/24/2015  blotchy arrythmia noted to right arm after giving IV zofran 4/24/2015      Vancomycin Rash    Amoxicillin     Eggs-Apples-Oats [Alimentum]     Food Other (See Comments)    Milk-Related Compounds     Orange Juice [Orange Oil]     Strawberry (Diagnostic)        Problem List:    Patient Active Problem List   Diagnosis Code    Acute blood loss anemia D62    Allergy status to penicillin Z88.0    Constipation K59.00    Dependence on wheelchair Z99.3    DVT prophylaxis Z29.9    Dysphagia R13.10    Exotropia of right eye H50.10    Foot callus L84    Gastrostomy status ([facility]) Z93.1    GERD (gastroesophageal reflux disease) K21.9    GI bleeding K92.2    Infantile cerebral palsy ([facility]) G80.9    Intellectual disability F79    Major depressive disorder, single episode, unspecified F32.9    Malnutrition of mild degree ([facility]) E44.1    MDRO (multiple drug resistant organisms) resistance [id number]    Mood disorder ([facility]) F39    Nausea and vomiting R11.2    Obstructive sleep apnea (adult) (pediatric) G47.33    Onychomycosis B35.1    PEG tube malfunction ([facility]) K94.23    Pancreatitis K85.90    Personal history of other diseases of the digestive system Z87.19    Personal history of urinary (tract) infections Z87.440    Intentional self-harm by unspecified sharp object, initial encounter ([facility] [address] X78. 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    Suicidal ideations R45.851    Tracheostomy dependent ([address] Z93.0    Tracheostomy status ([address] Z93.0    Underweight R63.6    Unintentional weight loss R63.4    Unspecified hemorrhoids K64.9    Suicidal ideation R45.851    Major depressive disorder, recurrent, unspecified ([address] F33.9       Past Medical History:        Diagnosis Date    Bronchitis     Cerebral palsy (HCC)     CHF (congestive heart failure) (HCC)     Dysphagia     GERD (gastroesophageal reflux disease)     Hypokalemia        Past Surgical History:        Procedure Laterality Date    TRACHEOSTOMY         Social History:    Social History     Tobacco Use    Smoking status: Never    Smokeless tobacco: Never   Substance Use Topics    Alcohol use: Not on file                                Counseling given: Not Answered      Vital Signs (Current):   Vitals:    01/26/23 0825   BP: 119/77   Pulse: 80   Resp: 20   Temp: 97.4 °F (36.3 °C)   TempSrc: Temporal   SpO2: 96%   Weight: 80 lb (36.3 kg)   Height: 5' (1.524 m)                                              BP Readings from Last 3 Encounters:   01/26/23 119/77   09/07/21 119/77   08/30/21 110/70       NPO Status:                                                                                 BMI:   Wt Readings from Last 3 Encounters:   01/26/23 80 lb (36.3 kg)   09/04/21 74 lb (33.6 kg)     Body mass index is 15.62 kg/m².     CBC:   Lab Results   Component Value Date/Time    WBC 5.5 09/07/2021 05:11 AM    RBC 4.75 09/07/2021 05:11 AM    HGB 15.2 09/07/2021 05:11 AM    HCT 44.2 09/07/2021 05:11 AM    MCV 93.1 09/07/2021 05:11 AM    RDW 13.1 09/07/2021 05:11 AM     09/07/2021 05:11 AM       CMP:   Lab Results   Component Value Date/Time     09/07/2021 05:11 AM    K 4.3 09/07/2021 05:11 AM    CL 99 09/07/2021 05:11 AM    CO2 26 09/07/2021 05:11 AM    BUN 16 09/07/2021 05:11 AM    CREATININE 0.62 09/07/2021 05:11 AM    GFRAA >60.0 09/07/2021 05:11 AM    LABGLOM >60.0 09/07/2021 05:11 AM    GLUCOSE 72 09/07/2021 05:11 AM    PROT 6.8 09/03/2021 03:28 PM    CALCIUM 9.1 09/07/2021 05:11 AM    BILITOT 0.3 09/03/2021 03:28 PM    ALKPHOS 88 09/03/2021 03:28 PM    AST 28 09/03/2021 03:28 PM    ALT 14 09/03/2021 03:28 PM       POC Tests: No results for input(s): POCGLU, POCNA, POCK, POCCL, POCBUN, POCHEMO, POCHCT in the last 72 hours.    Coags: No results found for: PROTIME, INR, APTT    HCG (If Applicable): No results found for: PREGTESTUR, PREGSERUM, HCG, HCGQUANT     ABGs: No results found for: PHART, PO2ART, SMM6JTI, YMT6MEN, BEART, Q4YJZWHS     Type & Screen (If Applicable):  No results found for: LABABO, LABRH    Drug/Infectious Status (If Applicable):  No results found for: HIV, HEPCAB    COVID-19 Screening (If Applicable):   Lab Results   Component Value Date/Time    COVID19 Not Detected 09/03/2021 02:49 PM           Anesthesia Evaluation  Patient summary reviewed and Nursing notes reviewed no history of anesthetic complications:   Airway: Mallampati: II  TM distance: >3 FB   Neck ROM: full  Mouth opening: > = 3 FB  Tracheostomy present Dental: normal exam         Pulmonary:Negative Pulmonary ROS and normal exam    (+) sleep apnea:                             Cardiovascular:Negative CV ROS  Exercise tolerance: good (>4 METS),   (+) CHF:,       ECG reviewed               Beta Blocker:  Not on Beta Blocker         Neuro/Psych:   Negative Neuro/Psych ROS  (+) psychiatric history:            GI/Hepatic/Renal: Neg GI/Hepatic/Renal ROS  (+) GERD:,           Endo/Other: Negative Endo/Other ROS             Pt had PAT visit. Abdominal:             Vascular: negative vascular ROS. Other Findings:           Anesthesia Plan      MAC     ASA 4       Induction: intravenous. MIPS: Prophylactic antiemetics administered. Anesthetic plan and risks discussed with patient. Plan discussed with CRNA.     Attending anesthesiologist reviewed and agrees with Pre Eval content                Markus Painting MD   1/26/2023

## 2023-03-29 LAB
ALANINE AMINOTRANSFERASE (SGPT) (U/L) IN SER/PLAS: 29 U/L (ref 7–45)
ALBUMIN (G/DL) IN SER/PLAS: 3.5 G/DL (ref 3.4–5)
ALKALINE PHOSPHATASE (U/L) IN SER/PLAS: 76 U/L (ref 33–110)
ANION GAP IN SER/PLAS: 13 MMOL/L (ref 10–20)
ASPARTATE AMINOTRANSFERASE (SGOT) (U/L) IN SER/PLAS: 31 U/L (ref 9–39)
BILIRUBIN TOTAL (MG/DL) IN SER/PLAS: 0.2 MG/DL (ref 0–1.2)
CALCIUM (MG/DL) IN SER/PLAS: 9.4 MG/DL (ref 8.6–10.3)
CARBON DIOXIDE, TOTAL (MMOL/L) IN SER/PLAS: 28 MMOL/L (ref 21–32)
CHLORIDE (MMOL/L) IN SER/PLAS: 101 MMOL/L (ref 98–107)
CREATININE (MG/DL) IN SER/PLAS: 0.53 MG/DL (ref 0.5–1.05)
ERYTHROCYTE DISTRIBUTION WIDTH (RATIO) BY AUTOMATED COUNT: 14.3 % (ref 11.5–14.5)
ERYTHROCYTE MEAN CORPUSCULAR HEMOGLOBIN CONCENTRATION (G/DL) BY AUTOMATED: 30.7 G/DL (ref 32–36)
ERYTHROCYTE MEAN CORPUSCULAR VOLUME (FL) BY AUTOMATED COUNT: 97 FL (ref 80–100)
ERYTHROCYTES (10*6/UL) IN BLOOD BY AUTOMATED COUNT: 4.4 X10E12/L (ref 4–5.2)
GFR FEMALE: >90 ML/MIN/1.73M2
GLUCOSE (MG/DL) IN SER/PLAS: 81 MG/DL (ref 74–99)
HEMATOCRIT (%) IN BLOOD BY AUTOMATED COUNT: 42.7 % (ref 36–46)
HEMOGLOBIN (G/DL) IN BLOOD: 13.1 G/DL (ref 12–16)
LEUKOCYTES (10*3/UL) IN BLOOD BY AUTOMATED COUNT: 14.5 X10E9/L (ref 4.4–11.3)
PLATELETS (10*3/UL) IN BLOOD AUTOMATED COUNT: 345 X10E9/L (ref 150–450)
POTASSIUM (MMOL/L) IN SER/PLAS: 4.1 MMOL/L (ref 3.5–5.3)
PROTEIN TOTAL: 6.7 G/DL (ref 6.4–8.2)
SODIUM (MMOL/L) IN SER/PLAS: 138 MMOL/L (ref 136–145)
UREA NITROGEN (MG/DL) IN SER/PLAS: 17 MG/DL (ref 6–23)

## 2023-07-18 ENCOUNTER — HOSPITAL ENCOUNTER (OUTPATIENT)
Dept: WOMENS IMAGING | Age: 38
Discharge: HOME | End: 2023-07-20
Payer: MEDICAID

## 2023-07-18 DIAGNOSIS — Z12.31 ENCOUNTER FOR SCREENING MAMMOGRAM FOR MALIGNANT NEOPLASM OF BREAST: ICD-10-CM

## 2023-07-18 PROCEDURE — 77067 SCR MAMMO BI INCL CAD: CPT

## 2024-02-20 ENCOUNTER — PREP FOR PROCEDURE (OUTPATIENT)
Dept: SURGERY | Age: 39
End: 2024-02-20

## 2024-02-20 RX ORDER — SODIUM CHLORIDE 0.9 % (FLUSH) 0.9 %
5-40 SYRINGE (ML) INJECTION EVERY 12 HOURS SCHEDULED
Status: CANCELLED | OUTPATIENT
Start: 2024-02-29

## 2024-02-20 RX ORDER — SODIUM CHLORIDE 9 MG/ML
INJECTION, SOLUTION INTRAVENOUS PRN
Status: CANCELLED | OUTPATIENT
Start: 2024-02-29

## 2024-02-20 RX ORDER — SODIUM CHLORIDE 0.9 % (FLUSH) 0.9 %
5-40 SYRINGE (ML) INJECTION PRN
Status: CANCELLED | OUTPATIENT
Start: 2024-02-29

## 2024-02-28 ENCOUNTER — ANESTHESIA EVENT (OUTPATIENT)
Dept: OPERATING ROOM | Age: 39
End: 2024-02-28
Payer: MEDICAID

## 2024-02-28 ASSESSMENT — LIFESTYLE VARIABLES: SMOKING_STATUS: 0

## 2024-02-28 NOTE — ANESTHESIA PRE PROCEDURE
Department of Anesthesiology  Preprocedure Note       Name:  Edgar Sanchez   Age:  38 y.o.  :  1985                                          MRN:  [id number]         Date:  2024      Surgeon: Surgeon(s):  Carlos Jay MD    Procedure: Procedure(s):  EGD ESOPHAGOGASTRODUODENOSCOPY PEG TUBE REPLACEMENT    Medications prior to admission:   Prior to Admission medications    Medication Sig Start Date End Date Taking? Authorizing Provider   clonazePAM (KLONOPIN) 0.5 MG tablet Take 1 tablet by mouth in the morning and 1 tablet in the evening.    Dom Sherman MD   cimetidine (TAGAMET) 400 MG tablet  23   Dom Sherman MD   guaiFENesin (GAMAL-TUSSIN) 100 MG/5ML liquid  23   Dom Sherman MD   OLANZapine (ZYPREXA) 2.5 MG tablet Take 2.5 tablets by mouth 2 times daily 23   Dom Sherman MD   omeprazole (PRILOSEC) 20 MG delayed release capsule  22   Dom Sherman MD   medroxyPROGESTERone (DEPO-PROVERA) 150 MG/ML injection Inject 150 mg into the muscle    Dom Sherman MD   acetylcysteine 600 MG CAPS Take 600 mg by mouth 2 times daily    Dom Sherman MD   metoprolol tartrate (LOPRESSOR) 25 MG tablet Take 0.5 tablets by mouth 2 times daily    Dom Sherman MD   hydrOXYzine (ATARAX) 10 MG/5ML syrup 5 mLs by Per G Tube route 3 times daily    Dom Sherman MD   FLUoxetine (PROZAC) 20 MG/5ML solution Take 10 mLs by mouth daily 21   Charlotte Louis DO   risperiDONE (RISPERDAL) 2 MG tablet Take 1 tablet by mouth nightly 21   Charlotte Louis DO   diphenhydrAMINE (BENYLIN) 12.5 MG/5ML liquid 12.5 mg by Per G Tube route nightly    Dom Sherman MD   hydrocortisone 1 % cream Apply topically 2 times daily Apply topically 2 times daily to rectal irritation    Dom Sherman MD   nystatin (MYCOSTATIN) 900916 UNIT/GM powder Apply topically as needed Apply to G-tube site as directed    Provider

## 2024-02-29 ENCOUNTER — HOSPITAL ENCOUNTER (OUTPATIENT)
Age: 39
Setting detail: OUTPATIENT SURGERY
Discharge: HOME | End: 2024-02-29
Attending: COLON & RECTAL SURGERY | Admitting: COLON & RECTAL SURGERY
Payer: MEDICAID

## 2024-02-29 ENCOUNTER — ANESTHESIA (OUTPATIENT)
Dept: OPERATING ROOM | Age: 39
End: 2024-02-29
Payer: MEDICAID

## 2024-02-29 VITALS
SYSTOLIC BLOOD PRESSURE: 131 MMHG | TEMPERATURE: 97.8 F | OXYGEN SATURATION: 97 % | DIASTOLIC BLOOD PRESSURE: 78 MMHG | HEIGHT: 60 IN | WEIGHT: 78 LBS | HEART RATE: 98 BPM | RESPIRATION RATE: 20 BRPM | BODY MASS INDEX: 15.31 KG/M2

## 2024-02-29 PROCEDURE — 2709999900 HC NON-CHARGEABLE SUPPLY: Performed by: COLON & RECTAL SURGERY

## 2024-02-29 PROCEDURE — 3700000001 HC ADD 15 MINUTES (ANESTHESIA): Performed by: COLON & RECTAL SURGERY

## 2024-02-29 PROCEDURE — 3700000000 HC ANESTHESIA ATTENDED CARE: Performed by: COLON & RECTAL SURGERY

## 2024-02-29 PROCEDURE — 6360000002 HC RX W HCPCS: Performed by: NURSE ANESTHETIST, CERTIFIED REGISTERED

## 2024-02-29 PROCEDURE — 2580000003 HC RX 258: Performed by: STUDENT IN AN ORGANIZED HEALTH CARE EDUCATION/TRAINING PROGRAM

## 2024-02-29 PROCEDURE — 2580000003 HC RX 258: Performed by: COLON & RECTAL SURGERY

## 2024-02-29 PROCEDURE — 7100000011 HC PHASE II RECOVERY - ADDTL 15 MIN: Performed by: COLON & RECTAL SURGERY

## 2024-02-29 PROCEDURE — 7100000010 HC PHASE II RECOVERY - FIRST 15 MIN: Performed by: COLON & RECTAL SURGERY

## 2024-02-29 PROCEDURE — 2720000010 HC SURG SUPPLY STERILE: Performed by: COLON & RECTAL SURGERY

## 2024-02-29 PROCEDURE — 3609013300 HC EGD TUBE PLACEMENT: Performed by: COLON & RECTAL SURGERY

## 2024-02-29 RX ORDER — SODIUM CHLORIDE 0.9 % (FLUSH) 0.9 %
5-40 SYRINGE (ML) INJECTION PRN
Status: DISCONTINUED | OUTPATIENT
Start: 2024-02-29 | End: 2024-02-29 | Stop reason: HOSPADM

## 2024-02-29 RX ORDER — LIDOCAINE HYDROCHLORIDE 10 MG/ML
1 INJECTION, SOLUTION EPIDURAL; INFILTRATION; INTRACAUDAL; PERINEURAL
Status: DISCONTINUED | OUTPATIENT
Start: 2024-02-29 | End: 2024-02-29 | Stop reason: HOSPADM

## 2024-02-29 RX ORDER — SODIUM CHLORIDE 9 MG/ML
INJECTION, SOLUTION INTRAVENOUS CONTINUOUS
Status: DISCONTINUED | OUTPATIENT
Start: 2024-02-29 | End: 2024-02-29 | Stop reason: HOSPADM

## 2024-02-29 RX ORDER — SODIUM CHLORIDE 9 MG/ML
INJECTION, SOLUTION INTRAVENOUS PRN
Status: DISCONTINUED | OUTPATIENT
Start: 2024-02-29 | End: 2024-02-29 | Stop reason: HOSPADM

## 2024-02-29 RX ORDER — LIDOCAINE HYDROCHLORIDE 20 MG/ML
INJECTION, SOLUTION INTRAVENOUS PRN
Status: DISCONTINUED | OUTPATIENT
Start: 2024-02-29 | End: 2024-02-29 | Stop reason: SDUPTHER

## 2024-02-29 RX ORDER — SODIUM CHLORIDE 0.9 % (FLUSH) 0.9 %
5-40 SYRINGE (ML) INJECTION EVERY 12 HOURS SCHEDULED
Status: DISCONTINUED | OUTPATIENT
Start: 2024-02-29 | End: 2024-02-29 | Stop reason: HOSPADM

## 2024-02-29 RX ORDER — ACETAMINOPHEN 325 MG/1
650 TABLET ORAL
Status: DISCONTINUED | OUTPATIENT
Start: 2024-02-29 | End: 2024-02-29 | Stop reason: HOSPADM

## 2024-02-29 RX ORDER — PROPOFOL 10 MG/ML
INJECTION, EMULSION INTRAVENOUS PRN
Status: DISCONTINUED | OUTPATIENT
Start: 2024-02-29 | End: 2024-02-29 | Stop reason: SDUPTHER

## 2024-02-29 RX ORDER — MAGNESIUM HYDROXIDE 1200 MG/15ML
LIQUID ORAL PRN
Status: DISCONTINUED | OUTPATIENT
Start: 2024-02-29 | End: 2024-02-29 | Stop reason: ALTCHOICE

## 2024-02-29 RX ADMIN — SODIUM CHLORIDE: 9 INJECTION, SOLUTION INTRAVENOUS at 09:05

## 2024-02-29 RX ADMIN — LIDOCAINE HYDROCHLORIDE 40 MG: 20 INJECTION, SOLUTION INTRAVENOUS at 09:09

## 2024-02-29 RX ADMIN — PROPOFOL 40 MG: 10 INJECTION, EMULSION INTRAVENOUS at 09:12

## 2024-02-29 RX ADMIN — PROPOFOL 40 MG: 10 INJECTION, EMULSION INTRAVENOUS at 09:09

## 2024-02-29 ASSESSMENT — PAIN SCALES - GENERAL
PAINLEVEL_OUTOF10: 0

## 2024-02-29 ASSESSMENT — PAIN SCALES - WONG BAKER
WONGBAKER_NUMERICALRESPONSE: 0

## 2024-02-29 ASSESSMENT — PAIN - FUNCTIONAL ASSESSMENT: PAIN_FUNCTIONAL_ASSESSMENT: WONG-BAKER FACES

## 2024-02-29 NOTE — ANESTHESIA POSTPROCEDURE EVALUATION
Department of Anesthesiology  Postprocedure Note    Patient: Edgar Sanchez  MRN: [id number]  YOB: 1985  Date of evaluation: 2/29/2024    Procedure Summary       Date: 02/29/24 Room / Location: [address]    Anesthesia Start: 0905 Anesthesia Stop:     Procedure: EGD ESOPHAGOGASTRODUODENOSCOPY PEG TUBE REPLACEMENT (Abdomen) Diagnosis:       PEG tube malfunction (HCC)      (PEG tube malfunction (HCC) [K94.23])    Surgeons: Carlos Jay MD Responsible Provider: Cierra Salomon MD    Anesthesia Type: MAC ASA Status: 3            Anesthesia Type: No value filed.    Mili Phase I: Mili Score: 10    Mili Phase II:      Anesthesia Post Evaluation    Patient location during evaluation: PACU  Patient participation: complete - patient participated  Level of consciousness: awake  Pain score: 0  Airway patency: patent  Nausea & Vomiting: no nausea and no vomiting  Cardiovascular status: blood pressure returned to baseline  Respiratory status: acceptable  Hydration status: stable  Comments: Breathimh throug trach  Pain management: adequate        No notable events documented.

## 2024-02-29 NOTE — DISCHARGE INSTRUCTIONS
You may use her PEG tube immediately for medications and tube feeds as desired      [facility]  Outpatient Discharge Instructions    To continue your care at home, please follow the instructions below and any additional discharge instructions given to you by your physician.    GENERAL ANESTHESIA:  Do not drive or operate machinery for 24hrs after discharge,  Do not drink alcohol, take tranquilizers, sleeping medication, or any other medication not directly instructed by your physician,   Do not make any important decisions or sign any legal documents for 24hrs after surgery,  Have someone with you for 24hrs after surgery to assist you as needed.    ACTIVITY:  Light activity for 24hrs,  No heavy lifting or exercise until instructed by your physician,  You may resume normal activities once instructed by your physician,  Special Instruction: ___________________________________________________________________    FLUIDS AND DIET:  An upset stomach or feeling sick (nausea) can commonly occur after surgery and/or pain medication use. To help minimize nausea:  Do not eat a heavy meal soon after your surgery,    Start with water or other clear liquids,  Advance to mild or bland items like Jell-O, dry toast, crackers, etc.,  Avoid caffeine,  Do not drink alcohol for at least 24 hours after surgery,  Your physician may prescribe anti-nausea medication if your nausea continues,  If you are free from nausea for 24hrs, you can advance to your normal diet as tolerated.    OPERATIVE SITE:  A small amount of bleeding or drainage after surgery is normal. Your physician will provide you with specific instructions on how to care for your surgical site and/or dressing.   Try not to touch your surgical site unless necessary,   Always wash your hands BEFORE and AFTER changing your dressing if instructed by your physician,   Proper handwashing includes wetting your hands with clean water, applying soap, lathering your hands by

## 2024-02-29 NOTE — H&P
status    PHYSICAL EXAM:    VITALS:  BP (!) 145/84   Pulse 95   Temp 97.8 °F (36.6 °C) (Temporal)   Resp 18   Ht 1.524 m (5')   Wt 35.4 kg (78 lb)   LMP  (LMP Unknown)   SpO2 96%   BMI 15.23 kg/m²   CONSTITUTIONAL: Awake, unresponsive nonverbal  EYES:  Lids and lashes normal, pupils equal, round and reactive to light, extra ocular muscles intact, sclera clear, conjunctiva normal  ENT:  Normocephalic, without obvious abnormality, atraumatic, sinuses nontender on palpation, external ears without lesions, oral pharynx with moist mucus membranes, tonsils without erythema or exudates, gums normal and good dentition.  NECK:  Supple, symmetrical, trachea midline, no adenopathy, thyroid symmetric, not enlarged and no tenderness, skin normal  HEMATOLOGIC/LYMPHATICS:  no cervical lymphadenopathy  BACK:  Symmetric, no curvature, spinous processes are non-tender on palpation, paraspinous muscles are non-tender on palpation, no costal vertebral tenderness  LUNGS:  No increased work of breathing, good air exchange, clear to auscultation bilaterally, no crackles or wheezing  CARDIOVASCULAR:  Normal apical impulse, regular rate and rhythm, normal S1 and S2, no S3 or S4, and no murmur noted  ABDOMEN: Mann catheter in place from previous gastrostomy tube  MUSCULOSKELETAL:  There is no redness, warmth, or swelling of the joints.  Full range of motion noted.  Motor strength is 5 out of 5 all extremities bilaterally.  Tone is normal.  NEUROLOGIC: Awake nonverbal  SKIN:  no bruising or bleeding      IMPRESSION/RECOMMENDATIONS:      EGD with replacement of bumpered gastrostomy tube    Risks and benefits explained to son and consent obtained

## 2024-03-15 ENCOUNTER — HOSPITAL ENCOUNTER (EMERGENCY)
Facility: HOSPITAL | Age: 39
Discharge: HOME | End: 2024-03-16
Attending: STUDENT IN AN ORGANIZED HEALTH CARE EDUCATION/TRAINING PROGRAM
Payer: MEDICAID

## 2024-03-15 ENCOUNTER — APPOINTMENT (OUTPATIENT)
Dept: CARDIOLOGY | Facility: HOSPITAL | Age: 39
End: 2024-03-15
Payer: MEDICAID

## 2024-03-15 VITALS
WEIGHT: 88.18 LBS | BODY MASS INDEX: 16.23 KG/M2 | OXYGEN SATURATION: 97 % | SYSTOLIC BLOOD PRESSURE: 121 MMHG | TEMPERATURE: 97.9 F | RESPIRATION RATE: 20 BRPM | HEIGHT: 62 IN | DIASTOLIC BLOOD PRESSURE: 83 MMHG | HEART RATE: 76 BPM

## 2024-03-15 DIAGNOSIS — R46.89 AGGRESSIVE BEHAVIOR: Primary | ICD-10-CM

## 2024-03-15 LAB
ALBUMIN SERPL BCP-MCNC: 3.6 G/DL (ref 3.4–5)
ALP SERPL-CCNC: 59 U/L (ref 33–110)
ALT SERPL W P-5'-P-CCNC: 19 U/L (ref 7–45)
ANION GAP SERPL CALC-SCNC: 12 MMOL/L (ref 10–20)
APAP SERPL-MCNC: <10 UG/ML
AST SERPL W P-5'-P-CCNC: 33 U/L (ref 9–39)
B-HCG SERPL-ACNC: <2 MIU/ML
BASOPHILS # BLD AUTO: 0.05 X10*3/UL (ref 0–0.1)
BASOPHILS NFR BLD AUTO: 0.4 %
BILIRUB SERPL-MCNC: 0.2 MG/DL (ref 0–1.2)
BUN SERPL-MCNC: 14 MG/DL (ref 6–23)
CALCIUM SERPL-MCNC: 8.7 MG/DL (ref 8.6–10.3)
CHLORIDE SERPL-SCNC: 105 MMOL/L (ref 98–107)
CO2 SERPL-SCNC: 25 MMOL/L (ref 21–32)
CREAT SERPL-MCNC: 0.6 MG/DL (ref 0.5–1.05)
EGFRCR SERPLBLD CKD-EPI 2021: >90 ML/MIN/1.73M*2
EOSINOPHIL # BLD AUTO: 1.11 X10*3/UL (ref 0–0.7)
EOSINOPHIL NFR BLD AUTO: 9.7 %
ERYTHROCYTE [DISTWIDTH] IN BLOOD BY AUTOMATED COUNT: 12.7 % (ref 11.5–14.5)
ETHANOL SERPL-MCNC: <10 MG/DL
GLUCOSE SERPL-MCNC: 68 MG/DL (ref 74–99)
HCT VFR BLD AUTO: 42.8 % (ref 36–46)
HGB BLD-MCNC: 14 G/DL (ref 12–16)
HOLD SPECIMEN: NORMAL
IMM GRANULOCYTES # BLD AUTO: 0.02 X10*3/UL (ref 0–0.7)
IMM GRANULOCYTES NFR BLD AUTO: 0.2 % (ref 0–0.9)
LYMPHOCYTES # BLD AUTO: 1.79 X10*3/UL (ref 1.2–4.8)
LYMPHOCYTES NFR BLD AUTO: 15.7 %
MCH RBC QN AUTO: 32.2 PG (ref 26–34)
MCHC RBC AUTO-ENTMCNC: 32.7 G/DL (ref 32–36)
MCV RBC AUTO: 98 FL (ref 80–100)
MONOCYTES # BLD AUTO: 0.8 X10*3/UL (ref 0.1–1)
MONOCYTES NFR BLD AUTO: 7 %
NEUTROPHILS # BLD AUTO: 7.63 X10*3/UL (ref 1.2–7.7)
NEUTROPHILS NFR BLD AUTO: 67 %
NRBC BLD-RTO: 0 /100 WBCS (ref 0–0)
PLATELET # BLD AUTO: 273 X10*3/UL (ref 150–450)
POTASSIUM SERPL-SCNC: 3.8 MMOL/L (ref 3.5–5.3)
PROT SERPL-MCNC: 6.6 G/DL (ref 6.4–8.2)
RBC # BLD AUTO: 4.35 X10*6/UL (ref 4–5.2)
SALICYLATES SERPL-MCNC: <3 MG/DL
SODIUM SERPL-SCNC: 138 MMOL/L (ref 136–145)
WBC # BLD AUTO: 11.4 X10*3/UL (ref 4.4–11.3)

## 2024-03-15 PROCEDURE — 99284 EMERGENCY DEPT VISIT MOD MDM: CPT | Mod: 25 | Performed by: STUDENT IN AN ORGANIZED HEALTH CARE EDUCATION/TRAINING PROGRAM

## 2024-03-15 PROCEDURE — 36415 COLL VENOUS BLD VENIPUNCTURE: CPT | Performed by: PHYSICIAN ASSISTANT

## 2024-03-15 PROCEDURE — 85025 COMPLETE CBC W/AUTO DIFF WBC: CPT | Performed by: PHYSICIAN ASSISTANT

## 2024-03-15 PROCEDURE — 80143 DRUG ASSAY ACETAMINOPHEN: CPT | Performed by: PHYSICIAN ASSISTANT

## 2024-03-15 PROCEDURE — 84702 CHORIONIC GONADOTROPIN TEST: CPT | Performed by: PHYSICIAN ASSISTANT

## 2024-03-15 PROCEDURE — 80053 COMPREHEN METABOLIC PANEL: CPT | Performed by: PHYSICIAN ASSISTANT

## 2024-03-15 PROCEDURE — 93005 ELECTROCARDIOGRAM TRACING: CPT

## 2024-03-15 RX ORDER — BACITRACIN ZINC 500 UNIT/G
OINTMENT IN PACKET (EA) TOPICAL ONCE
Status: DISCONTINUED | OUTPATIENT
Start: 2024-03-15 | End: 2024-03-16 | Stop reason: HOSPADM

## 2024-03-15 SDOH — HEALTH STABILITY: MENTAL HEALTH: DEPRESSION SYMPTOMS: NO PROBLEMS REPORTED OR OBSERVED.

## 2024-03-15 SDOH — HEALTH STABILITY: MENTAL HEALTH: ANXIETY SYMPTOMS: NO PROBLEMS REPORTED OR OBSERVED.

## 2024-03-15 ASSESSMENT — COLUMBIA-SUICIDE SEVERITY RATING SCALE - C-SSRS
2. HAVE YOU ACTUALLY HAD ANY THOUGHTS OF KILLING YOURSELF?: YES
4. HAVE YOU HAD THESE THOUGHTS AND HAD SOME INTENTION OF ACTING ON THEM?: YES
6. HAVE YOU EVER DONE ANYTHING, STARTED TO DO ANYTHING, OR PREPARED TO DO ANYTHING TO END YOUR LIFE?: YES
5. HAVE YOU STARTED TO WORK OUT OR WORKED OUT THE DETAILS OF HOW TO KILL YOURSELF? DO YOU INTEND TO CARRY OUT THIS PLAN?: YES
6. HAVE YOU EVER DONE ANYTHING, STARTED TO DO ANYTHING, OR PREPARED TO DO ANYTHING TO END YOUR LIFE?: YES
1. IN THE PAST MONTH, HAVE YOU WISHED YOU WERE DEAD OR WISHED YOU COULD GO TO SLEEP AND NOT WAKE UP?: YES

## 2024-03-15 ASSESSMENT — PAIN SCALES - GENERAL: PAINLEVEL_OUTOF10: 0 - NO PAIN

## 2024-03-15 ASSESSMENT — PAIN - FUNCTIONAL ASSESSMENT: PAIN_FUNCTIONAL_ASSESSMENT: 0-10

## 2024-03-15 ASSESSMENT — LIFESTYLE VARIABLES
EVER FELT BAD OR GUILTY ABOUT YOUR DRINKING: NO
EVER HAD A DRINK FIRST THING IN THE MORNING TO STEADY YOUR NERVES TO GET RID OF A HANGOVER: NO
HAVE PEOPLE ANNOYED YOU BY CRITICIZING YOUR DRINKING: NO
HAVE YOU EVER FELT YOU SHOULD CUT DOWN ON YOUR DRINKING: NO
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
## 2024-03-16 LAB
ATRIAL RATE: 83 BPM
P AXIS: 50 DEGREES
P OFFSET: 199 MS
P ONSET: 150 MS
PR INTERVAL: 146 MS
Q ONSET: 223 MS
QRS COUNT: 14 BEATS
QRS DURATION: 70 MS
QT INTERVAL: 398 MS
QTC CALCULATION(BAZETT): 467 MS
QTC FREDERICIA: 443 MS
R AXIS: 33 DEGREES
T AXIS: 27 DEGREES
T OFFSET: 422 MS
VENTRICULAR RATE: 83 BPM

## 2024-04-27 ENCOUNTER — APPOINTMENT (OUTPATIENT)
Dept: RADIOLOGY | Facility: HOSPITAL | Age: 39
End: 2024-04-27
Payer: MEDICAID

## 2024-04-27 ENCOUNTER — HOSPITAL ENCOUNTER (EMERGENCY)
Facility: HOSPITAL | Age: 39
Discharge: HOME | End: 2024-04-27
Attending: STUDENT IN AN ORGANIZED HEALTH CARE EDUCATION/TRAINING PROGRAM
Payer: MEDICAID

## 2024-04-27 ENCOUNTER — APPOINTMENT (OUTPATIENT)
Dept: CARDIOLOGY | Facility: HOSPITAL | Age: 39
End: 2024-04-27
Payer: MEDICAID

## 2024-04-27 VITALS
TEMPERATURE: 97.7 F | SYSTOLIC BLOOD PRESSURE: 126 MMHG | HEIGHT: 60 IN | WEIGHT: 80 LBS | HEART RATE: 84 BPM | BODY MASS INDEX: 15.71 KG/M2 | DIASTOLIC BLOOD PRESSURE: 82 MMHG | OXYGEN SATURATION: 96 % | RESPIRATION RATE: 17 BRPM

## 2024-04-27 DIAGNOSIS — J69.0 ASPIRATION PNEUMONIA OF BOTH LUNGS, UNSPECIFIED ASPIRATION PNEUMONIA TYPE, UNSPECIFIED PART OF LUNG (MULTI): Primary | ICD-10-CM

## 2024-04-27 DIAGNOSIS — R11.0 NAUSEA: ICD-10-CM

## 2024-04-27 LAB
ALBUMIN SERPL BCP-MCNC: 3.5 G/DL (ref 3.4–5)
ALP SERPL-CCNC: 57 U/L (ref 33–110)
ALT SERPL W P-5'-P-CCNC: 13 U/L (ref 7–45)
ANION GAP SERPL CALC-SCNC: 9 MMOL/L (ref 10–20)
APPEARANCE UR: CLEAR
AST SERPL W P-5'-P-CCNC: 31 U/L (ref 9–39)
ATRIAL RATE: 96 BPM
B-HCG SERPL-ACNC: <2 MIU/ML
BACTERIA #/AREA URNS AUTO: ABNORMAL /HPF
BASOPHILS # BLD AUTO: 0.03 X10*3/UL (ref 0–0.1)
BASOPHILS NFR BLD AUTO: 0.2 %
BILIRUB DIRECT SERPL-MCNC: 0.1 MG/DL (ref 0–0.3)
BILIRUB SERPL-MCNC: 0.4 MG/DL (ref 0–1.2)
BILIRUB UR STRIP.AUTO-MCNC: NEGATIVE MG/DL
BUN SERPL-MCNC: 12 MG/DL (ref 6–23)
CALCIUM SERPL-MCNC: 8.7 MG/DL (ref 8.6–10.3)
CARDIAC TROPONIN I PNL SERPL HS: 3 NG/L (ref 0–13)
CARDIAC TROPONIN I PNL SERPL HS: 5 NG/L (ref 0–13)
CHLORIDE SERPL-SCNC: 108 MMOL/L (ref 98–107)
CO2 SERPL-SCNC: 25 MMOL/L (ref 21–32)
COLOR UR: YELLOW
CREAT SERPL-MCNC: 0.61 MG/DL (ref 0.5–1.05)
EGFRCR SERPLBLD CKD-EPI 2021: >90 ML/MIN/1.73M*2
EOSINOPHIL # BLD AUTO: 0.83 X10*3/UL (ref 0–0.7)
EOSINOPHIL NFR BLD AUTO: 6.2 %
ERYTHROCYTE [DISTWIDTH] IN BLOOD BY AUTOMATED COUNT: 12.5 % (ref 11.5–14.5)
FLUAV RNA RESP QL NAA+PROBE: NOT DETECTED
FLUBV RNA RESP QL NAA+PROBE: NOT DETECTED
GLUCOSE SERPL-MCNC: 98 MG/DL (ref 74–99)
GLUCOSE UR STRIP.AUTO-MCNC: NEGATIVE MG/DL
HCT VFR BLD AUTO: 38.9 % (ref 36–46)
HGB BLD-MCNC: 12.6 G/DL (ref 12–16)
IMM GRANULOCYTES # BLD AUTO: 0.04 X10*3/UL (ref 0–0.7)
IMM GRANULOCYTES NFR BLD AUTO: 0.3 % (ref 0–0.9)
KETONES UR STRIP.AUTO-MCNC: ABNORMAL MG/DL
LACTATE SERPL-SCNC: 0.9 MMOL/L (ref 0.4–2)
LEUKOCYTE ESTERASE UR QL STRIP.AUTO: ABNORMAL
LIPASE SERPL-CCNC: 15 U/L (ref 9–82)
LYMPHOCYTES # BLD AUTO: 1.56 X10*3/UL (ref 1.2–4.8)
LYMPHOCYTES NFR BLD AUTO: 11.7 %
MAGNESIUM SERPL-MCNC: 1.73 MG/DL (ref 1.6–2.4)
MCH RBC QN AUTO: 31.9 PG (ref 26–34)
MCHC RBC AUTO-ENTMCNC: 32.4 G/DL (ref 32–36)
MCV RBC AUTO: 99 FL (ref 80–100)
MONOCYTES # BLD AUTO: 0.87 X10*3/UL (ref 0.1–1)
MONOCYTES NFR BLD AUTO: 6.5 %
NEUTROPHILS # BLD AUTO: 9.98 X10*3/UL (ref 1.2–7.7)
NEUTROPHILS NFR BLD AUTO: 75.1 %
NITRITE UR QL STRIP.AUTO: POSITIVE
NRBC BLD-RTO: 0 /100 WBCS (ref 0–0)
P AXIS: 46 DEGREES
P OFFSET: 193 MS
P ONSET: 154 MS
PH UR STRIP.AUTO: 6 [PH]
PLATELET # BLD AUTO: 268 X10*3/UL (ref 150–450)
POTASSIUM SERPL-SCNC: 4.2 MMOL/L (ref 3.5–5.3)
PR INTERVAL: 140 MS
PROT SERPL-MCNC: 6.5 G/DL (ref 6.4–8.2)
PROT UR STRIP.AUTO-MCNC: ABNORMAL MG/DL
Q ONSET: 224 MS
QRS COUNT: 16 BEATS
QRS DURATION: 68 MS
QT INTERVAL: 378 MS
QTC CALCULATION(BAZETT): 477 MS
QTC FREDERICIA: 442 MS
R AXIS: 55 DEGREES
RBC # BLD AUTO: 3.95 X10*6/UL (ref 4–5.2)
RBC # UR STRIP.AUTO: NEGATIVE /UL
RBC #/AREA URNS AUTO: ABNORMAL /HPF
SARS-COV-2 RNA RESP QL NAA+PROBE: NOT DETECTED
SODIUM SERPL-SCNC: 138 MMOL/L (ref 136–145)
SP GR UR STRIP.AUTO: >1.06
T AXIS: 45 DEGREES
T OFFSET: 413 MS
UROBILINOGEN UR STRIP.AUTO-MCNC: <2 MG/DL
VENTRICULAR RATE: 96 BPM
WBC # BLD AUTO: 13.3 X10*3/UL (ref 4.4–11.3)
WBC #/AREA URNS AUTO: ABNORMAL /HPF

## 2024-04-27 PROCEDURE — 2500000004 HC RX 250 GENERAL PHARMACY W/ HCPCS (ALT 636 FOR OP/ED): Performed by: STUDENT IN AN ORGANIZED HEALTH CARE EDUCATION/TRAINING PROGRAM

## 2024-04-27 PROCEDURE — 71045 X-RAY EXAM CHEST 1 VIEW: CPT | Performed by: RADIOLOGY

## 2024-04-27 PROCEDURE — 83605 ASSAY OF LACTIC ACID: CPT | Performed by: STUDENT IN AN ORGANIZED HEALTH CARE EDUCATION/TRAINING PROGRAM

## 2024-04-27 PROCEDURE — 96361 HYDRATE IV INFUSION ADD-ON: CPT

## 2024-04-27 PROCEDURE — 96360 HYDRATION IV INFUSION INIT: CPT

## 2024-04-27 PROCEDURE — 74177 CT ABD & PELVIS W/CONTRAST: CPT | Performed by: RADIOLOGY

## 2024-04-27 PROCEDURE — 83690 ASSAY OF LIPASE: CPT | Performed by: STUDENT IN AN ORGANIZED HEALTH CARE EDUCATION/TRAINING PROGRAM

## 2024-04-27 PROCEDURE — 81001 URINALYSIS AUTO W/SCOPE: CPT | Performed by: STUDENT IN AN ORGANIZED HEALTH CARE EDUCATION/TRAINING PROGRAM

## 2024-04-27 PROCEDURE — 99285 EMERGENCY DEPT VISIT HI MDM: CPT | Mod: 25

## 2024-04-27 PROCEDURE — 82248 BILIRUBIN DIRECT: CPT | Performed by: STUDENT IN AN ORGANIZED HEALTH CARE EDUCATION/TRAINING PROGRAM

## 2024-04-27 PROCEDURE — 71045 X-RAY EXAM CHEST 1 VIEW: CPT

## 2024-04-27 PROCEDURE — 87636 SARSCOV2 & INF A&B AMP PRB: CPT | Performed by: STUDENT IN AN ORGANIZED HEALTH CARE EDUCATION/TRAINING PROGRAM

## 2024-04-27 PROCEDURE — 85025 COMPLETE CBC W/AUTO DIFF WBC: CPT | Performed by: STUDENT IN AN ORGANIZED HEALTH CARE EDUCATION/TRAINING PROGRAM

## 2024-04-27 PROCEDURE — 74177 CT ABD & PELVIS W/CONTRAST: CPT

## 2024-04-27 PROCEDURE — 80053 COMPREHEN METABOLIC PANEL: CPT | Performed by: STUDENT IN AN ORGANIZED HEALTH CARE EDUCATION/TRAINING PROGRAM

## 2024-04-27 PROCEDURE — 2550000001 HC RX 255 CONTRASTS: Performed by: STUDENT IN AN ORGANIZED HEALTH CARE EDUCATION/TRAINING PROGRAM

## 2024-04-27 PROCEDURE — 36415 COLL VENOUS BLD VENIPUNCTURE: CPT | Performed by: STUDENT IN AN ORGANIZED HEALTH CARE EDUCATION/TRAINING PROGRAM

## 2024-04-27 PROCEDURE — 76705 ECHO EXAM OF ABDOMEN: CPT | Performed by: RADIOLOGY

## 2024-04-27 PROCEDURE — 76705 ECHO EXAM OF ABDOMEN: CPT

## 2024-04-27 PROCEDURE — 83735 ASSAY OF MAGNESIUM: CPT | Performed by: STUDENT IN AN ORGANIZED HEALTH CARE EDUCATION/TRAINING PROGRAM

## 2024-04-27 PROCEDURE — 84484 ASSAY OF TROPONIN QUANT: CPT | Performed by: STUDENT IN AN ORGANIZED HEALTH CARE EDUCATION/TRAINING PROGRAM

## 2024-04-27 PROCEDURE — 84702 CHORIONIC GONADOTROPIN TEST: CPT | Performed by: STUDENT IN AN ORGANIZED HEALTH CARE EDUCATION/TRAINING PROGRAM

## 2024-04-27 PROCEDURE — 93005 ELECTROCARDIOGRAM TRACING: CPT

## 2024-04-27 PROCEDURE — 2500000001 HC RX 250 WO HCPCS SELF ADMINISTERED DRUGS (ALT 637 FOR MEDICARE OP): Performed by: STUDENT IN AN ORGANIZED HEALTH CARE EDUCATION/TRAINING PROGRAM

## 2024-04-27 RX ORDER — CLINDAMYCIN HYDROCHLORIDE 150 MG/1
450 CAPSULE ORAL 3 TIMES DAILY
Qty: 45 CAPSULE | Refills: 0 | Status: SHIPPED | OUTPATIENT
Start: 2024-04-27 | End: 2024-04-27

## 2024-04-27 RX ORDER — CLINDAMYCIN HYDROCHLORIDE 150 MG/1
450 CAPSULE ORAL ONCE
Status: COMPLETED | OUTPATIENT
Start: 2024-04-27 | End: 2024-04-27

## 2024-04-27 RX ORDER — LEVOFLOXACIN 750 MG/1
750 TABLET ORAL DAILY
Qty: 5 TABLET | Refills: 0 | Status: SHIPPED | OUTPATIENT
Start: 2024-04-27 | End: 2024-04-27

## 2024-04-27 RX ORDER — LEVOFLOXACIN 750 MG/1
750 TABLET ORAL DAILY
Qty: 5 TABLET | Refills: 0 | Status: SHIPPED | OUTPATIENT
Start: 2024-04-27 | End: 2024-05-02

## 2024-04-27 RX ORDER — CLINDAMYCIN HYDROCHLORIDE 150 MG/1
450 CAPSULE ORAL 3 TIMES DAILY
Qty: 45 CAPSULE | Refills: 0 | Status: SHIPPED | OUTPATIENT
Start: 2024-04-27 | End: 2024-05-02

## 2024-04-27 RX ORDER — LEVOFLOXACIN 250 MG/1
750 TABLET ORAL ONCE
Status: COMPLETED | OUTPATIENT
Start: 2024-04-27 | End: 2024-04-27

## 2024-04-27 RX ADMIN — SODIUM CHLORIDE, POTASSIUM CHLORIDE, SODIUM LACTATE AND CALCIUM CHLORIDE 500 ML: 600; 310; 30; 20 INJECTION, SOLUTION INTRAVENOUS at 15:38

## 2024-04-27 RX ADMIN — IOHEXOL 54 ML: 350 INJECTION, SOLUTION INTRAVENOUS at 16:56

## 2024-04-27 RX ADMIN — CLINDAMYCIN HYDROCHLORIDE 450 MG: 150 CAPSULE ORAL at 18:32

## 2024-04-27 RX ADMIN — LEVOFLOXACIN 750 MG: 250 TABLET, FILM COATED ORAL at 18:32

## 2024-04-27 ASSESSMENT — LIFESTYLE VARIABLES
HAVE PEOPLE ANNOYED YOU BY CRITICIZING YOUR DRINKING: NO
TOTAL SCORE: 0
EVER FELT BAD OR GUILTY ABOUT YOUR DRINKING: NO
HAVE YOU EVER FELT YOU SHOULD CUT DOWN ON YOUR DRINKING: NO
EVER HAD A DRINK FIRST THING IN THE MORNING TO STEADY YOUR NERVES TO GET RID OF A HANGOVER: NO

## 2024-04-27 ASSESSMENT — PAIN - FUNCTIONAL ASSESSMENT: PAIN_FUNCTIONAL_ASSESSMENT: 0-10

## 2024-04-27 ASSESSMENT — PAIN SCALES - GENERAL
PAINLEVEL_OUTOF10: 0 - NO PAIN
PAINLEVEL_OUTOF10: 0 - NO PAIN

## 2024-04-27 NOTE — ED PROVIDER NOTES
HPI: The patient is a 38-year-old woman with history of cerebral palsy, hypertension, CHF, tracheostomy, G-tube and ostomy who presents to the Emergency Department with a chief complaint of nausea vomiting right upper quadrant abdominal pain.  Patient presented by EMS after she told staff at her facility that she was having nausea and vomiting and did vomit once.  EMS gave her Zofran and the patient reports complete resolution of the nausea and vomiting but has had 2 to 3 days of right upper quadrant abdominal pain.  Denies any fevers chills shortness of breath or coughing.  Denies any chest pain.     PAST MEDICAL HISTORY:  as per HPI  ALLERGIES:  as per HPI  MEDICATIONS:  as per HPI  FAMILY HISTORY: as per HPI  SURGICAL HISTORY: as per HPI  SOCIAL HISTORY: as per HPI     PHYSICAL EXAM:  VITAL SIGNS: Nursing notes reviewed.  GENERAL:  Alert and interactive  EYES:   Eyes track.  NECK: Tracheostomy in place  ENT:  Airway patent.  RESPIRATORY:  Nonlabored breathing.  Clear bilaterally  CARDIOVASCULAR:  [Regular rate.] [Regular rhythm.]  GASTROINTESTINAL:  No distension.  Right upper quadrant tenderness.  No rebound rigidity or guarding.  PEG tube in place.  Ostomy well-perfused with stool in ostomy bag  MUSCULOSKELETAL:  No deformity.   NEUROLOGICAL:  Awake.  SKIN:  Dry.        MEDICAL DECISION MAKING (MDM):     DIAGNOSTIC STUDIES  Labs: BMP, CBC, hCG, blood function of the lactate, lipase, magnesium level, COVID and flu swab, troponin, UA  Radiology: CT on pelvis, chest x-ray     EKG  Per my interpretation:  Electrocardiogram ECG  RATE:  [Normal]  RHYTHM: [Sinus]  AXIS: [Normal]  INTERVALS: [Normal]  ST-T WAVE CHANGES: Nonspecific changes  ABNORMALITIES/COMPARISON: Unchanged from most recent EKG on arch 15 2024       REVIEW OF OLD RECORDS: Reviewed the DC summary from 5/26/2023     SUMMARY:  The patient is admitted to the Emergency Department for evaluation of above. Complete history and physical examination was  performed by me.  Patient presents with right upper quadrant tenderness and vomiting.  Patient is well-appearing overall and has control of her nausea and vomiting based on EMS giving Zofran.  I sent her for CT ab pelvis, chest x-ray to make sure she can aspirate, and EKG blood work and urine analysis.  I also sent viral swabs.  EKG not consistent with occlusive MI. Patient did not have evidence of an KAROL or clinically significant electrolyte abnormality.  Bilirubin and AST and LFT were normal which is reassuring point away from acute hepatitis or cholestasis.  Lactate was normal pointing away from hypoperfusion and lipase was normal pointing from pancreatitis.  Given the reassuring EKG and the troponin that was not elevated this points further away from occlusive MI.  Chest x-ray and CTs did show some evidence of potential aspiration multifocal pneumonia so I did treat the patient with levofloxacin and clindamycin based on her allergy profile.  She also had some stable chronic changes seen on the CT abdomen pelvis but no acute abnormality to explain her presentation.  The patient did have some stranding in the perirectal area but did not report any rectal pain and per the radiologist this is largely unchanged from previous imaging.  I do not think that this is the cause of her presentation today and she would benefit from outpatient follow-up.  Given that most of her pain is in the right upper quadrant, I did want to get a right upper quadrant ultrasound to evaluate further.  Patient was signed out to my colleague, Dr. Burgos, pending results of the ultrasound.     DIAGNOSIS:    Aspiration pneumonia  Nausea and vomiting     DISPOSITION:    1) handoff     Tre Portillo MD  04/27/24 3274

## 2024-04-28 NOTE — PROGRESS NOTES
Emergency Medicine Transition of Care Note.    I received Carolina Chowdhury in signout from Dr. Portillo.  Please see the previous ED provider note for all HPI, PE and MDM up to the time of signout at 1900. This is in addition to the primary record.    In brief Carolina Chowdhury is an 38 y.o. female presenting for   Chief Complaint   Patient presents with    Nausea/vomiting     N/V past 2 days. Pt. Received 4mg Zofran, pt. States she no longer feels nauseous     At the time of signout we were awaiting: RUQ US    Diagnoses as of 04/27/24 2020   Aspiration pneumonia of both lungs, unspecified aspiration pneumonia type, unspecified part of lung (Multi)   Nausea       Medical Decision Making  Took over patient care at sign over pending completion of right upper quadrant ultrasound.  Ultrasound negative for evidence of acute cholecystitis or choledocholithiasis.  Patient stable for discharge back to group home.  Patient prescribed oral antibiotics for aspiration pneumonia.  Patient also has nitrate positive urine with bacteria present, no white cells.    Final diagnoses:   [J69.0] Aspiration pneumonia of both lungs, unspecified aspiration pneumonia type, unspecified part of lung (Multi)   [R11.0] Nausea           Procedure  Procedures    Wesley Alvarez MD

## 2024-06-03 ENCOUNTER — HOSPITAL ENCOUNTER (EMERGENCY)
Facility: HOSPITAL | Age: 39
Discharge: HOME | End: 2024-06-03
Attending: EMERGENCY MEDICINE
Payer: MEDICAID

## 2024-06-03 ENCOUNTER — APPOINTMENT (OUTPATIENT)
Dept: RADIOLOGY | Facility: HOSPITAL | Age: 39
End: 2024-06-03
Payer: MEDICAID

## 2024-06-03 VITALS
BODY MASS INDEX: 16.01 KG/M2 | OXYGEN SATURATION: 97 % | DIASTOLIC BLOOD PRESSURE: 82 MMHG | HEART RATE: 99 BPM | TEMPERATURE: 99.9 F | WEIGHT: 81.57 LBS | RESPIRATION RATE: 17 BRPM | SYSTOLIC BLOOD PRESSURE: 136 MMHG | HEIGHT: 60 IN

## 2024-06-03 DIAGNOSIS — E16.2 HYPOGLYCEMIA: ICD-10-CM

## 2024-06-03 DIAGNOSIS — K59.00 CONSTIPATION, UNSPECIFIED CONSTIPATION TYPE: Primary | ICD-10-CM

## 2024-06-03 DIAGNOSIS — D72.829 LEUKOCYTOSIS, UNSPECIFIED TYPE: ICD-10-CM

## 2024-06-03 LAB
ALBUMIN SERPL BCP-MCNC: 4.2 G/DL (ref 3.4–5)
ALP SERPL-CCNC: 73 U/L (ref 33–110)
ALT SERPL W P-5'-P-CCNC: 18 U/L (ref 7–45)
ANION GAP SERPL CALC-SCNC: 10 MMOL/L (ref 10–20)
AST SERPL W P-5'-P-CCNC: 16 U/L (ref 9–39)
BASOPHILS # BLD AUTO: 0.05 X10*3/UL (ref 0–0.1)
BASOPHILS NFR BLD AUTO: 0.2 %
BILIRUB SERPL-MCNC: 0.5 MG/DL (ref 0–1.2)
BUN SERPL-MCNC: 12 MG/DL (ref 6–23)
CALCIUM SERPL-MCNC: 9.5 MG/DL (ref 8.6–10.3)
CHLORIDE SERPL-SCNC: 104 MMOL/L (ref 98–107)
CO2 SERPL-SCNC: 29 MMOL/L (ref 21–32)
CREAT SERPL-MCNC: 0.68 MG/DL (ref 0.5–1.05)
EGFRCR SERPLBLD CKD-EPI 2021: >90 ML/MIN/1.73M*2
EOSINOPHIL # BLD AUTO: 0.37 X10*3/UL (ref 0–0.7)
EOSINOPHIL NFR BLD AUTO: 1.8 %
ERYTHROCYTE [DISTWIDTH] IN BLOOD BY AUTOMATED COUNT: 12.3 % (ref 11.5–14.5)
GLUCOSE BLD MANUAL STRIP-MCNC: 239 MG/DL (ref 74–99)
GLUCOSE SERPL-MCNC: 62 MG/DL (ref 74–99)
HCT VFR BLD AUTO: 46 % (ref 36–46)
HGB BLD-MCNC: 15.1 G/DL (ref 12–16)
IMM GRANULOCYTES # BLD AUTO: 0.07 X10*3/UL (ref 0–0.7)
IMM GRANULOCYTES NFR BLD AUTO: 0.3 % (ref 0–0.9)
LIPASE SERPL-CCNC: 38 U/L (ref 9–82)
LYMPHOCYTES # BLD AUTO: 1.45 X10*3/UL (ref 1.2–4.8)
LYMPHOCYTES NFR BLD AUTO: 7.2 %
MCH RBC QN AUTO: 31.7 PG (ref 26–34)
MCHC RBC AUTO-ENTMCNC: 32.8 G/DL (ref 32–36)
MCV RBC AUTO: 96 FL (ref 80–100)
MONOCYTES # BLD AUTO: 1.12 X10*3/UL (ref 0.1–1)
MONOCYTES NFR BLD AUTO: 5.5 %
NEUTROPHILS # BLD AUTO: 17.21 X10*3/UL (ref 1.2–7.7)
NEUTROPHILS NFR BLD AUTO: 85 %
NRBC BLD-RTO: 0 /100 WBCS (ref 0–0)
PLATELET # BLD AUTO: 418 X10*3/UL (ref 150–450)
POTASSIUM SERPL-SCNC: 4.2 MMOL/L (ref 3.5–5.3)
PROT SERPL-MCNC: 7.8 G/DL (ref 6.4–8.2)
RBC # BLD AUTO: 4.77 X10*6/UL (ref 4–5.2)
SODIUM SERPL-SCNC: 139 MMOL/L (ref 136–145)
WBC # BLD AUTO: 20.3 X10*3/UL (ref 4.4–11.3)

## 2024-06-03 PROCEDURE — 85025 COMPLETE CBC W/AUTO DIFF WBC: CPT | Performed by: EMERGENCY MEDICINE

## 2024-06-03 PROCEDURE — 96374 THER/PROPH/DIAG INJ IV PUSH: CPT

## 2024-06-03 PROCEDURE — 36415 COLL VENOUS BLD VENIPUNCTURE: CPT | Performed by: EMERGENCY MEDICINE

## 2024-06-03 PROCEDURE — 2500000004 HC RX 250 GENERAL PHARMACY W/ HCPCS (ALT 636 FOR OP/ED): Performed by: EMERGENCY MEDICINE

## 2024-06-03 PROCEDURE — 2500000001 HC RX 250 WO HCPCS SELF ADMINISTERED DRUGS (ALT 637 FOR MEDICARE OP): Performed by: EMERGENCY MEDICINE

## 2024-06-03 PROCEDURE — 2500000005 HC RX 250 GENERAL PHARMACY W/O HCPCS: Performed by: EMERGENCY MEDICINE

## 2024-06-03 PROCEDURE — 99284 EMERGENCY DEPT VISIT MOD MDM: CPT | Mod: 25

## 2024-06-03 PROCEDURE — 82947 ASSAY GLUCOSE BLOOD QUANT: CPT | Mod: 59

## 2024-06-03 PROCEDURE — 74176 CT ABD & PELVIS W/O CONTRAST: CPT | Performed by: RADIOLOGY

## 2024-06-03 PROCEDURE — 83690 ASSAY OF LIPASE: CPT | Performed by: EMERGENCY MEDICINE

## 2024-06-03 PROCEDURE — 80053 COMPREHEN METABOLIC PANEL: CPT | Performed by: EMERGENCY MEDICINE

## 2024-06-03 PROCEDURE — 74176 CT ABD & PELVIS W/O CONTRAST: CPT

## 2024-06-03 RX ORDER — DEXTROSE 50 % IN WATER (D50W) INTRAVENOUS SYRINGE
25
Status: DISCONTINUED | OUTPATIENT
Start: 2024-06-03 | End: 2024-06-03 | Stop reason: HOSPADM

## 2024-06-03 RX ORDER — MORPHINE SULFATE 4 MG/ML
4 INJECTION, SOLUTION INTRAMUSCULAR; INTRAVENOUS ONCE
Status: DISCONTINUED | OUTPATIENT
Start: 2024-06-03 | End: 2024-06-03 | Stop reason: HOSPADM

## 2024-06-03 RX ORDER — SYRING-NEEDL,DISP,INSUL,0.3 ML 29 G X1/2"
296 SYRINGE, EMPTY DISPOSABLE MISCELLANEOUS ONCE
Status: COMPLETED | OUTPATIENT
Start: 2024-06-03 | End: 2024-06-03

## 2024-06-03 RX ORDER — POLYETHYLENE GLYCOL 3350 17 G/17G
17 POWDER, FOR SOLUTION ORAL DAILY
Qty: 30 PACKET | Refills: 0 | Status: SHIPPED | OUTPATIENT
Start: 2024-06-03

## 2024-06-03 RX ORDER — DEXTROSE 50 % IN WATER (D50W) INTRAVENOUS SYRINGE
12.5
Status: DISCONTINUED | OUTPATIENT
Start: 2024-06-03 | End: 2024-06-03 | Stop reason: HOSPADM

## 2024-06-03 RX ORDER — SYRING-NEEDL,DISP,INSUL,0.3 ML 29 G X1/2"
150 SYRINGE, EMPTY DISPOSABLE MISCELLANEOUS 2 TIMES DAILY
Qty: 300 ML | Refills: 0 | Status: SHIPPED | OUTPATIENT
Start: 2024-06-03 | End: 2024-06-04

## 2024-06-03 RX ADMIN — SODIUM CHLORIDE 1000 ML: 9 INJECTION, SOLUTION INTRAVENOUS at 10:30

## 2024-06-03 RX ADMIN — DEXTROSE MONOHYDRATE 25 G: 25 INJECTION, SOLUTION INTRAVENOUS at 11:41

## 2024-06-03 RX ADMIN — MAGNESIUM CITRATE 296 ML: 1.75 LIQUID ORAL at 11:41

## 2024-06-03 RX ADMIN — PROMETHAZINE HYDROCHLORIDE 12.5 MG: 25 INJECTION INTRAMUSCULAR; INTRAVENOUS at 10:29

## 2024-06-03 ASSESSMENT — PAIN SCALES - GENERAL
PAINLEVEL_OUTOF10: 0 - NO PAIN
PAINLEVEL_OUTOF10: 0 - NO PAIN

## 2024-06-03 ASSESSMENT — PAIN - FUNCTIONAL ASSESSMENT: PAIN_FUNCTIONAL_ASSESSMENT: 0-10

## 2024-06-03 ASSESSMENT — LIFESTYLE VARIABLES
EVER FELT BAD OR GUILTY ABOUT YOUR DRINKING: NO
TOTAL SCORE: 0
HAVE PEOPLE ANNOYED YOU BY CRITICIZING YOUR DRINKING: NO
EVER HAD A DRINK FIRST THING IN THE MORNING TO STEADY YOUR NERVES TO GET RID OF A HANGOVER: NO
HAVE YOU EVER FELT YOU SHOULD CUT DOWN ON YOUR DRINKING: NO

## 2024-06-03 ASSESSMENT — COLUMBIA-SUICIDE SEVERITY RATING SCALE - C-SSRS
1. IN THE PAST MONTH, HAVE YOU WISHED YOU WERE DEAD OR WISHED YOU COULD GO TO SLEEP AND NOT WAKE UP?: NO
2. HAVE YOU ACTUALLY HAD ANY THOUGHTS OF KILLING YOURSELF?: NO
6. HAVE YOU EVER DONE ANYTHING, STARTED TO DO ANYTHING, OR PREPARED TO DO ANYTHING TO END YOUR LIFE?: NO

## 2024-06-03 NOTE — ED PROVIDER NOTES
HPI   Chief Complaint   Patient presents with    Constipation     NO OUTPUT FOR OSTOMY BAG          History provided by:  Patient and EMS personnel  History of Present Illness:  39-year-old female presents with abdominal pain.  She has associated nausea and vomiting.  The patient has had decreased output from her ostomy.  No fever or chills.  Nothing seems to make her symptoms worse or better.  No sick contacts.  No bad food exposures.      PMFSH:   As per HPI, otherwise nurses notes reviewed in EMR.    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    Moderate intellectual disabilities     Personal history of diseases of the skin and subcutaneous tissue     Personal history of other diseases of the circulatory system     Personal history of other diseases of the circulatory system     Personal history of other diseases of the digestive system     Personal history of other diseases of the respiratory system     Personal history of other endocrine, nutritional and metabolic disease     Personal history of other mental and behavioral disorders     Personal history of other specified conditions 08/10/2021    Personal history of urinary (tract) infections     Post-traumatic stress disorder, unspecified     Ulcer of esophagus without bleeding     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)     Unspecified astigmatism, unspecified eye       Past Surgical History:   Past Surgical History:   Procedure Laterality Date    OTHER SURGICAL HISTORY  08/10/2021    Laryngectomy    OTHER SURGICAL HISTORY  08/10/2021    Tracheostomy      Family History: No family history on file.   Social History:    Social History     Socioeconomic History     Marital status: Single     Spouse name: Not on file    Number of children: Not on file    Years of education: Not on file    Highest education level: Not on file   Occupational History    Not on file   Tobacco Use    Smoking status: Never    Smokeless tobacco: Never   Substance and Sexual Activity    Alcohol use: Not on file    Drug use: Not on file    Sexual activity: Not on file   Other Topics Concern    Not on file   Social History Narrative    Not on file     Social Determinants of Health     Financial Resource Strain: Not on file   Food Insecurity: Not on file   Transportation Needs: Not on file   Physical Activity: Not on file   Stress: Not on file   Social Connections: Not on file   Intimate Partner Violence: Not on file   Housing Stability: Not on file                          Ashley Coma Scale Score: 14                     Patient History   Past Medical History:   Diagnosis Date    Essential (primary) hypertension     Hypertension, benign    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    S/P percutaneous endoscopic gastrostomy (PEG) tube placement    Moderate intellectual disabilities     Moderate intellectual disability    Personal history of diseases of the skin and subcutaneous tissue     History of atopic dermatitis    Personal history of other diseases of the circulatory system     History of cardiomegaly    Personal history of other diseases of the circulatory system     History of congestive heart failure    Personal history of other diseases of the digestive system     History of gastroesophageal reflux (GERD)    Personal history of other diseases of the respiratory system     History of tracheitis    Personal history of other endocrine, nutritional and metabolic disease     History of vitamin D deficiency    Personal history of other mental and behavioral disorders     History of depression    Personal history of other specified conditions 08/10/2021    History of dysphagia    Personal history of  urinary (tract) infections     History of urinary tract infection    Post-traumatic stress disorder, unspecified     PTSD (post-traumatic stress disorder)    Ulcer of esophagus without bleeding     Ulcerative esophagitis    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  Social History     Tobacco Use    Smoking status: Never    Smokeless tobacco: Never   Substance Use Topics    Alcohol use: Not on file    Drug use: Not on file       Physical Exam   ED Triage Vitals   Temp Pulse Resp BP   -- -- -- --      SpO2 Temp src Heart Rate Source Patient Position   -- -- -- --      BP Location FiO2 (%)     -- --       Physical Exam  Physical Exam:    ED Triage Vitals   Temp Pulse Resp BP   -- -- -- --      SpO2 Temp src Heart Rate Source Patient Position   -- -- -- --      BP Location FiO2 (%)     -- --         Constitutional: Vital signs per nursing notes.  Well developed, well nourished.  No acute distress.    Psychiatric: alert and oriented to person, place, and time; at baseline  Eyes: PERRL; conjunctivae and lids normal; EOMI  ENT: otoscopic exam of external canal and TM´s normal; nasal mucosa, turbinates, and septum normal; mouth, tongue, and pharynx normal; pharynx without edema, exudate, or injection; chronic trach in place  Respiratory: normal respiratory effort and excursion; no rales, rhonchi, or wheezes; equal air entry  Cardiovascular: regular rate and rhythm; no murmurs, rubs or gallops; symmetric pulses; no edema; normal capillary refill; distal pulses present  Neurological: normal speech; CN II-XII grossly intact; normal motor and sensory function; no nystagmus; no pronator drift  GI: no masses, rebound or guarding; no  palpable, pulsatile mass; no organomegaly; no hernia; normal bowel sounds; (-) Oleary´s sign; (-) McBurney´s sign; (-) CVA tenderness; except mild generalized tenderness to palpation with ostomy in the left abdomen with no output but there is air in the bag; there is also a drain in place.  Lymphatic: no adenopathy of neck, groin  Musculoskeletal: normal digits and nails; no gross tendon or ligament injury; normal to palpation; normal strength/tone; neurovascular status intact; (-) Nestor´s sign; (-) straight leg raise  Skin: normal to inspection; normal to palpation; no rash    ED Course & MDM   Diagnoses as of 06/03/24 1329   Constipation, unspecified constipation type   Leukocytosis, unspecified type   Hypoglycemia       Medical Decision Making  Medical Decision Making:    Differential Diagnoses Considered: Bowel obstruction, colitis     EKG:    Labs Reviewed   CBC WITH AUTO DIFFERENTIAL - Abnormal       Result Value    WBC 20.3 (*)     nRBC 0.0      RBC 4.77      Hemoglobin 15.1      Hematocrit 46.0      MCV 96      MCH 31.7      MCHC 32.8      RDW 12.3      Platelets 418      Neutrophils % 85.0      Immature Granulocytes %, Automated 0.3      Lymphocytes % 7.2      Monocytes % 5.5      Eosinophils % 1.8      Basophils % 0.2      Neutrophils Absolute 17.21 (*)     Immature Granulocytes Absolute, Automated 0.07      Lymphocytes Absolute 1.45      Monocytes Absolute 1.12 (*)     Eosinophils Absolute 0.37      Basophils Absolute 0.05     COMPREHENSIVE METABOLIC PANEL - Abnormal    Glucose 62 (*)     Sodium 139      Potassium 4.2      Chloride 104      Bicarbonate 29      Anion Gap 10      Urea Nitrogen 12      Creatinine 0.68      eGFR >90      Calcium 9.5      Albumin 4.2      Alkaline Phosphatase 73      Total Protein 7.8      AST 16      Bilirubin, Total 0.5      ALT 18     POCT GLUCOSE - Abnormal    POCT Glucose 239 (*)    LIPASE - Normal    Lipase 38      Narrative:     Venipuncture immediately after or during  the administration of Metamizole may lead to falsely low results. Testing should be performed immediately prior to Metamizole dosing.   URINALYSIS WITH REFLEX CULTURE AND MICROSCOPIC    Narrative:     The following orders were created for panel order Urinalysis with Reflex Culture and Microscopic.  Procedure                               Abnormality         Status                     ---------                               -----------         ------                     Urinalysis with Reflex C...[[id number]]                                                 Extra Urine Gray Tube[[id number]]                                                         Please view results for these tests on the individual orders.   URINALYSIS WITH REFLEX CULTURE AND MICROSCOPIC   EXTRA URINE GRAY TUBE       CT abdomen pelvis wo IV contrast   Final Result   1.  Large colonic stool burden.   2. Otherwise no acute findings of the abdomen or pelvis identified on   noncontrast exam.        MACRO:   None.        Signed by: Tello Chau 6/3/2024 11:26 AM   Dictation workstation:   [id number]          Diagnostic testing considered:         Review of recent and relevant records:    ED Medication Administration:   ED Medication Administration from 06/03/2024 0938 to 06/03/2024 1329         Date/Time Order Dose Route Action Action by     06/03/2024 1029 EDT morphine injection 4 mg 4 mg intravenous Not Given JENNIFFER Abraham     06/03/2024 1029 EDT promethazine (Phenergan) 12.5 mg in sodium chloride 0.9% 50 mL IV 12.5 mg intravenous Given JENNIFFER Abraham     06/03/2024 1030 EDT sodium chloride 0.9 % bolus 1,000 mL 1,000 mL intravenous New Bag JENNIFFER Abraham     06/03/2024 1100 EDT sodium chloride 0.9 % bolus 1,000 mL 0 mL intravenous Stopped JENNIFFER Abraham     06/03/2024 1141 EDT dextrose 50 % injection 25 g 25 g intravenous Given JENNIFFER Abraham     06/03/2024 1141 EDT magnesium citrate solution 296 mL 296 mL oral Given JENNIFFER Abraham            Prescription Medication  Consideration/Given:     Social Determinants of Health Significantly Affecting Care:      Summary:    BP      Temp      Pulse     Resp      SpO2        Abnormal Labs Reviewed   CBC WITH AUTO DIFFERENTIAL - Abnormal; Notable for the following components:       Result Value    WBC 20.3 (*)     Neutrophils Absolute 17.21 (*)     Monocytes Absolute 1.12 (*)     All other components within normal limits   COMPREHENSIVE METABOLIC PANEL - Abnormal; Notable for the following components:    Glucose 62 (*)     All other components within normal limits   POCT GLUCOSE - Abnormal; Notable for the following components:    POCT Glucose 239 (*)     All other components within normal limits     Diagnostic evaluation was completed.  Lipase in the normal range so do not suspect pancreatitis.  Metabolic panel demonstrated slightly low glucose at 62.  This was repleted with IV dextrose.  Sodium potassium in the normal range.  Renal and liver and function are in the normal range.  CBC shows an elevated white blood cell count of 20.3.  The significance of this is unclear at this time.  However I do not suspect overwhelming infection.  This is likely secondary to her vomiting.  There is no evidence of anemia and I do not suspect acute blood loss.  CT of the abdomen pelvis shows large colonic stool burden.  Otherwise no acute findings of the abdomen or pelvis identified on noncontrast exam.  Gastrostomy tube is still in place.  Distal colostomy is again noted.  There is large colonic stool burden proximal to the left lower quadrant colostomy.  No findings of bowel obstruction otherwise identified.    The patient was treated with a dose of magnesium citrate.  There was results out of the colostomy.  Vital signs are stable.    The patient will be discharged with recommended continued magnesium citrate, followed by MiraLAX.  She should then follow-up with her surgeon and GI.    I discussed the results and discharge plan with the patient and/or  family/friend if present.  I emphasized the importance of follow up with the physician I referred them to in the timeframe recommended.  I explained reasons for the patient to return to the Emergency Department.  Questions were addressed.  The patient and/or family/friend expressed understanding.          Diagnoses as of 06/03/24 1329   Constipation, unspecified constipation type   Leukocytosis, unspecified type   Hypoglycemia         Procedure  Procedures     Anil VASQUEZ MD  06/03/24 [id number]

## 2024-06-05 ENCOUNTER — APPOINTMENT (OUTPATIENT)
Dept: RADIOLOGY | Facility: HOSPITAL | Age: 39
End: 2024-06-05
Payer: MEDICAID

## 2024-06-05 ENCOUNTER — HOSPITAL ENCOUNTER (EMERGENCY)
Facility: HOSPITAL | Age: 39
Discharge: HOME | End: 2024-06-05
Attending: EMERGENCY MEDICINE
Payer: MEDICAID

## 2024-06-05 VITALS
HEART RATE: 80 BPM | TEMPERATURE: 97.9 F | BODY MASS INDEX: 23.09 KG/M2 | HEIGHT: 58 IN | WEIGHT: 110 LBS | SYSTOLIC BLOOD PRESSURE: 126 MMHG | DIASTOLIC BLOOD PRESSURE: 73 MMHG | RESPIRATION RATE: 20 BRPM | OXYGEN SATURATION: 100 %

## 2024-06-05 DIAGNOSIS — N39.0 URINARY TRACT INFECTION WITH HEMATURIA, SITE UNSPECIFIED: ICD-10-CM

## 2024-06-05 DIAGNOSIS — R11.2 NAUSEA AND VOMITING, UNSPECIFIED VOMITING TYPE: Primary | ICD-10-CM

## 2024-06-05 DIAGNOSIS — R31.9 URINARY TRACT INFECTION WITH HEMATURIA, SITE UNSPECIFIED: ICD-10-CM

## 2024-06-05 DIAGNOSIS — K59.00 CONSTIPATION, UNSPECIFIED CONSTIPATION TYPE: ICD-10-CM

## 2024-06-05 LAB
ALBUMIN SERPL BCP-MCNC: 3.9 G/DL (ref 3.4–5)
ALP SERPL-CCNC: 65 U/L (ref 33–110)
ALT SERPL W P-5'-P-CCNC: 15 U/L (ref 7–45)
ANION GAP SERPL CALC-SCNC: 10 MMOL/L (ref 10–20)
APPEARANCE UR: ABNORMAL
AST SERPL W P-5'-P-CCNC: 15 U/L (ref 9–39)
B-HCG SERPL-ACNC: <2 MIU/ML
BACTERIA #/AREA URNS AUTO: ABNORMAL /HPF
BASOPHILS # BLD AUTO: 0.06 X10*3/UL (ref 0–0.1)
BASOPHILS NFR BLD AUTO: 0.5 %
BILIRUB SERPL-MCNC: 0.3 MG/DL (ref 0–1.2)
BILIRUB UR STRIP.AUTO-MCNC: NEGATIVE MG/DL
BUN SERPL-MCNC: 15 MG/DL (ref 6–23)
CALCIUM SERPL-MCNC: 9.4 MG/DL (ref 8.6–10.3)
CHLORIDE SERPL-SCNC: 104 MMOL/L (ref 98–107)
CO2 SERPL-SCNC: 31 MMOL/L (ref 21–32)
COLOR UR: YELLOW
CREAT SERPL-MCNC: 0.7 MG/DL (ref 0.5–1.05)
EGFRCR SERPLBLD CKD-EPI 2021: >90 ML/MIN/1.73M*2
EOSINOPHIL # BLD AUTO: 0.42 X10*3/UL (ref 0–0.7)
EOSINOPHIL NFR BLD AUTO: 3.6 %
ERYTHROCYTE [DISTWIDTH] IN BLOOD BY AUTOMATED COUNT: 12.3 % (ref 11.5–14.5)
FLUAV RNA RESP QL NAA+PROBE: NOT DETECTED
FLUBV RNA RESP QL NAA+PROBE: NOT DETECTED
GLUCOSE SERPL-MCNC: 78 MG/DL (ref 74–99)
GLUCOSE UR STRIP.AUTO-MCNC: NEGATIVE MG/DL
HCT VFR BLD AUTO: 45.3 % (ref 36–46)
HGB BLD-MCNC: 14.6 G/DL (ref 12–16)
IMM GRANULOCYTES # BLD AUTO: 0.04 X10*3/UL (ref 0–0.7)
IMM GRANULOCYTES NFR BLD AUTO: 0.3 % (ref 0–0.9)
KETONES UR STRIP.AUTO-MCNC: NEGATIVE MG/DL
LACTATE SERPL-SCNC: 0.7 MMOL/L (ref 0.4–2)
LEUKOCYTE ESTERASE UR QL STRIP.AUTO: ABNORMAL
LIPASE SERPL-CCNC: 26 U/L (ref 9–82)
LYMPHOCYTES # BLD AUTO: 2.04 X10*3/UL (ref 1.2–4.8)
LYMPHOCYTES NFR BLD AUTO: 17.3 %
MCH RBC QN AUTO: 31.3 PG (ref 26–34)
MCHC RBC AUTO-ENTMCNC: 32.2 G/DL (ref 32–36)
MCV RBC AUTO: 97 FL (ref 80–100)
MONOCYTES # BLD AUTO: 0.74 X10*3/UL (ref 0.1–1)
MONOCYTES NFR BLD AUTO: 6.3 %
NEUTROPHILS # BLD AUTO: 8.51 X10*3/UL (ref 1.2–7.7)
NEUTROPHILS NFR BLD AUTO: 72 %
NITRITE UR QL STRIP.AUTO: NEGATIVE
NRBC BLD-RTO: 0 /100 WBCS (ref 0–0)
PH UR STRIP.AUTO: 8 [PH]
PLATELET # BLD AUTO: 435 X10*3/UL (ref 150–450)
POTASSIUM SERPL-SCNC: 4 MMOL/L (ref 3.5–5.3)
PROT SERPL-MCNC: 7.5 G/DL (ref 6.4–8.2)
PROT UR STRIP.AUTO-MCNC: ABNORMAL MG/DL
RBC # BLD AUTO: 4.66 X10*6/UL (ref 4–5.2)
RBC # UR STRIP.AUTO: NEGATIVE /UL
RBC #/AREA URNS AUTO: >20 /HPF
SARS-COV-2 RNA RESP QL NAA+PROBE: NOT DETECTED
SODIUM SERPL-SCNC: 141 MMOL/L (ref 136–145)
SP GR UR STRIP.AUTO: 1.06
SQUAMOUS #/AREA URNS AUTO: ABNORMAL /HPF
TRI-PHOS CRY #/AREA UR COMP ASSIST: ABNORMAL /HPF
UROBILINOGEN UR STRIP.AUTO-MCNC: <2 MG/DL
WBC # BLD AUTO: 11.8 X10*3/UL (ref 4.4–11.3)
WBC #/AREA URNS AUTO: >50 /HPF
WBC CLUMPS #/AREA URNS AUTO: ABNORMAL /HPF
YEAST BUDDING #/AREA UR COMP ASSIST: PRESENT /HPF

## 2024-06-05 PROCEDURE — 96361 HYDRATE IV INFUSION ADD-ON: CPT

## 2024-06-05 PROCEDURE — 87636 SARSCOV2 & INF A&B AMP PRB: CPT | Performed by: PHYSICIAN ASSISTANT

## 2024-06-05 PROCEDURE — 83690 ASSAY OF LIPASE: CPT | Performed by: PHYSICIAN ASSISTANT

## 2024-06-05 PROCEDURE — 74177 CT ABD & PELVIS W/CONTRAST: CPT

## 2024-06-05 PROCEDURE — 87086 URINE CULTURE/COLONY COUNT: CPT | Mod: ELYLAB | Performed by: PHYSICIAN ASSISTANT

## 2024-06-05 PROCEDURE — 99284 EMERGENCY DEPT VISIT MOD MDM: CPT | Mod: 25

## 2024-06-05 PROCEDURE — 96375 TX/PRO/DX INJ NEW DRUG ADDON: CPT

## 2024-06-05 PROCEDURE — 81003 URINALYSIS AUTO W/O SCOPE: CPT | Performed by: PHYSICIAN ASSISTANT

## 2024-06-05 PROCEDURE — 74177 CT ABD & PELVIS W/CONTRAST: CPT | Mod: FOREIGN READ | Performed by: RADIOLOGY

## 2024-06-05 PROCEDURE — 83605 ASSAY OF LACTIC ACID: CPT | Performed by: PHYSICIAN ASSISTANT

## 2024-06-05 PROCEDURE — 2550000001 HC RX 255 CONTRASTS: Performed by: PHYSICIAN ASSISTANT

## 2024-06-05 PROCEDURE — 2500000004 HC RX 250 GENERAL PHARMACY W/ HCPCS (ALT 636 FOR OP/ED): Performed by: PHYSICIAN ASSISTANT

## 2024-06-05 PROCEDURE — 84702 CHORIONIC GONADOTROPIN TEST: CPT | Performed by: PHYSICIAN ASSISTANT

## 2024-06-05 PROCEDURE — 80053 COMPREHEN METABOLIC PANEL: CPT | Performed by: PHYSICIAN ASSISTANT

## 2024-06-05 PROCEDURE — 71045 X-RAY EXAM CHEST 1 VIEW: CPT | Mod: FOREIGN READ | Performed by: RADIOLOGY

## 2024-06-05 PROCEDURE — 96374 THER/PROPH/DIAG INJ IV PUSH: CPT | Mod: 59

## 2024-06-05 PROCEDURE — 36415 COLL VENOUS BLD VENIPUNCTURE: CPT | Performed by: PHYSICIAN ASSISTANT

## 2024-06-05 PROCEDURE — 85025 COMPLETE CBC W/AUTO DIFF WBC: CPT | Performed by: PHYSICIAN ASSISTANT

## 2024-06-05 PROCEDURE — 2500000001 HC RX 250 WO HCPCS SELF ADMINISTERED DRUGS (ALT 637 FOR MEDICARE OP): Performed by: PHYSICIAN ASSISTANT

## 2024-06-05 PROCEDURE — 71045 X-RAY EXAM CHEST 1 VIEW: CPT

## 2024-06-05 RX ORDER — CEPHALEXIN 500 MG/1
500 CAPSULE ORAL ONCE
Status: COMPLETED | OUTPATIENT
Start: 2024-06-05 | End: 2024-06-05

## 2024-06-05 RX ORDER — FLUCONAZOLE 100 MG/1
150 TABLET ORAL ONCE
Status: COMPLETED | OUTPATIENT
Start: 2024-06-05 | End: 2024-06-05

## 2024-06-05 RX ORDER — CEPHALEXIN 250 MG/5ML
500 POWDER, FOR SUSPENSION ORAL 2 TIMES DAILY
Qty: 140 ML | Refills: 0 | Status: SHIPPED | OUTPATIENT
Start: 2024-06-05 | End: 2024-06-12

## 2024-06-05 RX ORDER — MORPHINE SULFATE 4 MG/ML
4 INJECTION, SOLUTION INTRAMUSCULAR; INTRAVENOUS ONCE
Status: COMPLETED | OUTPATIENT
Start: 2024-06-05 | End: 2024-06-05

## 2024-06-05 RX ORDER — PROMETHAZINE HYDROCHLORIDE 25 MG/1
25 TABLET ORAL EVERY 6 HOURS PRN
Qty: 28 TABLET | Refills: 0 | Status: SHIPPED | OUTPATIENT
Start: 2024-06-05 | End: 2024-06-12

## 2024-06-05 RX ADMIN — PROMETHAZINE HYDROCHLORIDE 12.5 MG: 25 INJECTION INTRAMUSCULAR; INTRAVENOUS at 12:53

## 2024-06-05 RX ADMIN — CEPHALEXIN 500 MG: 500 CAPSULE ORAL at 18:57

## 2024-06-05 RX ADMIN — MORPHINE SULFATE 4 MG: 4 INJECTION, SOLUTION INTRAMUSCULAR; INTRAVENOUS at 12:56

## 2024-06-05 RX ADMIN — IOHEXOL 75 ML: 350 INJECTION, SOLUTION INTRAVENOUS at 13:34

## 2024-06-05 RX ADMIN — SODIUM CHLORIDE 1000 ML: 9 INJECTION, SOLUTION INTRAVENOUS at 12:49

## 2024-06-05 RX ADMIN — FLUCONAZOLE 150 MG: 100 TABLET ORAL at 18:56

## 2024-06-05 ASSESSMENT — PAIN DESCRIPTION - LOCATION: LOCATION: ABDOMEN

## 2024-06-05 ASSESSMENT — PAIN - FUNCTIONAL ASSESSMENT
PAIN_FUNCTIONAL_ASSESSMENT: CPOT (CRITICAL CARE PAIN OBSERVATION TOOL)
PAIN_FUNCTIONAL_ASSESSMENT: CPOT (CRITICAL CARE PAIN OBSERVATION TOOL)

## 2024-06-05 ASSESSMENT — COLUMBIA-SUICIDE SEVERITY RATING SCALE - C-SSRS
6. HAVE YOU EVER DONE ANYTHING, STARTED TO DO ANYTHING, OR PREPARED TO DO ANYTHING TO END YOUR LIFE?: NO
2. HAVE YOU ACTUALLY HAD ANY THOUGHTS OF KILLING YOURSELF?: NO
1. IN THE PAST MONTH, HAVE YOU WISHED YOU WERE DEAD OR WISHED YOU COULD GO TO SLEEP AND NOT WAKE UP?: NO

## 2024-06-05 ASSESSMENT — LIFESTYLE VARIABLES
EVER FELT BAD OR GUILTY ABOUT YOUR DRINKING: NO
HAVE YOU EVER FELT YOU SHOULD CUT DOWN ON YOUR DRINKING: NO
HAVE PEOPLE ANNOYED YOU BY CRITICIZING YOUR DRINKING: NO
EVER HAD A DRINK FIRST THING IN THE MORNING TO STEADY YOUR NERVES TO GET RID OF A HANGOVER: NO
TOTAL SCORE: 0

## 2024-06-05 ASSESSMENT — PAIN DESCRIPTION - ORIENTATION: ORIENTATION: MID

## 2024-06-05 ASSESSMENT — PAIN DESCRIPTION - PAIN TYPE: TYPE: ACUTE PAIN

## 2024-06-05 ASSESSMENT — PAIN SCALES - GENERAL
PAINLEVEL_OUTOF10: 0 - NO PAIN
PAINLEVEL_OUTOF10: 0 - NO PAIN
PAINLEVEL_OUTOF10: 5 - MODERATE PAIN

## 2024-06-05 NOTE — ED PROVIDER NOTES
HPI   Chief Complaint   Patient presents with    Vomiting     Pt from [facility], brought in for vomitting and congestion.       A 39-year-old female patient with history of hypertension, G-tube, intellectual disabilities, GERD comes from [facility] with complaints of nausea vomiting that started today.  States points to the left side of her abdomen as area or pain is most significant.  For this purpose the patient was sent to the emergency department today for further evaluation.                          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    S/P percutaneous endoscopic gastrostomy (PEG) tube placement    Moderate intellectual disabilities     Moderate intellectual disability    Personal history of diseases of the skin and subcutaneous tissue     History of atopic dermatitis    Personal history of other diseases of the circulatory system     History of cardiomegaly    Personal history of other diseases of the circulatory system     History of congestive heart failure    Personal history of other diseases of the digestive system     History of gastroesophageal reflux (GERD)    Personal history of other diseases of the respiratory system     History of tracheitis    Personal history of other endocrine, nutritional and metabolic disease     History of vitamin D deficiency    Personal history of other mental and behavioral disorders     History of depression    Personal history of other specified conditions 08/10/2021    History of dysphagia    Personal history of urinary (tract) infections     History of urinary tract infection    Post-traumatic stress disorder, unspecified     PTSD (post-traumatic stress disorder)    Ulcer of esophagus without bleeding     Ulcerative esophagitis    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  Social History     Tobacco Use    Smoking status: Never    Smokeless tobacco: Never   Substance Use Topics    Alcohol use: Not on file    Drug use: Not on file       Physical Exam   ED Triage Vitals [06/05/24 1228]   Temperature Heart Rate Respirations BP   36.6 °C (97.9 °F) 81 20 122/74      SpO2 Temp Source Heart Rate Source Patient Position   -- Temporal Monitor --      BP Location FiO2 (%)     -- --       Physical Exam  Constitutional:       General: She is in acute distress.      Appearance: Normal appearance. She is ill-appearing. She is not diaphoretic.   HENT:      Head: Normocephalic and atraumatic.      Nose: Nose normal.   Eyes:      Extraocular Movements: Extraocular movements intact.      Conjunctiva/sclera: Conjunctivae normal.   Cardiovascular:      Rate and Rhythm: Normal rate and regular rhythm.   Pulmonary:      Effort: Pulmonary effort is normal. No respiratory distress.      Breath sounds: Normal breath sounds. No stridor. No wheezing.      Comments: Mucus at trach opening  Abdominal:      Tenderness: There is abdominal tenderness.      Comments: Left upper and lower quadrants   Musculoskeletal:         General: Normal range of motion.      Cervical back: Normal range of motion.   Skin:     General: Skin is warm and dry.   Neurological:      General: No focal deficit present.      Mental Status: She is alert and oriented to person, place, and time. Mental status is at baseline.         ED Course & MDM   Diagnoses as of 06/05/24 1624   Nausea and vomiting, unspecified vomiting type   Constipation, unspecified constipation type   Urinary tract infection with hematuria, site unspecified       Medical Decision Making  A  39-year-old female patient with history of hypertension, G-tube, intellectual disabilities, GERD comes from [facility] with complaints of nausea vomiting that started today.  States points to the left side of her abdomen as area or pain is most significant.  For this purpose the patient was sent to the emergency department today for further evaluation.    Laboratory studies ordered, CT of the abdomen pelvis, chest x-ray.  Rule out aspiration pneumonia, acute intra-abdominal surgical need including bowel obstruction, gastroenteritis, bowel perforation or abscess.  Rule out leukocytosis, left shift, electrolyte abnormalities.    Morphine IV given IV fluids ordered for the patient.    Patient negative lactate negative lipase metabolic panel within normal limits, lipids count 11.8 and absolute neutrophil 8.5 with no acute CT findings of enlargement of stool throughout the colon.  Patient will be discharged home with continued bowel regimen.  Patient found to have urinary tract infection as well as yeast in the urine.  Will give patient one-time dose of Diflucan here in the emergency department as well as first dose of Keflex.  Will discharge patient home with Keflex as well as Zofran.  Patient agrees with this plan expressed verbal understanding.  Patient states he is already on a bowel regiment I told her to continue to do so.  She agrees with this plan.    Historian is the patient    Diagnosis: Nausea vomiting, constipation, urinary tract infection      Labs Reviewed   CBC WITH AUTO DIFFERENTIAL - Abnormal       Result Value    WBC 11.8 (*)     nRBC 0.0      RBC 4.66      Hemoglobin 14.6      Hematocrit 45.3      MCV 97      MCH 31.3      MCHC 32.2      RDW 12.3      Platelets 435      Neutrophils % 72.0      Immature Granulocytes %, Automated 0.3      Lymphocytes % 17.3      Monocytes % 6.3      Eosinophils % 3.6      Basophils % 0.5      Neutrophils Absolute 8.51 (*)     Immature Granulocytes Absolute, Automated 0.04       Lymphocytes Absolute 2.04      Monocytes Absolute 0.74      Eosinophils Absolute 0.42      Basophils Absolute 0.06     URINALYSIS WITH REFLEX CULTURE AND MICROSCOPIC - Abnormal    Color, Urine Yellow      Appearance, Urine Hazy (*)     Specific Gravity, Urine 1.060 (*)     pH, Urine 8.0      Protein, Urine 100 (2+) (*)     Glucose, Urine NEGATIVE      Blood, Urine NEGATIVE      Ketones, Urine NEGATIVE      Bilirubin, Urine NEGATIVE      Urobilinogen, Urine <2.0      Nitrite, Urine NEGATIVE      Leukocyte Esterase, Urine LARGE (3+) (*)    MICROSCOPIC ONLY, URINE - Abnormal    WBC, Urine >50 (*)     WBC Clumps, Urine FEW      RBC, Urine >20 (*)     Squamous Epithelial Cells, Urine 1-9 (SPARSE)      Bacteria, Urine 1+ (*)     Budding Yeast, Urine PRESENT (*)     Triple Phosphate Crystals, Urine 1+     COMPREHENSIVE METABOLIC PANEL - Normal    Glucose 78      Sodium 141      Potassium 4.0      Chloride 104      Bicarbonate 31      Anion Gap 10      Urea Nitrogen 15      Creatinine 0.70      eGFR >90      Calcium 9.4      Albumin 3.9      Alkaline Phosphatase 65      Total Protein 7.5      AST 15      Bilirubin, Total 0.3      ALT 15     LIPASE - Normal    Lipase 26      Narrative:     Venipuncture immediately after or during the administration of Metamizole may lead to falsely low results. Testing should be performed immediately prior to Metamizole dosing.   LACTATE - Normal    Lactate 0.7      Narrative:     Venipuncture immediately after or during the administration of Metamizole may lead to falsely low results. Testing should be performed immediately  prior to Metamizole dosing.   SARS-COV-2 PCR - Normal    Coronavirus 2019, PCR Not Detected      Narrative:     This assay has received FDA Emergency Use Authorization (EUA) and is only authorized for the duration of time that circumstances exist to justify the authorization of the emergency use of in vitro diagnostic tests for the detection of SARS-CoV-2 virus  and/or diagnosis of COVID-19 infection under section 564(b)(1) of the Act, 21 U.S.C. 360bbb-3(b)(1). This assay is an in vitro diagnostic nucleic acid amplification test for the qualitative detection of SARS-CoV-2 from nasopharyngeal specimens and has been validated for use at [facility]. Negative results do not preclude COVID-19 infections and should not be used as the sole basis for diagnosis, treatment, or other management decisions.     INFLUENZA A AND B PCR - Normal    Flu A Result Not Detected      Flu B Result Not Detected      Narrative:     This assay is an in vitro diagnostic multiplex nucleic acid amplification test for the detection and discrimination of Influenza A & B from nasopharyngeal specimens, and has been validated for use at [facility]. Negative results do not preclude Influenza A/B infections, and should not be used as the sole basis for diagnosis, treatment, or other management decisions. If Influenza A/B and RSV PCR results are negative, testing for Parainfluenza virus, Adenovirus and Metapneumovirus is routinely performed for [facility] and intensive care inpatients, and is available on other patients by placing an add-on request.   HUMAN CHORIONIC GONADOTROPIN, SERUM QUANTITATIVE - Normal    HCG, Beta-Quantitative <2      Narrative:      Total HCG measurement is performed using the [employer] Access   Immunoassay which detects intact HCG and free beta HCG subunit.    This test is not indicated for use as a tumor marker.   HCG testing is performed using a different test methodology at [facility] than other [facility]. Direct result comparison   should only be made within the same method.       URINE CULTURE   URINALYSIS WITH REFLEX CULTURE AND MICROSCOPIC    Narrative:     The following orders were created for panel order Urinalysis with Reflex Culture and Microscopic.  Procedure                                Abnormality         Status                     ---------                               -----------         ------                     Urinalysis with Reflex C...[[id number]]  Abnormal            Final result               Extra Urine Gray Tube[[id number]]                            In process                   Please view results for these tests on the individual orders.   EXTRA URINE GRAY TUBE        CT abdomen pelvis w IV contrast   Final Result   No acute abnormality of the abdomen or pelvis.   Large amount of stool throughout the colon.   No free air or fluid.   Normal appendix.   .   Signed by Shawn Bal MD      XR chest 1 view   Final Result   1.Normal heart size with left basilar atelectasis also present   previously.   2.Improving bilateral nodular and patchy infiltrates with   possible mild residual. No new consolidation.   Signed by Kaitlyn Florez DO            Procedure  Procedures     Pancho Awan PA-C  06/05/24 1086

## 2024-06-06 LAB — HOLD SPECIMEN: NORMAL

## 2024-06-07 LAB — BACTERIA UR CULT: ABNORMAL

## 2024-06-08 ENCOUNTER — TELEPHONE (OUTPATIENT)
Dept: PHARMACY | Facility: HOSPITAL | Age: 39
End: 2024-06-08
Payer: MEDICAID

## 2024-06-08 NOTE — TELEPHONE ENCOUNTER
I reviewed the progress note and agree with the resident’s findings and plans as written. Case discussed with resident.    Marli Fair, YoungD

## 2024-06-22 ENCOUNTER — HOSPITAL ENCOUNTER (OUTPATIENT)
Age: 39
Setting detail: SPECIMEN
Discharge: HOME | End: 2024-06-22
Payer: MEDICAID

## 2024-06-22 LAB
BACTERIA URNS QL MICRO: NEGATIVE /HPF
BILIRUB UR QL STRIP: NEGATIVE
CLARITY UR: ABNORMAL
COLOR UR: YELLOW
CRYSTALS URNS MICRO: ABNORMAL /HPF
EPI CELLS #/AREA URNS HPF: ABNORMAL /HPF
GLUCOSE UR STRIP-MCNC: NEGATIVE MG/DL
HGB UR QL STRIP: ABNORMAL
KETONES UR STRIP-MCNC: ABNORMAL MG/DL
LEUKOCYTE ESTERASE UR QL STRIP: ABNORMAL
NITRITE UR QL STRIP: NEGATIVE
PH UR STRIP: 6.5 [PH] (ref 5–9)
PROT UR STRIP-MCNC: 100 MG/DL
RBC #/AREA URNS HPF: ABNORMAL /HPF (ref 0–2)
SP GR UR STRIP: 1.02 (ref 1–1.03)
UROBILINOGEN UR STRIP-ACNC: 0.2 E.U./DL
WBC #/AREA URNS HPF: ABNORMAL /HPF (ref 0–5)

## 2024-06-22 PROCEDURE — 87086 URINE CULTURE/COLONY COUNT: CPT

## 2024-06-22 PROCEDURE — 81001 URINALYSIS AUTO W/SCOPE: CPT

## 2024-06-23 LAB
BACTERIA UR CULT: ABNORMAL
BACTERIA UR CULT: ABNORMAL
ORGANISM: ABNORMAL

## 2024-06-24 LAB
BACTERIA UR CULT: ABNORMAL
BACTERIA UR CULT: ABNORMAL
ORGANISM: ABNORMAL

## 2024-06-26 ENCOUNTER — HOSPITAL ENCOUNTER (EMERGENCY)
Facility: HOSPITAL | Age: 39
Discharge: HOME | End: 2024-06-27
Attending: INTERNAL MEDICINE
Payer: MEDICAID

## 2024-06-26 ENCOUNTER — APPOINTMENT (OUTPATIENT)
Dept: CARDIOLOGY | Facility: HOSPITAL | Age: 39
End: 2024-06-26
Payer: MEDICAID

## 2024-06-26 ENCOUNTER — APPOINTMENT (OUTPATIENT)
Dept: RADIOLOGY | Facility: HOSPITAL | Age: 39
End: 2024-06-26
Payer: MEDICAID

## 2024-06-26 DIAGNOSIS — B37.9 YEAST DETECTED: ICD-10-CM

## 2024-06-26 DIAGNOSIS — R45.851 SUICIDAL IDEATION: ICD-10-CM

## 2024-06-26 DIAGNOSIS — R10.84 ABDOMINAL PAIN, GENERALIZED: Primary | ICD-10-CM

## 2024-06-26 DIAGNOSIS — K59.00 CONSTIPATION, UNSPECIFIED CONSTIPATION TYPE: ICD-10-CM

## 2024-06-26 LAB
ALBUMIN SERPL BCP-MCNC: 3.4 G/DL (ref 3.4–5)
ALP SERPL-CCNC: 63 U/L (ref 33–110)
ALT SERPL W P-5'-P-CCNC: 11 U/L (ref 7–45)
AMPHETAMINES UR QL SCN: NORMAL
ANION GAP SERPL CALC-SCNC: 11 MMOL/L (ref 10–20)
APAP SERPL-MCNC: <10 UG/ML
APPEARANCE UR: ABNORMAL
AST SERPL W P-5'-P-CCNC: 15 U/L (ref 9–39)
B-HCG SERPL-ACNC: <2 MIU/ML
BARBITURATES UR QL SCN: NORMAL
BASOPHILS # BLD AUTO: 0.05 X10*3/UL (ref 0–0.1)
BASOPHILS NFR BLD AUTO: 0.3 %
BENZODIAZ UR QL SCN: NORMAL
BILIRUB SERPL-MCNC: 0.3 MG/DL (ref 0–1.2)
BILIRUB UR STRIP.AUTO-MCNC: NEGATIVE MG/DL
BUN SERPL-MCNC: 11 MG/DL (ref 6–23)
BZE UR QL SCN: NORMAL
CALCIUM SERPL-MCNC: 8.6 MG/DL (ref 8.6–10.3)
CANNABINOIDS UR QL SCN: NORMAL
CHLORIDE SERPL-SCNC: 105 MMOL/L (ref 98–107)
CO2 SERPL-SCNC: 24 MMOL/L (ref 21–32)
COLOR UR: YELLOW
CREAT SERPL-MCNC: 0.49 MG/DL (ref 0.5–1.05)
EGFRCR SERPLBLD CKD-EPI 2021: >90 ML/MIN/1.73M*2
EOSINOPHIL # BLD AUTO: 1.73 X10*3/UL (ref 0–0.7)
EOSINOPHIL NFR BLD AUTO: 9.8 %
ERYTHROCYTE [DISTWIDTH] IN BLOOD BY AUTOMATED COUNT: 12.5 % (ref 11.5–14.5)
ETHANOL SERPL-MCNC: <10 MG/DL
FENTANYL+NORFENTANYL UR QL SCN: NORMAL
GLUCOSE SERPL-MCNC: 115 MG/DL (ref 74–99)
GLUCOSE UR STRIP.AUTO-MCNC: NORMAL MG/DL
HCT VFR BLD AUTO: 38.6 % (ref 36–46)
HGB BLD-MCNC: 12.9 G/DL (ref 12–16)
HOLD SPECIMEN: NORMAL
IMM GRANULOCYTES # BLD AUTO: 0.05 X10*3/UL (ref 0–0.7)
IMM GRANULOCYTES NFR BLD AUTO: 0.3 % (ref 0–0.9)
KETONES UR STRIP.AUTO-MCNC: NEGATIVE MG/DL
LEUKOCYTE ESTERASE UR QL STRIP.AUTO: NEGATIVE
LYMPHOCYTES # BLD AUTO: 1.55 X10*3/UL (ref 1.2–4.8)
LYMPHOCYTES NFR BLD AUTO: 8.8 %
MCH RBC QN AUTO: 31.1 PG (ref 26–34)
MCHC RBC AUTO-ENTMCNC: 33.4 G/DL (ref 32–36)
MCV RBC AUTO: 93 FL (ref 80–100)
METHADONE UR QL SCN: NORMAL
MONOCYTES # BLD AUTO: 1.16 X10*3/UL (ref 0.1–1)
MONOCYTES NFR BLD AUTO: 6.6 %
MUCOUS THREADS #/AREA URNS AUTO: ABNORMAL /LPF
NEUTROPHILS # BLD AUTO: 13.16 X10*3/UL (ref 1.2–7.7)
NEUTROPHILS NFR BLD AUTO: 74.2 %
NITRITE UR QL STRIP.AUTO: NEGATIVE
NRBC BLD-RTO: 0 /100 WBCS (ref 0–0)
OPIATES UR QL SCN: NORMAL
OXYCODONE+OXYMORPHONE UR QL SCN: NORMAL
PCP UR QL SCN: NORMAL
PH UR STRIP.AUTO: 8.5 [PH]
PLATELET # BLD AUTO: 289 X10*3/UL (ref 150–450)
POTASSIUM SERPL-SCNC: 3.5 MMOL/L (ref 3.5–5.3)
PROT SERPL-MCNC: 6.3 G/DL (ref 6.4–8.2)
PROT UR STRIP.AUTO-MCNC: ABNORMAL MG/DL
RBC # BLD AUTO: 4.15 X10*6/UL (ref 4–5.2)
RBC # UR STRIP.AUTO: NEGATIVE /UL
RBC #/AREA URNS AUTO: ABNORMAL /HPF
SALICYLATES SERPL-MCNC: <3 MG/DL
SODIUM SERPL-SCNC: 136 MMOL/L (ref 136–145)
SP GR UR STRIP.AUTO: 1.02
UROBILINOGEN UR STRIP.AUTO-MCNC: NORMAL MG/DL
WBC # BLD AUTO: 17.7 X10*3/UL (ref 4.4–11.3)
WBC #/AREA URNS AUTO: ABNORMAL /HPF
WBC CLUMPS #/AREA URNS AUTO: ABNORMAL /HPF
YEAST BUDDING #/AREA UR COMP ASSIST: PRESENT /HPF

## 2024-06-26 PROCEDURE — 74176 CT ABD & PELVIS W/O CONTRAST: CPT

## 2024-06-26 PROCEDURE — 81001 URINALYSIS AUTO W/SCOPE: CPT | Mod: 59 | Performed by: INTERNAL MEDICINE

## 2024-06-26 PROCEDURE — 2500000001 HC RX 250 WO HCPCS SELF ADMINISTERED DRUGS (ALT 637 FOR MEDICARE OP): Performed by: EMERGENCY MEDICINE

## 2024-06-26 PROCEDURE — 80307 DRUG TEST PRSMV CHEM ANLYZR: CPT | Performed by: INTERNAL MEDICINE

## 2024-06-26 PROCEDURE — 36415 COLL VENOUS BLD VENIPUNCTURE: CPT | Performed by: INTERNAL MEDICINE

## 2024-06-26 PROCEDURE — 84702 CHORIONIC GONADOTROPIN TEST: CPT | Performed by: INTERNAL MEDICINE

## 2024-06-26 PROCEDURE — 80053 COMPREHEN METABOLIC PANEL: CPT | Performed by: INTERNAL MEDICINE

## 2024-06-26 PROCEDURE — 80320 DRUG SCREEN QUANTALCOHOLS: CPT | Performed by: INTERNAL MEDICINE

## 2024-06-26 PROCEDURE — 93005 ELECTROCARDIOGRAM TRACING: CPT

## 2024-06-26 PROCEDURE — P9612 CATHETERIZE FOR URINE SPEC: HCPCS

## 2024-06-26 PROCEDURE — 85025 COMPLETE CBC W/AUTO DIFF WBC: CPT | Performed by: INTERNAL MEDICINE

## 2024-06-26 PROCEDURE — 99285 EMERGENCY DEPT VISIT HI MDM: CPT | Mod: 25

## 2024-06-26 PROCEDURE — 74176 CT ABD & PELVIS W/O CONTRAST: CPT | Performed by: RADIOLOGY

## 2024-06-26 RX ORDER — FLUCONAZOLE 100 MG/1
100 TABLET ORAL ONCE
Status: COMPLETED | OUTPATIENT
Start: 2024-06-26 | End: 2024-06-26

## 2024-06-26 RX ADMIN — FLUCONAZOLE 100 MG: 100 TABLET ORAL at 21:15

## 2024-06-26 SDOH — HEALTH STABILITY: MENTAL HEALTH: NEEDS EXPRESSED: DENIES

## 2024-06-26 SDOH — HEALTH STABILITY: MENTAL HEALTH: HAVE YOU HAD THESE THOUGHTS AND HAD SOME INTENTION OF ACTING ON THEM?: YES

## 2024-06-26 SDOH — HEALTH STABILITY: MENTAL HEALTH
HAVE YOU STARTED TO WORK OUT OR WORKED OUT THE DETAILS OF HOW TO KILL YOURSELF? DO YOU INTENT TO CARRY OUT THIS PLAN?: YES

## 2024-06-26 SDOH — HEALTH STABILITY: MENTAL HEALTH

## 2024-06-26 SDOH — HEALTH STABILITY: MENTAL HEALTH: DELUSIONS: CONTROLLED

## 2024-06-26 SDOH — HEALTH STABILITY: MENTAL HEALTH: HAVE YOU WISHED YOU WERE DEAD OR WISHED YOU COULD GO TO SLEEP AND NOT WAKE UP?: YES

## 2024-06-26 SDOH — SOCIAL STABILITY: SOCIAL INSECURITY: FAMILY BEHAVIORS: ANXIOUS

## 2024-06-26 SDOH — HEALTH STABILITY: MENTAL HEALTH: HAVE YOU ACTUALLY HAD ANY THOUGHTS OF KILLING YOURSELF?: YES

## 2024-06-26 SDOH — HEALTH STABILITY: MENTAL HEALTH: BEHAVIORS/MOOD: ANXIOUS

## 2024-06-26 SDOH — HEALTH STABILITY: MENTAL HEALTH: SLEEP PATTERN: UNABLE TO ASSESS

## 2024-06-26 SDOH — HEALTH STABILITY: MENTAL HEALTH: CONTENT: HYPERSEXUAL

## 2024-06-26 SDOH — HEALTH STABILITY: MENTAL HEALTH: BEHAVIORS/MOOD: CALM;COOPERATIVE

## 2024-06-26 SDOH — HEALTH STABILITY: MENTAL HEALTH: HALLUCINATION: UNABLE TO ASSESS

## 2024-06-26 SDOH — HEALTH STABILITY: MENTAL HEALTH: SUICIDE ASSESSMENT: ADULT (C-SSRS)

## 2024-06-26 SDOH — SOCIAL STABILITY: SOCIAL NETWORK: EMOTIONAL SUPPORT GIVEN: REASSURE

## 2024-06-26 SDOH — HEALTH STABILITY: MENTAL HEALTH: BEHAVIORS/MOOD: RESTLESS

## 2024-06-26 SDOH — HEALTH STABILITY: MENTAL HEALTH: CONTENT: UNREMARKABLE

## 2024-06-26 SDOH — HEALTH STABILITY: MENTAL HEALTH: HAVE YOU BEEN THINKING ABOUT HOW YOU MIGHT DO THIS?: YES

## 2024-06-26 SDOH — HEALTH STABILITY: MENTAL HEALTH: HAVE YOU EVER DONE ANYTHING, STARTED TO DO ANYTHING, OR PREPARED TO DO ANYTHING TO END YOUR LIFE?: NO

## 2024-06-26 SDOH — HEALTH STABILITY: MENTAL HEALTH: BEHAVIORS/MOOD: ANXIOUS;RESTLESS

## 2024-06-26 SDOH — HEALTH STABILITY: MENTAL HEALTH: BEHAVIORS/MOOD: COOPERATIVE;CALM

## 2024-06-26 ASSESSMENT — COLUMBIA-SUICIDE SEVERITY RATING SCALE - C-SSRS
2. HAVE YOU ACTUALLY HAD ANY THOUGHTS OF KILLING YOURSELF?: YES
4. HAVE YOU HAD THESE THOUGHTS AND HAD SOME INTENTION OF ACTING ON THEM?: YES
5. HAVE YOU STARTED TO WORK OUT OR WORKED OUT THE DETAILS OF HOW TO KILL YOURSELF? DO YOU INTEND TO CARRY OUT THIS PLAN?: YES
1. IN THE PAST MONTH, HAVE YOU WISHED YOU WERE DEAD OR WISHED YOU COULD GO TO SLEEP AND NOT WAKE UP?: YES
6. HAVE YOU EVER DONE ANYTHING, STARTED TO DO ANYTHING, OR PREPARED TO DO ANYTHING TO END YOUR LIFE?: NO
6. HAVE YOU EVER DONE ANYTHING, STARTED TO DO ANYTHING, OR PREPARED TO DO ANYTHING TO END YOUR LIFE?: YES

## 2024-06-26 ASSESSMENT — LIFESTYLE VARIABLES
EVER FELT BAD OR GUILTY ABOUT YOUR DRINKING: NO
TOTAL SCORE: 0
HAVE YOU EVER FELT YOU SHOULD CUT DOWN ON YOUR DRINKING: NO
EVER HAD A DRINK FIRST THING IN THE MORNING TO STEADY YOUR NERVES TO GET RID OF A HANGOVER: NO
HAVE PEOPLE ANNOYED YOU BY CRITICIZING YOUR DRINKING: NO

## 2024-06-26 ASSESSMENT — PAIN - FUNCTIONAL ASSESSMENT: PAIN_FUNCTIONAL_ASSESSMENT: 0-10

## 2024-06-26 ASSESSMENT — PAIN SCALES - GENERAL
PAINLEVEL_OUTOF10: 0 - NO PAIN

## 2024-06-26 NOTE — ED PROVIDER NOTES
"HPI   Chief Complaint   Patient presents with    Suicidal     Pt at group home making motions of \"putting gun to head and pulling trigger\" with hand. Pt nonverbal with trach on RA, ostomy bag, and peg tube. Staff also stated pt attempting to pull at tubes. Pt shakes head in yes fashion at thoughts of hurting herself. Pt calm/cooperative.        Patient presented for evaluation of abdominal pain and suicidal ideation.  Patient presents from a group home.  Patient has a history of cerebral palsy limiting history of present illness.  Patient essentially nonverbal.  Patient does nod yes and no to questioning.  Patient was reportedly pulling at her stoma earlier today.  Patient denies pulling at her J-tube.  Patient states she was having abdominal pain.  Denies vomiting.  Denies problems with her tracheostomy.  Patient does admit to having suicidal ideation.  Per EMS the patient reportedly made a gun motion to her head earlier at the nursing facility.      History provided by:  Patient and EMS personnel  History limited by:  Acuity of condition                      [place] Coma Scale Score: 11                     Patient History   Past Medical History:   Diagnosis Date    Essential (primary) hypertension     Hypertension, benign    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    S/P percutaneous endoscopic gastrostomy (PEG) tube placement    Moderate intellectual disabilities     Moderate intellectual disability    Personal history of diseases of the skin and subcutaneous tissue     History of atopic dermatitis    Personal history of other diseases of the circulatory system     History of cardiomegaly    Personal history of other diseases of the circulatory system     History of congestive heart failure    Personal history of other diseases of the digestive system     History of gastroesophageal reflux (GERD)    Personal history of other diseases of the respiratory system     History of tracheitis    Personal history of other " endocrine, nutritional and metabolic disease     History of vitamin D deficiency    Personal history of other mental and behavioral disorders     History of depression    Personal history of other specified conditions 08/10/2021    History of dysphagia    Personal history of urinary (tract) infections     History of urinary tract infection    Post-traumatic stress disorder, unspecified     PTSD (post-traumatic stress disorder)    Ulcer of esophagus without bleeding     Ulcerative esophagitis    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  Social History     Tobacco Use    Smoking status: Never    Smokeless tobacco: Never   Substance Use Topics    Alcohol use: Not on file    Drug use: Not on file       Physical Exam   ED Triage Vitals   Temp Pulse Resp BP   -- -- -- --      SpO2 Temp src Heart Rate Source Patient Position   -- -- -- --      BP Location FiO2 (%)     -- --       Physical Exam  Vitals and nursing note reviewed.   HENT:      Head: Atraumatic.      Right Ear: External ear normal.      Left Ear: External ear normal.      Nose: Nose normal.      Mouth/Throat:      Mouth: Mucous membranes are moist.   Eyes:      Extraocular Movements:      Right eye: Abnormal extraocular motion present.      Pupils: Pupils are equal, round, and reactive to light.      Comments: Internal strabismus of the right eye   Neck:     Cardiovascular:      Rate and Rhythm: Normal rate and regular rhythm.      Pulses: Normal pulses.   Pulmonary:      Effort: Pulmonary effort is normal.      Breath sounds: Examination of the right-lower field reveals decreased breath sounds. Examination of the left-lower field reveals decreased breath sounds.  Decreased breath sounds present.   Abdominal:      Palpations: Abdomen is soft.      Tenderness: There is no abdominal tenderness.   Musculoskeletal:         General: No tenderness.      Cervical back: No tenderness.      Comments: Chronic deformities and contractures to the bilateral upper and lower extremities consistent with cerebral palsy   Skin:     General: Skin is warm and dry.          Neurological:      Mental Status: She is alert.      Comments: Nonverbal.  Patient moves upper and lower extremities.   Psychiatric:         Thought Content: Thought content includes suicidal ideation.         ED Course & MDM   ED Course as of 24 Discussed with EPAT and we agree the patient is appropriate for outpatient follow-up. [JA]      ED Course User Index  [JA] Jabari Jones DO         Diagnoses as of 24   Abdominal pain, generalized   Suicidal ideation       Medical Decision Making  Differential diagnosis: Abdominal pain, UTI, suicidal ideation, other      Patient presented for evaluation of possible abdominal pain and no change to her stoma.  Patient has a history of cerebral palsy and has a trach in place.  Patient is nonverbal.  Patient had motion to her head this morning and when asked if she was having thoughts of suicide patient did not yes.  Discussed with EPAT and they feel the patient is appropriate for outpatient follow-up.  I agree.    Patient care transferred to oncoming physician pending urinalysis and CT abdomen        Procedure  Procedures    [id number] 17: 53 sinus tachycardia, rate 105, normal axis, ST segments normal, T waves normal, nonspecific EKG.  EKG interpreted by myself.     Jabari Jones DO  24
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
## 2024-06-27 VITALS
BODY MASS INDEX: 22.18 KG/M2 | RESPIRATION RATE: 16 BRPM | WEIGHT: 110 LBS | DIASTOLIC BLOOD PRESSURE: 86 MMHG | HEART RATE: 86 BPM | OXYGEN SATURATION: 96 % | HEIGHT: 59 IN | SYSTOLIC BLOOD PRESSURE: 128 MMHG | TEMPERATURE: 98.4 F

## 2024-06-27 ASSESSMENT — PAIN SCALES - GENERAL: PAINLEVEL_OUTOF10: 0 - NO PAIN

## 2024-06-27 NOTE — DISCHARGE INSTRUCTIONS
Follow-up with your primary care doctor.  Return to the emergency department if symptoms worsen or change.  Follow-up with the mental health resources provided to you today.

## 2024-06-27 NOTE — PROGRESS NOTES
"Emergency Medicine Transition of Care Note    I received Carolina Chowdhury in signout from Dr. Jones.  Please see the previous ED provider note for all HPI, PE and MDM up to the time of signout at 7 PM. This is in addition to the primary record.    In brief Carolina Chowdhury is an 39 y.o. female presenting for   Chief Complaint   Patient presents with    Suicidal     Pt at group home making motions of \"putting gun to head and pulling trigger\" with hand. Pt nonverbal with trach on RA, ostomy bag, and peg tube. Staff also stated pt attempting to pull at tubes. Pt shakes head in yes fashion at thoughts of hurting herself. Pt calm/cooperative.      At the time of signout we were awaiting: UA and likely discharge    ED Course as of 06/27/24 0016 Wed Jun 26, 2024 1901 Discussed with EPAT and we agree the patient is appropriate for outpatient follow-up. [JA]      ED Course User Index  [JA] Jabari Jones,          Diagnoses as of 06/27/24 0016   Abdominal pain, generalized   Suicidal ideation   Constipation, unspecified constipation type   Yeast detected       Medical Decision Making  39-year-old female presents to the emergency department with complaints of abdominal pain and concern for suicidal ideation.  Patient was signed out to me by previous provider pending urine studies.  Patient has been evaluated by EPAT and it is not felt that she would benefit from psychiatric admission.  I did review workup obtained by previous provider.  Labs are overall unremarkable aside from nonspecific leukocytosis which has been present in the past.  UA does not show findings of urinary tract infection, but was positive for yeast patient given one-time dose of Diflucan here in the ED.  CT imaging shows findings of constipation that has been present on previous studies patient is advised of this and has bowel regimen at care facility.  No other acute intra-abdominal processes appreciated such as bowel obstruction, bowel perforation, or " intra-abdominal infection.  Patient is advised of her workup and plan for discharge home.  Advised follow-up with primary care doctor and provided with mental health follow-up as well.  My exam of the patient she is lying in bed comfortably in no acute distress.  She is at her mental status baseline.  Heart RRR.  Lungs coarse bilaterally.  Gastrostomy tube is in place without surrounding erythema or warmth.  Patient's abdomen is soft without rebound, rigidity, guarding, distention.  Patient is moving all extremities without difficulty.        Final diagnoses:   [R10.84] Abdominal pain, generalized   [R45.851] Suicidal ideation   [K59.00] Constipation, unspecified constipation type   [B37.9] Yeast detected           Procedure  Procedures    Garrett Collins, DO

## 2024-06-29 LAB
ATRIAL RATE: 105 BPM
P AXIS: 54 DEGREES
P OFFSET: 202 MS
P ONSET: 154 MS
PR INTERVAL: 134 MS
Q ONSET: 221 MS
QRS COUNT: 17 BEATS
QRS DURATION: 72 MS
QT INTERVAL: 374 MS
QTC CALCULATION(BAZETT): 494 MS
QTC FREDERICIA: 450 MS
R AXIS: 60 DEGREES
T AXIS: 51 DEGREES
T OFFSET: 408 MS
VENTRICULAR RATE: 105 BPM

## 2024-07-23 ENCOUNTER — HOSPITAL ENCOUNTER (OUTPATIENT)
Dept: WOMENS IMAGING | Age: 39
Discharge: HOME | End: 2024-07-25
Payer: MEDICAID

## 2024-07-23 DIAGNOSIS — Z12.31 ENCOUNTER FOR SCREENING MAMMOGRAM FOR MALIGNANT NEOPLASM OF BREAST: ICD-10-CM

## 2024-07-23 PROCEDURE — 77063 BREAST TOMOSYNTHESIS BI: CPT

## 2024-08-02 ENCOUNTER — HOSPITAL ENCOUNTER (OUTPATIENT)
Age: 39
Setting detail: SPECIMEN
Discharge: HOME | End: 2024-08-02
Payer: MEDICAID

## 2024-08-02 LAB
BILIRUB UR QL STRIP: NEGATIVE
CLARITY UR: ABNORMAL
COLOR UR: YELLOW
GLUCOSE UR STRIP-MCNC: NEGATIVE MG/DL
HGB UR QL STRIP: NEGATIVE
KETONES UR STRIP-MCNC: NEGATIVE MG/DL
LEUKOCYTE ESTERASE UR QL STRIP: NEGATIVE
NITRITE UR QL STRIP: NEGATIVE
PH UR STRIP: 7.5 [PH] (ref 5–9)
PROT UR STRIP-MCNC: NEGATIVE MG/DL
SP GR UR STRIP: 1.03 (ref 1–1.03)
UROBILINOGEN UR STRIP-ACNC: 0.2 E.U./DL

## 2024-08-02 PROCEDURE — 87086 URINE CULTURE/COLONY COUNT: CPT

## 2024-08-02 PROCEDURE — 81003 URINALYSIS AUTO W/O SCOPE: CPT

## 2024-08-03 LAB — BACTERIA UR CULT: NORMAL

## 2024-08-15 ENCOUNTER — HOSPITAL ENCOUNTER (OUTPATIENT)
Dept: ULTRASOUND IMAGING | Age: 39
End: 2024-08-15
Payer: MEDICAID

## 2024-08-15 ENCOUNTER — HOSPITAL ENCOUNTER (OUTPATIENT)
Dept: WOMENS IMAGING | Age: 39
Discharge: HOME | End: 2024-08-17
Payer: MEDICAID

## 2024-08-15 DIAGNOSIS — R92.8 ABNORMAL MAMMOGRAM: ICD-10-CM

## 2024-08-15 PROCEDURE — 77065 DX MAMMO INCL CAD UNI: CPT

## 2024-08-21 ENCOUNTER — PREP FOR PROCEDURE (OUTPATIENT)
Dept: CARDIOTHORACIC SURGERY | Age: 39
End: 2024-08-21

## 2024-08-21 DIAGNOSIS — K94.23 PEG TUBE MALFUNCTION (HCC): ICD-10-CM

## 2024-08-22 RX ORDER — SODIUM CHLORIDE 0.9 % (FLUSH) 0.9 %
5-40 SYRINGE (ML) INJECTION PRN
Status: CANCELLED | OUTPATIENT
Start: 2024-08-29

## 2024-08-22 RX ORDER — SODIUM CHLORIDE 0.9 % (FLUSH) 0.9 %
5-40 SYRINGE (ML) INJECTION EVERY 12 HOURS SCHEDULED
Status: CANCELLED | OUTPATIENT
Start: 2024-08-29

## 2024-08-22 RX ORDER — SODIUM CHLORIDE 9 MG/ML
INJECTION, SOLUTION INTRAVENOUS PRN
Status: CANCELLED | OUTPATIENT
Start: 2024-08-29

## 2024-08-29 ENCOUNTER — HOSPITAL ENCOUNTER (OUTPATIENT)
Age: 39
Setting detail: OUTPATIENT SURGERY
Discharge: HOME | End: 2024-08-29
Attending: COLON & RECTAL SURGERY | Admitting: COLON & RECTAL SURGERY
Payer: MEDICAID

## 2024-08-29 ENCOUNTER — ANESTHESIA EVENT (OUTPATIENT)
Dept: OPERATING ROOM | Age: 39
End: 2024-08-29
Payer: MEDICAID

## 2024-08-29 ENCOUNTER — ANESTHESIA (OUTPATIENT)
Dept: OPERATING ROOM | Age: 39
End: 2024-08-29
Payer: MEDICAID

## 2024-08-29 VITALS
TEMPERATURE: 97 F | OXYGEN SATURATION: 96 % | RESPIRATION RATE: 20 BRPM | SYSTOLIC BLOOD PRESSURE: 127 MMHG | DIASTOLIC BLOOD PRESSURE: 72 MMHG | HEART RATE: 90 BPM

## 2024-08-29 LAB
HCG, URINE, POC: NEGATIVE
Lab: NORMAL
NEGATIVE QC PASS/FAIL: NORMAL
POSITIVE QC PASS/FAIL: NORMAL

## 2024-08-29 PROCEDURE — 7100000000 HC PACU RECOVERY - FIRST 15 MIN: Performed by: COLON & RECTAL SURGERY

## 2024-08-29 PROCEDURE — 2709999900 HC NON-CHARGEABLE SUPPLY: Performed by: COLON & RECTAL SURGERY

## 2024-08-29 PROCEDURE — 2580000003 HC RX 258: Performed by: COLON & RECTAL SURGERY

## 2024-08-29 PROCEDURE — 7100000010 HC PHASE II RECOVERY - FIRST 15 MIN: Performed by: COLON & RECTAL SURGERY

## 2024-08-29 PROCEDURE — 3609013300 HC EGD TUBE PLACEMENT: Performed by: COLON & RECTAL SURGERY

## 2024-08-29 PROCEDURE — 43246 EGD PLACE GASTROSTOMY TUBE: CPT | Performed by: COLON & RECTAL SURGERY

## 2024-08-29 PROCEDURE — 2580000003 HC RX 258: Performed by: ANESTHESIOLOGY

## 2024-08-29 PROCEDURE — 3700000000 HC ANESTHESIA ATTENDED CARE: Performed by: COLON & RECTAL SURGERY

## 2024-08-29 PROCEDURE — 2500000003 HC RX 250 WO HCPCS: Performed by: NURSE ANESTHETIST, CERTIFIED REGISTERED

## 2024-08-29 PROCEDURE — 6360000002 HC RX W HCPCS: Performed by: NURSE ANESTHETIST, CERTIFIED REGISTERED

## 2024-08-29 PROCEDURE — 7100000011 HC PHASE II RECOVERY - ADDTL 15 MIN: Performed by: COLON & RECTAL SURGERY

## 2024-08-29 RX ORDER — MEPERIDINE HYDROCHLORIDE 25 MG/ML
12.5 INJECTION INTRAMUSCULAR; INTRAVENOUS; SUBCUTANEOUS
Status: DISCONTINUED | OUTPATIENT
Start: 2024-08-29 | End: 2024-08-29 | Stop reason: HOSPADM

## 2024-08-29 RX ORDER — SODIUM CHLORIDE 0.9 % (FLUSH) 0.9 %
5-40 SYRINGE (ML) INJECTION EVERY 12 HOURS SCHEDULED
Status: DISCONTINUED | OUTPATIENT
Start: 2024-08-29 | End: 2024-08-29 | Stop reason: HOSPADM

## 2024-08-29 RX ORDER — SODIUM CHLORIDE 9 MG/ML
INJECTION, SOLUTION INTRAVENOUS PRN
Status: DISCONTINUED | OUTPATIENT
Start: 2024-08-29 | End: 2024-08-29 | Stop reason: HOSPADM

## 2024-08-29 RX ORDER — FENTANYL CITRATE 0.05 MG/ML
50 INJECTION, SOLUTION INTRAMUSCULAR; INTRAVENOUS EVERY 10 MIN PRN
Status: DISCONTINUED | OUTPATIENT
Start: 2024-08-29 | End: 2024-08-29 | Stop reason: HOSPADM

## 2024-08-29 RX ORDER — LIDOCAINE HYDROCHLORIDE 10 MG/ML
1 INJECTION, SOLUTION EPIDURAL; INFILTRATION; INTRACAUDAL; PERINEURAL
Status: DISCONTINUED | OUTPATIENT
Start: 2024-08-29 | End: 2024-08-29 | Stop reason: HOSPADM

## 2024-08-29 RX ORDER — LIDOCAINE HYDROCHLORIDE 10 MG/ML
INJECTION, SOLUTION EPIDURAL; INFILTRATION; INTRACAUDAL; PERINEURAL PRN
Status: DISCONTINUED | OUTPATIENT
Start: 2024-08-29 | End: 2024-08-29 | Stop reason: SDUPTHER

## 2024-08-29 RX ORDER — OXYCODONE HYDROCHLORIDE 5 MG/1
5 TABLET ORAL
Status: DISCONTINUED | OUTPATIENT
Start: 2024-08-29 | End: 2024-08-29 | Stop reason: HOSPADM

## 2024-08-29 RX ORDER — NALOXONE HYDROCHLORIDE 0.4 MG/ML
INJECTION, SOLUTION INTRAMUSCULAR; INTRAVENOUS; SUBCUTANEOUS PRN
Status: DISCONTINUED | OUTPATIENT
Start: 2024-08-29 | End: 2024-08-29 | Stop reason: HOSPADM

## 2024-08-29 RX ORDER — SODIUM CHLORIDE 9 MG/ML
INJECTION, SOLUTION INTRAVENOUS CONTINUOUS
Status: DISCONTINUED | OUTPATIENT
Start: 2024-08-29 | End: 2024-08-29 | Stop reason: HOSPADM

## 2024-08-29 RX ORDER — DIPHENHYDRAMINE HYDROCHLORIDE 50 MG/ML
12.5 INJECTION INTRAMUSCULAR; INTRAVENOUS
Status: DISCONTINUED | OUTPATIENT
Start: 2024-08-29 | End: 2024-08-29 | Stop reason: HOSPADM

## 2024-08-29 RX ORDER — SODIUM CHLORIDE 0.9 % (FLUSH) 0.9 %
5-40 SYRINGE (ML) INJECTION PRN
Status: DISCONTINUED | OUTPATIENT
Start: 2024-08-29 | End: 2024-08-29 | Stop reason: HOSPADM

## 2024-08-29 RX ORDER — PROPOFOL 10 MG/ML
INJECTION, EMULSION INTRAVENOUS PRN
Status: DISCONTINUED | OUTPATIENT
Start: 2024-08-29 | End: 2024-08-29 | Stop reason: SDUPTHER

## 2024-08-29 RX ORDER — METOCLOPRAMIDE HYDROCHLORIDE 5 MG/ML
10 INJECTION INTRAMUSCULAR; INTRAVENOUS
Status: DISCONTINUED | OUTPATIENT
Start: 2024-08-29 | End: 2024-08-29 | Stop reason: HOSPADM

## 2024-08-29 RX ADMIN — PROPOFOL 70 MG: 10 INJECTION, EMULSION INTRAVENOUS at 09:12

## 2024-08-29 RX ADMIN — LIDOCAINE HYDROCHLORIDE 50 MG: 10 INJECTION, SOLUTION EPIDURAL; INFILTRATION; INTRACAUDAL; PERINEURAL at 09:15

## 2024-08-29 RX ADMIN — SODIUM CHLORIDE 1000 ML: 9 INJECTION, SOLUTION INTRAVENOUS at 08:30

## 2024-08-29 ASSESSMENT — PAIN - FUNCTIONAL ASSESSMENT: PAIN_FUNCTIONAL_ASSESSMENT: 0-10

## 2024-08-29 NOTE — ANESTHESIA PRE PROCEDURE
Department of Anesthesiology  Preprocedure Note       Name:  Edgar Sanchez   Age:  39 y.o.  :  1985                                          MRN:  [id number]         Date:  2024      Surgeon: Surgeon(s):  Carlos Jay MD    Procedure: Procedure(s):  peg tube replacement, phone pat 24  (coming from [facility] [place]) [phone] will arrive at 5:45    Medications prior to admission:   Prior to Admission medications    Medication Sig Start Date End Date Taking? Authorizing Provider   clonazePAM (KLONOPIN) 0.5 MG tablet Take 1 tablet by mouth in the morning and 1 tablet in the evening.    ProviderDom MD   cimetidine (TAGAMET) 400 MG tablet  23   Dom Sherman MD   guaiFENesin (GAMAL-TUSSIN) 100 MG/5ML liquid  23   Dom Sherman MD   OLANZapine (ZYPREXA) 2.5 MG tablet Take 2.5 tablets by mouth 2 times daily 23   Dom Sehrman MD   omeprazole (PRILOSEC) 20 MG delayed release capsule  22   Dom Sherman MD   acetylcysteine 600 MG CAPS Take 600 mg by mouth 2 times daily    Dom Sherman MD   metoprolol tartrate (LOPRESSOR) 25 MG tablet Take 0.5 tablets by mouth 2 times daily    Dom Sherman MD   hydrOXYzine (ATARAX) 10 MG/5ML syrup 5 mLs by Per G Tube route 3 times daily    Dom Sherman MD   FLUoxetine (PROZAC) 20 MG/5ML solution Take 10 mLs by mouth daily 21   Charlotte Louis DO   albuterol (PROVENTIL) (2.5 MG/3ML) 0.083% nebulizer solution Take 3 mLs by nebulization 4 times daily    Dom Sherman MD   budesonide (PULMICORT) 0.5 MG/2ML nebulizer suspension Take 2 mLs by nebulization 2 times daily    Dom Sherman MD   ipratropium (ATROVENT) 0.02 % nebulizer solution Take 2.5 mLs by nebulization 4 times daily    Dom Sherman MD   metoclopramide (REGLAN) 10 MG tablet 1 tablet by Per G Tube route three times daily    Dom Sherman MD   Cholecalciferol (VITAMIN D3) 50 MCG (2000)

## 2024-08-29 NOTE — ANESTHESIA POSTPROCEDURE EVALUATION
Department of Anesthesiology  Postprocedure Note    Patient: Edgar Sanchez  MRN: [id number]  YOB: 1985  Date of evaluation: 8/29/2024    Procedure Summary       Date: 08/29/24 Room / Location: [address]    Anesthesia Start: 0909 Anesthesia Stop: 0919    Procedure: peg tube replacement, phone pat 8/27/24  (coming from [facility]) [phone] will arrive at 5:45 Diagnosis:       PEG tube malfunction (HCC)      (PEG tube malfunction (HCC) [K94.23])    Surgeons: Carlos Jay MD Responsible Provider: Brian Rivera MD    Anesthesia Type: MAC ASA Status: 3            Anesthesia Type: No value filed.    Mili Phase I: Mili Score: 10    Mili Phase II:      Anesthesia Post Evaluation    Patient location during evaluation: bedside  Patient participation: complete - patient participated  Level of consciousness: awake  Airway patency: patent  Nausea & Vomiting: no nausea and no vomiting  Cardiovascular status: bradycardic  Respiratory status: acceptable  Hydration status: euvolemic  Pain management: adequate        No notable events documented.

## 2024-08-29 NOTE — H&P
Department of General Surgery - Adult  Surgical Service general surgery  Attending admit note           CHIEF COMPLAINT: Malfunctioning gastrostomy tube     History Obtained From:  non-family caregiver -caregivers, electronic medical record     HISTORY OF PRESENT ILLNESS:                 The patient is a 38 y.o. female who presents with malfunctioning previously dislodged gastrostomy tube.  Patient requires replacement with bumper and gastrostomy tube for more durable feeding.     Consent obtained for EGD with replacement of gastrostomy tube.    History reviewed.  She currently has a replacement Mann catheter in place as a nondurable gastrostomy tube.     Past Medical History:    Past Medical History            Diagnosis Date    Bronchitis      Cerebral palsy (HCC)      CHF (congestive heart failure) (HCC)      Dysphagia      GERD (gastroesophageal reflux disease)      Hypokalemia           Past Surgical History:    Past Surgical History             Procedure Laterality Date    GASTROSTOMY TUBE PLACEMENT N/A 1/26/2023     EGD ESOPHAGOGASTRODUODENOSCOPY PEG TUBE INSERTION performed by Carlos Jay MD at [facility] OR    TRACHEOSTOMY             Current Medications:     Current Hospital Medications   Current Facility-Administered Medications: 0.9 % sodium chloride infusion, , IntraVENous, Continuous  lidocaine PF 1 % injection 1 mL, 1 mL, IntraDERmal, Once PRN  sodium chloride flush 0.9 % injection 5-40 mL, 5-40 mL, IntraVENous, 2 times per day  sodium chloride flush 0.9 % injection 5-40 mL, 5-40 mL, IntraVENous, PRN  0.9 % sodium chloride infusion, , IntraVENous, PRN     Allergies:  Amoxicillin-pot clavulanate, Ibuprofen, Penicillins, Ondansetron, Vancomycin, Amoxicillin, Eggs-apples-oats [alimentum], Food, Milk-related compounds, Orange juice [orange oil], and Strawberry (diagnostic)     Social History:   TOBACCO:   reports that she has never smoked. She has never used smokeless tobacco.  ETOH:   has no history

## 2024-08-29 NOTE — DISCHARGE INSTRUCTIONS
May use PEG tube for medications and tube feeding as desired      [facility]  Outpatient Discharge Instructions    To continue your care at home, please follow the instructions below and any additional discharge instructions given to you by your physician.    GENERAL ANESTHESIA:  Do not drive or operate machinery for 24hrs after discharge,  Do not drink alcohol, take tranquilizers, sleeping medication, or any other medication not directly instructed by your physician,   Do not make any important decisions or sign any legal documents for 24hrs after surgery,  Have someone with you for 24hrs after surgery to assist you as needed.    ACTIVITY:  Light activity for 24hrs,  No heavy lifting or exercise until instructed by your physician,  You may resume normal activities once instructed by your physician,  Special Instruction: ___________________________________________________________________    FLUIDS AND DIET:  An upset stomach or feeling sick (nausea) can commonly occur after surgery and/or pain medication use. To help minimize nausea:  Do not eat a heavy meal soon after your surgery,    Start with water or other clear liquids,  Advance to mild or bland items like Jell-O, dry toast, crackers, etc.,  Avoid caffeine,  Do not drink alcohol for at least 24 hours after surgery,  Your physician may prescribe anti-nausea medication if your nausea continues,  If you are free from nausea for 24hrs, you can advance to your normal diet as tolerated.    OPERATIVE SITE:  A small amount of bleeding or drainage after surgery is normal. Your physician will provide you with specific instructions on how to care for your surgical site and/or dressing.   Try not to touch your surgical site unless necessary,   Always wash your hands BEFORE and AFTER changing your dressing if instructed by your physician,   Proper handwashing includes wetting your hands with clean water, applying soap, lathering your hands by rubbing them together  with soap for 20 seconds, rinsing them with clean water, and drying them with a clean towel. If soap and water is not available, alcohol-based  may be applied by rubbing the hands together and allowing them to dry for 20 seconds.  Special Instructions: __________________________________________________________________      PAIN:  Pain after surgery is normal and should be expected. When you go home, the anesthesia wears off, and you may experience increased discomfort. Your provider will give you specific instructions on what pain medication to take at home. Listed below is additional information on treating pain after surgery:  Pain medication can give you an upset stomach. Unless your provider has instructed you not to eat or drink, the pain medication should be taken with a small amount of food. Eating will decrease the chance of an upset stomach.  Pain medication can take about 20-30 minutes to start working, so do not wait until the pain worsens before taking a dose. Remember, always take over-the-counter and prescription drugs only as directed by your provider.  Unless otherwise instructed by your provider, applying ice on or around your surgical site can be a great way to help minimize pain and swelling after surgery.  Apply ice to the affected area a minimum of 4 times daily for no longer than 15-20 minutes at a time. It is very important to protect your skin by NOT applying ice or ice pack directly to your skin. Always have a towel or pillow case between your skin and the ice. Frozen vegetable packs like peas or corn make great inexpensive alternatives for use as an icepack.    FOLLOW UP CARE - Call your Physician if any of the following occur:  Increased swelling, redness, warmth, hardness around operative area,  Blood soaked dressings (small amounts of oozing may be normal),  Numb, tingling, or cold fingers or toes (for surgeries on extremities)  Fever over 101° F,  Increased drainage, puss,

## 2024-11-15 ENCOUNTER — APPOINTMENT (OUTPATIENT)
Dept: RADIOLOGY | Facility: HOSPITAL | Age: 39
End: 2024-11-15
Payer: MEDICAID

## 2024-11-15 ENCOUNTER — HOSPITAL ENCOUNTER (OUTPATIENT)
Facility: HOSPITAL | Age: 39
Setting detail: OBSERVATION
End: 2024-11-15
Attending: STUDENT IN AN ORGANIZED HEALTH CARE EDUCATION/TRAINING PROGRAM | Admitting: INTERNAL MEDICINE
Payer: MEDICAID

## 2024-11-15 DIAGNOSIS — F79 INTELLECTUAL DISABILITY: ICD-10-CM

## 2024-11-15 DIAGNOSIS — R56.9 SEIZURE (MULTI): ICD-10-CM

## 2024-11-15 DIAGNOSIS — Z43.0 TRACHEOSTOMY CARE (MULTI): Primary | ICD-10-CM

## 2024-11-15 PROBLEM — Z78.9 SOCIAL WORKER INVOLVED IN PATIENT'S CARE: Status: ACTIVE | Noted: 2024-11-15

## 2024-11-15 LAB
ALBUMIN SERPL BCP-MCNC: 3.6 G/DL (ref 3.4–5)
ALP SERPL-CCNC: 69 U/L (ref 33–110)
ALT SERPL W P-5'-P-CCNC: 11 U/L (ref 7–45)
ANION GAP SERPL CALC-SCNC: 11 MMOL/L (ref 10–20)
AST SERPL W P-5'-P-CCNC: 13 U/L (ref 9–39)
BASOPHILS # BLD AUTO: 0.04 X10*3/UL (ref 0–0.1)
BASOPHILS NFR BLD AUTO: 0.3 %
BILIRUB SERPL-MCNC: 0.3 MG/DL (ref 0–1.2)
BUN SERPL-MCNC: 12 MG/DL (ref 6–23)
CALCIUM SERPL-MCNC: 9.2 MG/DL (ref 8.6–10.3)
CHLORIDE SERPL-SCNC: 103 MMOL/L (ref 98–107)
CO2 SERPL-SCNC: 28 MMOL/L (ref 21–32)
CREAT SERPL-MCNC: 0.59 MG/DL (ref 0.5–1.05)
EGFRCR SERPLBLD CKD-EPI 2021: >90 ML/MIN/1.73M*2
EOSINOPHIL # BLD AUTO: 0.24 X10*3/UL (ref 0–0.7)
EOSINOPHIL NFR BLD AUTO: 2.1 %
ERYTHROCYTE [DISTWIDTH] IN BLOOD BY AUTOMATED COUNT: 12.2 % (ref 11.5–14.5)
FLUAV RNA RESP QL NAA+PROBE: NOT DETECTED
FLUBV RNA RESP QL NAA+PROBE: NOT DETECTED
GLUCOSE SERPL-MCNC: 84 MG/DL (ref 74–99)
HCT VFR BLD AUTO: 41 % (ref 36–46)
HGB BLD-MCNC: 13.4 G/DL (ref 12–16)
IMM GRANULOCYTES # BLD AUTO: 0.03 X10*3/UL (ref 0–0.7)
IMM GRANULOCYTES NFR BLD AUTO: 0.3 % (ref 0–0.9)
LYMPHOCYTES # BLD AUTO: 3.04 X10*3/UL (ref 1.2–4.8)
LYMPHOCYTES NFR BLD AUTO: 26.3 %
MCH RBC QN AUTO: 31.2 PG (ref 26–34)
MCHC RBC AUTO-ENTMCNC: 32.7 G/DL (ref 32–36)
MCV RBC AUTO: 96 FL (ref 80–100)
MONOCYTES # BLD AUTO: 0.69 X10*3/UL (ref 0.1–1)
MONOCYTES NFR BLD AUTO: 6 %
NEUTROPHILS # BLD AUTO: 7.52 X10*3/UL (ref 1.2–7.7)
NEUTROPHILS NFR BLD AUTO: 65 %
NRBC BLD-RTO: 0 /100 WBCS (ref 0–0)
PLATELET # BLD AUTO: 377 X10*3/UL (ref 150–450)
POTASSIUM SERPL-SCNC: 3.8 MMOL/L (ref 3.5–5.3)
PROT SERPL-MCNC: 6.7 G/DL (ref 6.4–8.2)
RBC # BLD AUTO: 4.29 X10*6/UL (ref 4–5.2)
SARS-COV-2 RNA RESP QL NAA+PROBE: NOT DETECTED
SODIUM SERPL-SCNC: 138 MMOL/L (ref 136–145)
WBC # BLD AUTO: 11.6 X10*3/UL (ref 4.4–11.3)

## 2024-11-15 PROCEDURE — 99222 1ST HOSP IP/OBS MODERATE 55: CPT | Performed by: INTERNAL MEDICINE

## 2024-11-15 PROCEDURE — G0378 HOSPITAL OBSERVATION PER HR: HCPCS

## 2024-11-15 PROCEDURE — 36415 COLL VENOUS BLD VENIPUNCTURE: CPT | Performed by: STUDENT IN AN ORGANIZED HEALTH CARE EDUCATION/TRAINING PROGRAM

## 2024-11-15 PROCEDURE — 71045 X-RAY EXAM CHEST 1 VIEW: CPT | Mod: FOREIGN READ | Performed by: RADIOLOGY

## 2024-11-15 PROCEDURE — 85025 COMPLETE CBC W/AUTO DIFF WBC: CPT | Performed by: STUDENT IN AN ORGANIZED HEALTH CARE EDUCATION/TRAINING PROGRAM

## 2024-11-15 PROCEDURE — 87636 SARSCOV2 & INF A&B AMP PRB: CPT | Performed by: STUDENT IN AN ORGANIZED HEALTH CARE EDUCATION/TRAINING PROGRAM

## 2024-11-15 PROCEDURE — 99285 EMERGENCY DEPT VISIT HI MDM: CPT

## 2024-11-15 PROCEDURE — 71045 X-RAY EXAM CHEST 1 VIEW: CPT

## 2024-11-15 PROCEDURE — 80053 COMPREHEN METABOLIC PANEL: CPT | Performed by: STUDENT IN AN ORGANIZED HEALTH CARE EDUCATION/TRAINING PROGRAM

## 2024-11-15 RX ORDER — POLYETHYLENE GLYCOL 3350 17 G/17G
17 POWDER, FOR SOLUTION ORAL DAILY
Status: DISPENSED | OUTPATIENT
Start: 2024-11-15

## 2024-11-15 ASSESSMENT — COGNITIVE AND FUNCTIONAL STATUS - GENERAL
MOVING TO AND FROM BED TO CHAIR: A LITTLE
MOBILITY SCORE: 18
PERSONAL GROOMING: A LITTLE
DRESSING REGULAR UPPER BODY CLOTHING: A LITTLE
WALKING IN HOSPITAL ROOM: A LOT
CLIMB 3 TO 5 STEPS WITH RAILING: A LOT
HELP NEEDED FOR BATHING: A LITTLE
EATING MEALS: A LITTLE
STANDING UP FROM CHAIR USING ARMS: A LITTLE
DAILY ACTIVITIY SCORE: 18
TOILETING: A LITTLE
DRESSING REGULAR LOWER BODY CLOTHING: A LITTLE

## 2024-11-15 ASSESSMENT — PAIN SCALES - WONG BAKER: WONGBAKER_NUMERICALRESPONSE: HURTS LITTLE BIT

## 2024-11-15 ASSESSMENT — LIFESTYLE VARIABLES
EVER FELT BAD OR GUILTY ABOUT YOUR DRINKING: NO
HAVE PEOPLE ANNOYED YOU BY CRITICIZING YOUR DRINKING: NO
EVER HAD A DRINK FIRST THING IN THE MORNING TO STEADY YOUR NERVES TO GET RID OF A HANGOVER: NO
TOTAL SCORE: 0
HAVE YOU EVER FELT YOU SHOULD CUT DOWN ON YOUR DRINKING: NO

## 2024-11-15 ASSESSMENT — PAIN - FUNCTIONAL ASSESSMENT: PAIN_FUNCTIONAL_ASSESSMENT: WONG-BAKER FACES

## 2024-11-15 NOTE — ED PROVIDER NOTES
HPI   Chief Complaint   Patient presents with    Increased secretions     Per the group home, pt has increased secretions in her trach. Pt is non verbal. Per group home pt has hx of pneumonia       Patient is a 39-year-old female with history of hypertension, intellectual disability, PEG tube, PTSD, GERD who presents for increased tracheostomy output.  Patient was sent by her group home.  Patient is nonverbal.  No reported fevers, chills.  Patient does not provide significant history.  She does not keep that she feels mildly cold and would like to eat.              Patient History   Past Medical History:   Diagnosis Date    Essential (primary) hypertension     Hypertension, benign    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    S/P percutaneous endoscopic gastrostomy (PEG) tube placement    Moderate intellectual disabilities     Moderate intellectual disability    Personal history of diseases of the skin and subcutaneous tissue     History of atopic dermatitis    Personal history of other diseases of the circulatory system     History of cardiomegaly    Personal history of other diseases of the circulatory system     History of congestive heart failure    Personal history of other diseases of the digestive system     History of gastroesophageal reflux (GERD)    Personal history of other diseases of the respiratory system     History of tracheitis    Personal history of other endocrine, nutritional and metabolic disease     History of vitamin D deficiency    Personal history of other mental and behavioral disorders     History of depression    Personal history of other specified conditions 08/10/2021    History of dysphagia    Personal history of urinary (tract) infections     History of urinary tract infection    Post-traumatic stress disorder, unspecified     PTSD (post-traumatic stress disorder)    Ulcer of esophagus without bleeding     Ulcerative esophagitis    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  Social History     Tobacco Use    Smoking status: Never    Smokeless tobacco: Never   Substance Use Topics    Alcohol use: Not on file    Drug use: Not on file       Physical Exam   ED Triage Vitals [11/15/24 1552]   Temperature Heart Rate Respirations BP   36.3 °C (97.3 °F) 85 20 141/74      Pulse Ox Temp Source Heart Rate Source Patient Position   98 % Temporal Monitor --      BP Location FiO2 (%)     -- --       Physical Exam  Constitutional:       General: She is not in acute distress.  HENT:      Head: Normocephalic.   Eyes:      Extraocular Movements: Extraocular movements intact.      Conjunctiva/sclera: Conjunctivae normal.      Pupils: Pupils are equal, round, and reactive to light.   Neck:      Comments: Tracheostomy site with cannula in place, no bleeding, pus, output.  Cardiovascular:      Rate and Rhythm: Normal rate and regular rhythm.      Pulses: Normal pulses.      Heart sounds: Normal heart sounds.   Pulmonary:      Effort: Pulmonary effort is normal.      Breath sounds: Normal breath sounds.   Abdominal:      General: There is no distension.      Palpations: Abdomen is soft. There is no mass.      Tenderness: There is no abdominal tenderness. There is no guarding.      Comments: PEG tube in place with appropriate paring site, ostomy bag in place with appropriate drain output.   Musculoskeletal:         General: No deformity.      Cervical back: Normal range of motion and neck supple.      Right lower leg: No edema.      Left lower leg: No edema.   Skin:     General: Skin is warm and dry.      Findings: No lesion or rash.   Neurological:      General: No focal deficit present.      Mental Status: She is alert.  Mental status is at baseline.      Cranial Nerves: No cranial nerve deficit.      Sensory: No sensory deficit.      Motor: No weakness.   Psychiatric:         Mood and Affect: Mood normal.           ED Course & MDM   Diagnoses as of 11/16/24 0954   Tracheostomy care (Multi)   Intellectual disability                 No data recorded     [place] Coma Scale Score: 15 (11/15/24 1554 : Vicky Borrego, LALITA)                           Medical Decision Making  Patient is a 39-year-old female with above-stated past medical history who presents for increased tracheostomy output.  Patient's CMP is grossly unremarkable.  CBC shows a borderline leukocytosis, no anemia.  Influenza and COVID are negative.  Lungs are clear sounding and chest x-ray my review did not show signs of pneumonia or pneumothorax, this is confirmed radiology.  Patient has no secretions noted at her tracheostomy site and it appears appropriate.  I have low suspicion for an acute infection or complication with the tracheostomy given her workup and physical exam and vital signs.  Patient's group home was contacted by social work who was informed that the group home does not have appropriate nursing staff to care for the patient overnight given her tracheostomy status.  Therefore, patient's case was discussed with the medicine service who accepted the patient for admission.    Disclaimer: This note was dictated using speech recognition software. Minor errors in transcription may be present. Please call if questions.     Sebastian Redmond MD  [facility]  Contact on [employer]        Problems Addressed:  Intellectual disability: chronic illness or injury  Tracheostomy care (Multi): acute illness or injury    Amount and/or Complexity of Data Reviewed  Labs: ordered.  Radiology: ordered and independent interpretation performed.        Procedure  Procedures     Sebastian Redmond MD  11/16/24 8866

## 2024-11-15 NOTE — CONSULTS
Social Work Note  The LSW was contacted by the [facility] nurse manager re: the pt is admitted from [facility]. The [facility] nurse manager is Kaelyn, who provided her cell number [phone] to discuss that the group home is not able to provide the necessary trach care because of a nursing shortage. The group home has lost 2 nurses with no night nurse to provide the pt's 24/7 care. The LSW spoke with the [facility] nurse manager and  that the  Sherry Saha and Manuela Cao have been working on placement of the pt. Most recently a referral has been sent to the [facility] per the . The pt also was having increased trach secretions and prone to pneumonia.   The LSW has contacted the [facility] 24/7 line at [phone] for the pt's 's contact information. That 24/7 line is for emergencies and the pt's  is not available through the 24/7 line. The 24/7 line provided a [phone] general phone number during business hours.   [facility] is stating that they are unable to provide the proper medical care to the pt at this time. The LSW spoke with the ER MD and pt is to be admitted.

## 2024-11-15 NOTE — H&P
History Of Present Illness  Carolina Chowdhury is a 39 y.o. female  with  past medical history of MRDD, CP, trach since birth, PEG tube and colostomy and is nonverbal but nods head in yes or no fashion to questions, sent to the hospital with increased secretions in her trach.  Patient is not able to provide any significant history but able to communicate with nodding her head and hand gestures.  She denies any specific complaints at this time denies any chest pain shortness of breath nausea vomiting or any abdominal pain.  She is requesting for her tube feeds with hand gestures.  Workup in ED including lab values chest x-ray were all unremarkable.  Influenza and COVID were negative.  Per ED the patient is here frequently and she is at her baseline.  ED did try to send the patient back to the group home but the group home does not have staff at this time.  Due to lack of staff availability the patient is admitted under observation with involvement of social work for possible placement tomorrow.     Past Medical History  Past Medical History:   Diagnosis Date    Essential (primary) hypertension     Hypertension, benign    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    S/P percutaneous endoscopic gastrostomy (PEG) tube placement    Moderate intellectual disabilities     Moderate intellectual disability    Personal history of diseases of the skin and subcutaneous tissue     History of atopic dermatitis    Personal history of other diseases of the circulatory system     History of cardiomegaly    Personal history of other diseases of the circulatory system     History of congestive heart failure    Personal history of other diseases of the digestive system     History of gastroesophageal reflux (GERD)    Personal history of other diseases of the respiratory system     History of tracheitis    Personal history of other endocrine, nutritional and metabolic disease     History of vitamin D deficiency    Personal history of other  mental and behavioral disorders     History of depression    Personal history of other specified conditions 08/10/2021    History of dysphagia    Personal history of urinary (tract) infections     History of urinary tract infection    Post-traumatic stress disorder, unspecified     PTSD (post-traumatic stress disorder)    Ulcer of esophagus without bleeding     Ulcerative esophagitis    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 She has never used smokeless tobacco. No history on file for alcohol use and drug use.    Family History  No family history on file.     Allergies  Amoxicillin, Augmentin [amoxicillin-pot clavulanate], Eggs-apples-oats [nutritional supplement-fiber], Ibuprofen, Milk, Orange juice, Penicillin, Strawberry, and Zofran [ondansetron hcl]    Review of Systems   Unable to perform ROS: Patient nonverbal        Physical Exam  HENT:      Head: Normocephalic and atraumatic.      Nose: Nose normal.      Mouth/Throat:      Mouth: Mucous membranes are dry.   Eyes:      Extraocular Movements: Extraocular movements intact.      Conjunctiva/sclera: Conjunctivae normal.   Cardiovascular:      Rate and Rhythm: Normal rate and regular rhythm.      Pulses: Normal pulses.      Heart sounds: Normal heart sounds.   Pulmonary:      Effort: Pulmonary effort is normal.      Breath sounds: Normal breath sounds.      Comments: Tracheostomy in place  Abdominal:      General: Bowel sounds are normal.      Palpations: Abdomen is soft.      Comments: Colostomy in place   Musculoskeletal:         General: Normal range of motion.      Cervical back: Normal range of motion and neck  "supple.   Skin:     General: Skin is warm and dry.      Findings: Lesion present.   Neurological:      General: No focal deficit present.      Mental Status: She is alert. Mental status is at baseline.   Psychiatric:         Mood and Affect: Mood normal.            Last Recorded Vitals  Blood pressure 141/74, pulse 85, temperature 36.3 °C (97.3 °F), temperature source Temporal, resp. rate 20, height 1.575 m (5' 2\"), weight 49.9 kg (110 lb), SpO2 98%.    Relevant Results        Carolina Chowdhury is a 39 y.o. female  with  past medical history of MRDD, CP, trach since birth, PEG tube and colostomy and is nonverbal but nods head in yes or no fashion to questions, sent to the hospital from group home for possible increased secretions from her tracheostomy.  Workup in the hospital came back negative.  The patient was not able to go back to her group home due to lack of staff availability.  Social work is involved patient is admitted under observation for placement.     Assessment/Plan   Assessment & Plan  Tracheostomy care (Multi)     involved in patient's care      Assessment   chronic hypoxic respiratory failure status post tracheostomy  MRDD   Cerebral palsy  Alternating exotropia   Bilateral strabismus  Tracheostomy since birth   GERD   Muscle spasms, ulcerative proctitis and esophagitis history   History of folliculitis, topical dermatitis and onychomycosis  History of vitamin D deficiency, history of GI bleed   History of cardiomegaly with mild pulmonary venous congestion   History of CHF   History of PTSD and depression    Plan  Resume tube feeds as patient takes at home   Tracheostomy care and suctioning per nursing protocol.  Consult RT for assistance in management    Oxygen as needed to maintain O2 saturations greater than 92%   TCC for discharge planning  Social work consult for possible placement back to group home.       I spent 40 minutes in the professional and overall care of this " patient.      Nathan Hancock MD

## 2024-11-16 ENCOUNTER — APPOINTMENT (OUTPATIENT)
Dept: CARDIOLOGY | Facility: HOSPITAL | Age: 39
End: 2024-11-16
Payer: MEDICAID

## 2024-11-16 LAB
ATRIAL RATE: 91 BPM
P AXIS: 69 DEGREES
P OFFSET: 204 MS
P ONSET: 158 MS
PR INTERVAL: 132 MS
Q ONSET: 224 MS
QRS COUNT: 15 BEATS
QRS DURATION: 66 MS
QT INTERVAL: 374 MS
QTC CALCULATION(BAZETT): 460 MS
QTC FREDERICIA: 430 MS
R AXIS: 79 DEGREES
T AXIS: 59 DEGREES
T OFFSET: 411 MS
VENTRICULAR RATE: 91 BPM

## 2024-11-16 PROCEDURE — 2500000001 HC RX 250 WO HCPCS SELF ADMINISTERED DRUGS (ALT 637 FOR MEDICARE OP): Performed by: HOSPITALIST

## 2024-11-16 PROCEDURE — 93010 ELECTROCARDIOGRAM REPORT: CPT | Performed by: INTERNAL MEDICINE

## 2024-11-16 PROCEDURE — 93005 ELECTROCARDIOGRAM TRACING: CPT

## 2024-11-16 PROCEDURE — 2500000002 HC RX 250 W HCPCS SELF ADMINISTERED DRUGS (ALT 637 FOR MEDICARE OP, ALT 636 FOR OP/ED): Performed by: HOSPITALIST

## 2024-11-16 PROCEDURE — 94640 AIRWAY INHALATION TREATMENT: CPT

## 2024-11-16 PROCEDURE — G0378 HOSPITAL OBSERVATION PER HR: HCPCS

## 2024-11-16 PROCEDURE — 99232 SBSQ HOSP IP/OBS MODERATE 35: CPT | Performed by: HOSPITALIST

## 2024-11-16 PROCEDURE — 2500000005 HC RX 250 GENERAL PHARMACY W/O HCPCS: Performed by: HOSPITALIST

## 2024-11-16 PROCEDURE — 94645 CONT INHLJ TX EACH ADDL HOUR: CPT

## 2024-11-16 RX ORDER — BUDESONIDE 0.5 MG/2ML
0.5 INHALANT ORAL 2 TIMES DAILY
Status: ON HOLD | COMMUNITY

## 2024-11-16 RX ORDER — GUAIFENESIN 100 MG/5ML
200 SOLUTION ORAL 2 TIMES DAILY
Status: ON HOLD | COMMUNITY

## 2024-11-16 RX ORDER — FAMOTIDINE 20 MG/1
20 TABLET, FILM COATED ORAL 2 TIMES DAILY
Status: DISCONTINUED | OUTPATIENT
Start: 2024-11-16 | End: 2024-11-17

## 2024-11-16 RX ORDER — FLUTICASONE FUROATE 27.5 UG/1
2 SPRAY, METERED NASAL
Status: ON HOLD | COMMUNITY

## 2024-11-16 RX ORDER — GUAIFENESIN 100 MG/5ML
200 SOLUTION ORAL 2 TIMES DAILY
Status: DISCONTINUED | OUTPATIENT
Start: 2024-11-16 | End: 2024-11-17

## 2024-11-16 RX ORDER — CIMETIDINE 400 MG/1
400 TABLET, FILM COATED ORAL 2 TIMES DAILY
Status: ON HOLD | COMMUNITY

## 2024-11-16 RX ORDER — MONTELUKAST SODIUM 10 MG/1
10 TABLET ORAL NIGHTLY
Status: ON HOLD | COMMUNITY

## 2024-11-16 RX ORDER — FLUOXETINE HYDROCHLORIDE 20 MG/1
40 CAPSULE ORAL 2 TIMES DAILY
Status: DISCONTINUED | OUTPATIENT
Start: 2024-11-16 | End: 2024-11-17

## 2024-11-16 RX ORDER — HYDROXYZINE HYDROCHLORIDE 25 MG/1
50 TABLET, FILM COATED ORAL 2 TIMES DAILY
Status: DISCONTINUED | OUTPATIENT
Start: 2024-11-16 | End: 2024-11-17

## 2024-11-16 RX ORDER — POTASSIUM CHLORIDE 20 MEQ/15ML
20 SOLUTION ORAL DAILY
Status: ON HOLD | COMMUNITY

## 2024-11-16 RX ORDER — BUTALB/ACETAMINOPHEN/CAFFEINE 50-325-40
1 TABLET ORAL DAILY
Status: ON HOLD | COMMUNITY

## 2024-11-16 RX ORDER — MONTELUKAST SODIUM 10 MG/1
10 TABLET ORAL NIGHTLY
Status: DISPENSED | OUTPATIENT
Start: 2024-11-16

## 2024-11-16 RX ORDER — PANTOPRAZOLE SODIUM 40 MG/1
40 TABLET, DELAYED RELEASE ORAL
Status: DISCONTINUED | OUTPATIENT
Start: 2024-11-16 | End: 2024-11-18

## 2024-11-16 RX ORDER — PRAZOSIN HYDROCHLORIDE 2 MG/1
2 CAPSULE ORAL NIGHTLY
Status: ON HOLD | COMMUNITY

## 2024-11-16 RX ORDER — TRAZODONE HYDROCHLORIDE 50 MG/1
100 TABLET ORAL NIGHTLY
Status: DISCONTINUED | OUTPATIENT
Start: 2024-11-16 | End: 2024-11-17

## 2024-11-16 RX ORDER — OLANZAPINE 10 MG/2ML
2.5 INJECTION, POWDER, FOR SOLUTION INTRAMUSCULAR EVERY 6 HOURS PRN
Status: DISCONTINUED | OUTPATIENT
Start: 2024-11-16 | End: 2024-11-16

## 2024-11-16 RX ORDER — GLYCOPYRROLATE 1 MG/1
2 TABLET ORAL 3 TIMES DAILY
Status: ON HOLD | COMMUNITY

## 2024-11-16 RX ORDER — METOCLOPRAMIDE 10 MG/1
10 TABLET ORAL
Status: DISCONTINUED | OUTPATIENT
Start: 2024-11-16 | End: 2024-11-16

## 2024-11-16 RX ORDER — METOCLOPRAMIDE 10 MG/1
10 TABLET ORAL
Status: ON HOLD | COMMUNITY

## 2024-11-16 RX ORDER — CIMETIDINE 400 MG/1
400 TABLET, FILM COATED ORAL 2 TIMES DAILY
Status: DISCONTINUED | OUTPATIENT
Start: 2024-11-16 | End: 2024-11-16

## 2024-11-16 RX ORDER — BUDESONIDE 0.5 MG/2ML
0.5 INHALANT ORAL 2 TIMES DAILY
Status: DISPENSED | OUTPATIENT
Start: 2024-11-16

## 2024-11-16 RX ORDER — METOCLOPRAMIDE 10 MG/1
10 TABLET ORAL
Status: DISCONTINUED | OUTPATIENT
Start: 2024-11-16 | End: 2024-11-17

## 2024-11-16 RX ORDER — FLUOXETINE HYDROCHLORIDE 40 MG/1
40 CAPSULE ORAL 2 TIMES DAILY
Status: ON HOLD | COMMUNITY

## 2024-11-16 RX ORDER — METOPROLOL SUCCINATE 25 MG/1
12.5 TABLET, EXTENDED RELEASE ORAL DAILY
Status: DISPENSED | OUTPATIENT
Start: 2024-11-16

## 2024-11-16 RX ORDER — HYDROXYZINE HYDROCHLORIDE 50 MG/1
50 TABLET, FILM COATED ORAL 2 TIMES DAILY
Status: ON HOLD | COMMUNITY

## 2024-11-16 RX ORDER — TRAZODONE HYDROCHLORIDE 100 MG/1
100 TABLET ORAL NIGHTLY
Status: ON HOLD | COMMUNITY

## 2024-11-16 RX ORDER — PRAZOSIN HYDROCHLORIDE 1 MG/1
2 CAPSULE ORAL NIGHTLY
Status: DISCONTINUED | OUTPATIENT
Start: 2024-11-16 | End: 2024-11-17

## 2024-11-16 RX ORDER — ALBUTEROL SULFATE 0.83 MG/ML
2.5 SOLUTION RESPIRATORY (INHALATION) 4 TIMES DAILY
Status: ON HOLD | COMMUNITY

## 2024-11-16 RX ORDER — OMEPRAZOLE 20 MG/1
20 TABLET, DELAYED RELEASE ORAL 2 TIMES DAILY
Status: ON HOLD | COMMUNITY

## 2024-11-16 RX ORDER — GLYCOPYRROLATE 1 MG/1
2 TABLET ORAL 3 TIMES DAILY
Status: DISCONTINUED | OUTPATIENT
Start: 2024-11-16 | End: 2024-11-17

## 2024-11-16 RX ORDER — OLANZAPINE 2.5 MG/1
2.5 TABLET ORAL 2 TIMES DAILY
Status: DISCONTINUED | OUTPATIENT
Start: 2024-11-16 | End: 2024-11-17

## 2024-11-16 RX ORDER — OLANZAPINE 2.5 MG/1
2.5 TABLET ORAL 2 TIMES DAILY
Status: ON HOLD | COMMUNITY

## 2024-11-16 RX ORDER — IPRATROPIUM BROMIDE AND ALBUTEROL SULFATE 2.5; .5 MG/3ML; MG/3ML
3 SOLUTION RESPIRATORY (INHALATION)
Status: DISCONTINUED | OUTPATIENT
Start: 2024-11-16 | End: 2024-11-17

## 2024-11-16 RX ORDER — METOPROLOL SUCCINATE 25 MG/1
12.5 TABLET, EXTENDED RELEASE ORAL DAILY
Status: ON HOLD | COMMUNITY

## 2024-11-16 SDOH — SOCIAL STABILITY: SOCIAL INSECURITY
WITHIN THE LAST YEAR, HAVE YOU BEEN RAPED OR FORCED TO HAVE ANY KIND OF SEXUAL ACTIVITY BY YOUR PARTNER OR EX-PARTNER?: NO

## 2024-11-16 SDOH — SOCIAL STABILITY: SOCIAL INSECURITY: POSSIBLE ABUSE REPORTED TO:: OTHER (COMMENT)

## 2024-11-16 SDOH — SOCIAL STABILITY: SOCIAL INSECURITY: DO YOU FEEL ANYONE HAS EXPLOITED OR TAKEN ADVANTAGE OF YOU FINANCIALLY OR OF YOUR PERSONAL PROPERTY?: NO

## 2024-11-16 SDOH — HEALTH STABILITY: MENTAL HEALTH: EXPERIENCED ANY OF THE FOLLOWING LIFE EVENTS: LIFE-THREATENING ILLNESS OR INJURY

## 2024-11-16 SDOH — SOCIAL STABILITY: SOCIAL INSECURITY: DOES ANYONE TRY TO KEEP YOU FROM HAVING/CONTACTING OTHER FRIENDS OR DOING THINGS OUTSIDE YOUR HOME?: NO

## 2024-11-16 SDOH — SOCIAL STABILITY: SOCIAL INSECURITY: HAVE YOU HAD THOUGHTS OF HARMING ANYONE ELSE?: NO

## 2024-11-16 SDOH — SOCIAL STABILITY: SOCIAL INSECURITY: WITHIN THE LAST YEAR, HAVE YOU BEEN AFRAID OF YOUR PARTNER OR EX-PARTNER?: NO

## 2024-11-16 SDOH — ECONOMIC STABILITY: INCOME INSECURITY: IN THE PAST 12 MONTHS HAS THE ELECTRIC, GAS, OIL, OR WATER COMPANY THREATENED TO SHUT OFF SERVICES IN YOUR HOME?: NO

## 2024-11-16 SDOH — HEALTH STABILITY: PHYSICAL HEALTH
HOW OFTEN DO YOU NEED TO HAVE SOMEONE HELP YOU WHEN YOU READ INSTRUCTIONS, PAMPHLETS, OR OTHER WRITTEN MATERIAL FROM YOUR DOCTOR OR PHARMACY?: NEVER

## 2024-11-16 SDOH — HEALTH STABILITY: MENTAL HEALTH
DO YOU FEEL STRESS - TENSE, RESTLESS, NERVOUS, OR ANXIOUS, OR UNABLE TO SLEEP AT NIGHT BECAUSE YOUR MIND IS TROUBLED ALL THE TIME - THESE DAYS?: NOT AT ALL

## 2024-11-16 SDOH — SOCIAL STABILITY: SOCIAL INSECURITY: ARE THERE ANY APPARENT SIGNS OF INJURIES/BEHAVIORS THAT COULD BE RELATED TO ABUSE/NEGLECT?: NO

## 2024-11-16 SDOH — SOCIAL STABILITY: SOCIAL INSECURITY: WITHIN THE LAST YEAR, HAVE YOU BEEN HUMILIATED OR EMOTIONALLY ABUSED IN OTHER WAYS BY YOUR PARTNER OR EX-PARTNER?: NO

## 2024-11-16 SDOH — HEALTH STABILITY: PHYSICAL HEALTH: ON AVERAGE, HOW MANY DAYS PER WEEK DO YOU ENGAGE IN MODERATE TO STRENUOUS EXERCISE (LIKE A BRISK WALK)?: 0 DAYS

## 2024-11-16 SDOH — SOCIAL STABILITY: SOCIAL INSECURITY
WITHIN THE LAST YEAR, HAVE YOU BEEN KICKED, HIT, SLAPPED, OR OTHERWISE PHYSICALLY HURT BY YOUR PARTNER OR EX-PARTNER?: NO

## 2024-11-16 SDOH — SOCIAL STABILITY: SOCIAL INSECURITY: ARE YOU MARRIED, WIDOWED, DIVORCED, SEPARATED, NEVER MARRIED, OR LIVING WITH A PARTNER?: NEVER MARRIED

## 2024-11-16 SDOH — SOCIAL STABILITY: SOCIAL NETWORK
DO YOU BELONG TO ANY CLUBS OR ORGANIZATIONS SUCH AS CHURCH GROUPS, UNIONS, FRATERNAL OR ATHLETIC GROUPS, OR SCHOOL GROUPS?: NO

## 2024-11-16 SDOH — SOCIAL STABILITY: SOCIAL NETWORK: IN A TYPICAL WEEK, HOW MANY TIMES DO YOU TALK ON THE PHONE WITH FAMILY, FRIENDS, OR NEIGHBORS?: NEVER

## 2024-11-16 SDOH — HEALTH STABILITY: PHYSICAL HEALTH: ON AVERAGE, HOW MANY MINUTES DO YOU ENGAGE IN EXERCISE AT THIS LEVEL?: 0 MIN

## 2024-11-16 SDOH — SOCIAL STABILITY: SOCIAL INSECURITY: WERE YOU ABLE TO COMPLETE ALL THE BEHAVIORAL HEALTH SCREENINGS?: YES

## 2024-11-16 SDOH — SOCIAL STABILITY: SOCIAL INSECURITY: ABUSE: ADULT

## 2024-11-16 SDOH — SOCIAL STABILITY: SOCIAL NETWORK: HOW OFTEN DO YOU GET TOGETHER WITH FRIENDS OR RELATIVES?: ONCE A WEEK

## 2024-11-16 SDOH — SOCIAL STABILITY: SOCIAL INSECURITY: HAVE YOU HAD ANY THOUGHTS OF HARMING ANYONE ELSE?: NO

## 2024-11-16 SDOH — SOCIAL STABILITY: SOCIAL NETWORK: HOW OFTEN DO YOU ATTEND CHURCH OR RELIGIOUS SERVICES?: NEVER

## 2024-11-16 SDOH — ECONOMIC STABILITY: FOOD INSECURITY
WITHIN THE PAST 12 MONTHS, YOU WORRIED THAT YOUR FOOD WOULD RUN OUT BEFORE YOU GOT THE MONEY TO BUY MORE.: PATIENT DECLINED

## 2024-11-16 SDOH — SOCIAL STABILITY: SOCIAL INSECURITY: HAS ANYONE EVER THREATENED TO HURT YOUR FAMILY OR YOUR PETS?: NO

## 2024-11-16 SDOH — SOCIAL STABILITY: SOCIAL INSECURITY: DO YOU FEEL UNSAFE GOING BACK TO THE PLACE WHERE YOU ARE LIVING?: NO

## 2024-11-16 SDOH — ECONOMIC STABILITY: FOOD INSECURITY: WITHIN THE PAST 12 MONTHS, THE FOOD YOU BOUGHT JUST DIDN'T LAST AND YOU DIDN'T HAVE MONEY TO GET MORE.: PATIENT DECLINED

## 2024-11-16 SDOH — SOCIAL STABILITY: SOCIAL INSECURITY: ARE YOU OR HAVE YOU BEEN THREATENED OR ABUSED PHYSICALLY, EMOTIONALLY, OR SEXUALLY BY ANYONE?: NO

## 2024-11-16 SDOH — SOCIAL STABILITY: SOCIAL NETWORK: HOW OFTEN DO YOU ATTEND MEETINGS OF THE CLUBS OR ORGANIZATIONS YOU BELONG TO?: NEVER

## 2024-11-16 ASSESSMENT — PAIN - FUNCTIONAL ASSESSMENT: PAIN_FUNCTIONAL_ASSESSMENT: WONG-BAKER FACES

## 2024-11-16 ASSESSMENT — COGNITIVE AND FUNCTIONAL STATUS - GENERAL
DRESSING REGULAR LOWER BODY CLOTHING: A LITTLE
WALKING IN HOSPITAL ROOM: A LOT
PERSONAL GROOMING: A LITTLE
DRESSING REGULAR UPPER BODY CLOTHING: A LITTLE
CLIMB 3 TO 5 STEPS WITH RAILING: TOTAL
PATIENT BASELINE BEDBOUND: UNABLE TO ASSESS AT THIS TIME
EATING MEALS: A LITTLE
HELP NEEDED FOR BATHING: A LITTLE
MOBILITY SCORE: 16
MOVING TO AND FROM BED TO CHAIR: A LITTLE
PERSONAL GROOMING: A LITTLE
DRESSING REGULAR UPPER BODY CLOTHING: A LITTLE
STANDING UP FROM CHAIR USING ARMS: A LITTLE
MOBILITY SCORE: 17
TOILETING: A LITTLE
EATING MEALS: A LITTLE
HELP NEEDED FOR BATHING: A LITTLE
STANDING UP FROM CHAIR USING ARMS: A LOT
DRESSING REGULAR LOWER BODY CLOTHING: A LITTLE
MOVING TO AND FROM BED TO CHAIR: A LITTLE
CLIMB 3 TO 5 STEPS WITH RAILING: TOTAL
DAILY ACTIVITIY SCORE: 18
WALKING IN HOSPITAL ROOM: A LOT
DAILY ACTIVITIY SCORE: 18
TOILETING: A LITTLE

## 2024-11-16 ASSESSMENT — ACTIVITIES OF DAILY LIVING (ADL)
JUDGMENT_ADEQUATE_SAFELY_COMPLETE_DAILY_ACTIVITIES: UNABLE TO ASSESS
FEEDING YOURSELF: NEEDS ASSISTANCE
GROOMING: NEEDS ASSISTANCE
DRESSING YOURSELF: NEEDS ASSISTANCE
ASSISTIVE_DEVICE: WHEELCHAIR;OXYGEN
ADEQUATE_TO_COMPLETE_ADL: YES
HEARING - RIGHT EAR: FUNCTIONAL
LACK_OF_TRANSPORTATION: PATIENT DECLINED
BATHING: NEEDS ASSISTANCE
PATIENT'S MEMORY ADEQUATE TO SAFELY COMPLETE DAILY ACTIVITIES?: UNABLE TO ASSESS
WALKS IN HOME: NEEDS ASSISTANCE
TOILETING: NEEDS ASSISTANCE
HEARING - LEFT EAR: FUNCTIONAL

## 2024-11-16 ASSESSMENT — COLUMBIA-SUICIDE SEVERITY RATING SCALE - C-SSRS
2. HAVE YOU ACTUALLY HAD ANY THOUGHTS OF KILLING YOURSELF?: NO
1. IN THE PAST MONTH, HAVE YOU WISHED YOU WERE DEAD OR WISHED YOU COULD GO TO SLEEP AND NOT WAKE UP?: NO
6. HAVE YOU EVER DONE ANYTHING, STARTED TO DO ANYTHING, OR PREPARED TO DO ANYTHING TO END YOUR LIFE?: NO

## 2024-11-16 ASSESSMENT — LIFESTYLE VARIABLES
HOW MANY STANDARD DRINKS CONTAINING ALCOHOL DO YOU HAVE ON A TYPICAL DAY: PATIENT DOES NOT DRINK
SUBSTANCE_ABUSE_PAST_12_MONTHS: NO
AUDIT-C TOTAL SCORE: 0
AUDIT-C TOTAL SCORE: 0
SKIP TO QUESTIONS 9-10: 1
PRESCIPTION_ABUSE_PAST_12_MONTHS: NO
HOW OFTEN DO YOU HAVE A DRINK CONTAINING ALCOHOL: NEVER
HOW OFTEN DO YOU HAVE 6 OR MORE DRINKS ON ONE OCCASION: NEVER

## 2024-11-16 ASSESSMENT — PATIENT HEALTH QUESTIONNAIRE - PHQ9
SUM OF ALL RESPONSES TO PHQ9 QUESTIONS 1 & 2: 0
2. FEELING DOWN, DEPRESSED OR HOPELESS: NOT AT ALL
1. LITTLE INTEREST OR PLEASURE IN DOING THINGS: NOT AT ALL

## 2024-11-16 ASSESSMENT — PAIN SCALES - GENERAL
PAINLEVEL_OUTOF10: 0 - NO PAIN
PAINLEVEL_OUTOF10: 0 - NO PAIN

## 2024-11-16 ASSESSMENT — PAIN SCALES - WONG BAKER
WONGBAKER_NUMERICALRESPONSE: NO HURT
WONGBAKER_NUMERICALRESPONSE: NO HURT

## 2024-11-16 NOTE — PROGRESS NOTES
"Carolina Chowdhury is a 39 y.o. female on day 0 of admission presenting with increased secretions from group home      Subjective   Secretions improved, wanting to go home     Objective     Last Recorded Vitals  /86   Pulse 91   Temp 37.2 °C (99 °F)   Resp 18   Ht 1.575 m (5' 2\")   Wt 49 kg (108 lb 0.4 oz)   SpO2 95%   BMI 19.76 kg/m²      Intake/Output last 3 Shifts:    Intake/Output Summary (Last 24 hours) at 11/16/2024 1228  Last data filed at 11/16/2024 1200  Gross per 24 hour   Intake 360 ml   Output 250 ml   Net 110 ml       Scheduled medications  budesonide, 0.5 mg, nebulization, BID  cimetidine, 400 mg, g-tube, BID  FLUoxetine, 40 mg, g-tube, BID  glycopyrrolate, 2 mg, g-tube, TID  guaiFENesin, 200 mg, g-tube, BID  hydrOXYzine HCL, 50 mg, g-tube, BID  ipratropium-albuteroL, 3 mL, nebulization, q6h  lubricating eye drops, 2 drop, Both Eyes, BID  metoclopramide, 10 mg, g-tube, TID  metoprolol succinate XL, 12.5 mg, oral, Daily  montelukast, 10 mg, oral, Nightly  OLANZapine, 2.5 mg, g-tube, BID  oxygen, , inhalation, Continuous - Inhalation  pantoprazole, 40 mg, oral, BID AC  polyethylene glycol, 17 g, oral, Daily  prazosin, 2 mg, g-tube, Nightly  traZODone, 100 mg, g-tube, Nightly      Continuous medications     PRN medications      Physical Exam   Gen: NAD  HEENT: EOM, MMM  CV: RRR, no murmurs rubs or gallops  Resp: coarse rhonchi b/l, trach in place  Abdomen: soft, NT,+BS, peg in place   LE: No edema      Relevant Results  Lab Results   Component Value Date    WBC 11.6 (H) 11/15/2024    HGB 13.4 11/15/2024    HCT 41.0 11/15/2024    MCV 96 11/15/2024     11/15/2024     Lab Results   Component Value Date    GLUCOSE 84 11/15/2024    CALCIUM 9.2 11/15/2024     11/15/2024    K 3.8 11/15/2024    CO2 28 11/15/2024     11/15/2024    BUN 12 11/15/2024    CREATININE 0.59 11/15/2024         Assessment/Plan 39 year old female with history of MRDD admitted from group home for increased " secretions    -improved, and now at baseline     Chronic respiratory failure s/p post tracheostomy  -continue nebs    On tube feeds: continue tube feeds as ordered, nutren substitute with free water flushes.   -dietitian consulted     Social work following for placement    DVT ppx: SCD           Miquel Lopez MD

## 2024-11-16 NOTE — CARE PLAN
Problem: Fall/Injury  Goal: Not fall by end of shift  Outcome: Progressing     The patient's goals for the shift include comfort/safety    The clinical goals for the shift include comfort/safety

## 2024-11-16 NOTE — CARE PLAN
Procedure: Boost The patient's goals for the shift include comfort/safety    The clinical goals for the shift include comfort/safety     Vacuum Pressure Low Setting (Will Not Render If Set To 0): 13 Vacuum Pressure Low Setting (Will Not Render If Set To 0): 19 Number Of Passes Step 6: 0 Location: face Number Of Passes Step 4: 1 Solution Override: Beta Clear HD Solution: Activ-4 Solution: GlySal 15% Tip: Hydropeel Tip, Teal Solution Override: Antiox+ Comments: Patient said she felt no tingling sensation with the peel. There was light heat and light to mild erythema in the skin. Manual extractions were performed on the nose and chin using cotton tipped applicators and a comedone extractor.\\n\\nProducts Used: Antioxidant Cleanser, Ultra Hydrating Treatment, Alto Advanced, Trio Rebalancing Moisture Treatment, and Sheer Physical SPF 50 Procedure: Extraction Tip: Hydropeel Tip, Clear Solution Override Procedure: Fusion Consent: Written consent obtained, risks reviewed including but not limited to crusting, scabbing, blistering, scarring, darker or lighter pigmentary change, bruising, and/or incomplete response. Price (Use Numbers Only, No Special Characters Or $): 361 Number Of Passes Step 1: 2 Post-Care Instructions: I reviewed with the patient in detail post-care instructions. Patient should stay away from the sun and wear sun protection until treated areas are fully healed. Patient should avoid any exfoliating products and retinols for 1 week post procedure. Patient is aware this treatment can cause purging of the skin. Indication: dehydrated skin Tip: Hydropeel Tip, Blue Procedure: Peel Procedure: Exfoliation Tip Override

## 2024-11-16 NOTE — NURSING NOTE
Went to check on patient, patient found with tube feed tubing wrapped up and biting on. Repositioned patient and took down tube feed.

## 2024-11-16 NOTE — PROGRESS NOTES
Writer was notified by attending that patient is medically stable and has no acute findings that need hospitalizations. Workup negative for covid and flu. Patient was admitted observation as group home refused to accept patient back do to no night shift nurse. Her last day was last night. Calosr spoke with Kaelyn nurse manager that patient has no medical reason to be in the hospital and is ready to return to the group home. She reports that cannot admit back as have no nursing staff to care for patients needs. Writer informed her patient medically at baseline and it is the group [facility] responsibility to secure appropriate care needed for patient. Hospitalization is for acute needs and not appropriate to send to hospital due to there staffing. She reports it came from her leadership to send to the hospital. Writer informed the nurse manager Kaelyn will be looking at sending patient back on Monday to ensure able to secure nursing to meet patients needs. Writer to follow-up with patients  with mrdd Monday. Care Transition team to continue to follow and assist as needed. LYLA Osorio

## 2024-11-16 NOTE — CONSULTS
Social Work Note  The LSW met with the pt this date and called her brother, Afshin who is the guardian for the pt. The brother had been contacted by the [facility] nurse manager yesterday re: the pt's hospitalization and that [facility] is not able to manage the pt's medical needs having 2 nurses quit and no nurse on the night shift. The LSW was informed by the nurse manager that the pt has had a notice to leave the facility for several months and beyond the required 30 day notice.  The staff have been working on placement from [facility]. The pt and brother initially were opposed to a nursing facility as the brother believes the best place for the pt is a group home setting. The brother cannot take the pt to his home as a discharge plan. The LSW informed the brother that Kaelyn, the [facility] nurse manager stated that she had faxed a referral to the [facility] on 11/15/25. The brother gave the LSW to send clinical information to [facility] as a discharge option. Information will be sent to [facility] this date for review and acceptance.

## 2024-11-17 VITALS
DIASTOLIC BLOOD PRESSURE: 56 MMHG | RESPIRATION RATE: 18 BRPM | WEIGHT: 108.03 LBS | OXYGEN SATURATION: 97 % | TEMPERATURE: 98.2 F | BODY MASS INDEX: 19.88 KG/M2 | SYSTOLIC BLOOD PRESSURE: 120 MMHG | HEIGHT: 62 IN | HEART RATE: 75 BPM

## 2024-11-17 PROCEDURE — G0378 HOSPITAL OBSERVATION PER HR: HCPCS

## 2024-11-17 PROCEDURE — 2500000001 HC RX 250 WO HCPCS SELF ADMINISTERED DRUGS (ALT 637 FOR MEDICARE OP): Performed by: HOSPITALIST

## 2024-11-17 PROCEDURE — 94640 AIRWAY INHALATION TREATMENT: CPT

## 2024-11-17 PROCEDURE — 99232 SBSQ HOSP IP/OBS MODERATE 35: CPT | Performed by: HOSPITALIST

## 2024-11-17 PROCEDURE — 2500000005 HC RX 250 GENERAL PHARMACY W/O HCPCS: Performed by: HOSPITALIST

## 2024-11-17 PROCEDURE — 92610 EVALUATE SWALLOWING FUNCTION: CPT | Mod: GN | Performed by: SPEECH-LANGUAGE PATHOLOGIST

## 2024-11-17 PROCEDURE — 2500000002 HC RX 250 W HCPCS SELF ADMINISTERED DRUGS (ALT 637 FOR MEDICARE OP, ALT 636 FOR OP/ED): Performed by: HOSPITALIST

## 2024-11-17 RX ORDER — METOCLOPRAMIDE 10 MG/1
10 TABLET ORAL
Status: DISCONTINUED | OUTPATIENT
Start: 2024-11-17 | End: 2024-11-17

## 2024-11-17 RX ORDER — OLANZAPINE 5 MG/1
2.5 TABLET ORAL 2 TIMES DAILY
Status: DISPENSED | OUTPATIENT
Start: 2024-11-17

## 2024-11-17 RX ORDER — IPRATROPIUM BROMIDE AND ALBUTEROL SULFATE 2.5; .5 MG/3ML; MG/3ML
3 SOLUTION RESPIRATORY (INHALATION)
Status: DISPENSED | OUTPATIENT
Start: 2024-11-17

## 2024-11-17 RX ORDER — FLUOXETINE HYDROCHLORIDE 20 MG/1
40 CAPSULE ORAL 2 TIMES DAILY
Status: DISPENSED | OUTPATIENT
Start: 2024-11-17

## 2024-11-17 RX ORDER — METOCLOPRAMIDE 10 MG/1
10 TABLET ORAL
Status: DISPENSED | OUTPATIENT
Start: 2024-11-17

## 2024-11-17 RX ORDER — ACETAMINOPHEN 325 MG/1
650 TABLET ORAL EVERY 4 HOURS PRN
Status: DISCONTINUED | OUTPATIENT
Start: 2024-11-17 | End: 2024-11-17

## 2024-11-17 RX ORDER — FAMOTIDINE 20 MG/1
20 TABLET, FILM COATED ORAL 2 TIMES DAILY
Status: DISPENSED | OUTPATIENT
Start: 2024-11-17

## 2024-11-17 RX ORDER — GUAIFENESIN 100 MG/5ML
200 SOLUTION ORAL 2 TIMES DAILY
Status: DISPENSED | OUTPATIENT
Start: 2024-11-17

## 2024-11-17 RX ORDER — GLYCOPYRROLATE 1 MG/1
2 TABLET ORAL 3 TIMES DAILY
Status: DISPENSED | OUTPATIENT
Start: 2024-11-17

## 2024-11-17 RX ORDER — PRAZOSIN HYDROCHLORIDE 1 MG/1
2 CAPSULE ORAL NIGHTLY
Status: DISPENSED | OUTPATIENT
Start: 2024-11-17

## 2024-11-17 RX ORDER — HYDROXYZINE HYDROCHLORIDE 25 MG/1
50 TABLET, FILM COATED ORAL 2 TIMES DAILY
Status: DISPENSED | OUTPATIENT
Start: 2024-11-17

## 2024-11-17 RX ORDER — ACETAMINOPHEN 160 MG/5ML
650 SOLUTION ORAL EVERY 4 HOURS PRN
Status: DISCONTINUED | OUTPATIENT
Start: 2024-11-17 | End: 2024-11-17

## 2024-11-17 RX ORDER — TRAZODONE HYDROCHLORIDE 50 MG/1
100 TABLET ORAL NIGHTLY
Status: DISPENSED | OUTPATIENT
Start: 2024-11-17

## 2024-11-17 RX ORDER — ACETAMINOPHEN 325 MG/1
650 TABLET ORAL EVERY 4 HOURS PRN
Status: DISPENSED | OUTPATIENT
Start: 2024-11-17

## 2024-11-17 RX ORDER — ACETAMINOPHEN 160 MG/5ML
650 SOLUTION ORAL EVERY 4 HOURS PRN
Status: DISPENSED | OUTPATIENT
Start: 2024-11-17

## 2024-11-17 ASSESSMENT — COGNITIVE AND FUNCTIONAL STATUS - GENERAL
TOILETING: A LITTLE
WALKING IN HOSPITAL ROOM: A LOT
CLIMB 3 TO 5 STEPS WITH RAILING: TOTAL
DRESSING REGULAR LOWER BODY CLOTHING: A LITTLE
MOVING TO AND FROM BED TO CHAIR: A LITTLE
HELP NEEDED FOR BATHING: A LITTLE
WALKING IN HOSPITAL ROOM: A LOT
PERSONAL GROOMING: A LITTLE
DAILY ACTIVITIY SCORE: 18
DRESSING REGULAR LOWER BODY CLOTHING: A LITTLE
EATING MEALS: A LITTLE
MOBILITY SCORE: 16
TOILETING: A LITTLE
DRESSING REGULAR UPPER BODY CLOTHING: A LITTLE
MOBILITY SCORE: 16
MOVING TO AND FROM BED TO CHAIR: A LITTLE
DRESSING REGULAR UPPER BODY CLOTHING: A LITTLE
DAILY ACTIVITIY SCORE: 18
STANDING UP FROM CHAIR USING ARMS: A LOT
CLIMB 3 TO 5 STEPS WITH RAILING: TOTAL
HELP NEEDED FOR BATHING: A LITTLE
PERSONAL GROOMING: A LITTLE
EATING MEALS: A LITTLE
STANDING UP FROM CHAIR USING ARMS: A LOT

## 2024-11-17 ASSESSMENT — PAIN - FUNCTIONAL ASSESSMENT: PAIN_FUNCTIONAL_ASSESSMENT: WONG-BAKER FACES

## 2024-11-17 ASSESSMENT — PAIN SCALES - WONG BAKER
WONGBAKER_NUMERICALRESPONSE: HURTS LITTLE BIT
WONGBAKER_NUMERICALRESPONSE: NO HURT

## 2024-11-17 ASSESSMENT — PAIN DESCRIPTION - LOCATION: LOCATION: FOOT

## 2024-11-17 NOTE — PROGRESS NOTES
Social Work Note Sent clinical updates to Martita in [facility] [place].  AYDIN Berry

## 2024-11-17 NOTE — PROGRESS NOTES
Speech-Language Pathology    SLP Adult Inpatient Speech-Language Pathology Clinical Swallow Evaluation    Patient Name: Carolina Chowdhury  MRN: [id number]  Today's Date: 11/17/2024   Time Calculation  Start Time: 0912  Stop Time: 0925  Time Calculation (min): 13 min         Current Problem:   1. Tracheostomy care (Multi)        2. Intellectual disability          SLP Assessment:  Pt presents with moderate dysphagia characterized by anterior spillage, delayed swallow initiation, decreased laryngeal elevation, deficits with mastication, wet respiratory quality, gurgling, and cough.    Skilled SLP services are warranted in order to facilitate pt safety with PO intake, pt tolerance of LRD, and reduce risk for aspiration/penetration.     SLP TX Intervention Outcomes: Goals Established  Prognosis: Good  Treatment Tolerance: Patient tolerated treatment well  Medical Staff Made Aware: Yes  Strengths: Motivation  Barriers: Cognitive impairment    Plan:  Inpatient/Swing Bed or Outpatient: Inpatient  Treatment/Interventions:   SLP Plan: Skilled SLP  SLP Frequency: 3x per week  Duration: 30 days  Diet Recommendations:   Solid Consistency: Puree  Liquid Consistency: Thin via soon ONLY  Discussed POC: Patient, Nursing  Discussed Risks/Benefits: Yes  Patient/Caregiver Agreeable: Yes    Goals:  Pt will demonstrate tolerance of thin liquids via cup with no overt s/s aspiration/penetration in order to facilitate pt safety with PO intake and pt return to PLOF.     Subjective   Current Problem:  Pt admitted with increased secretions in her trach. Workup in ED including lab values chest x-ray were all unremarkable. Influenza and COVID were negative.     General Visit Information:  Chart Reviewed: Yes  Patient Class: Inpatient  Living Environment: Group Home  Ordering Physician: Jessica  Reason for Referral: dysphagia  Referred By: Jessica  Past Medical History Relevant to Rehab: MRDD, CP, trach since birth, PEG tube and colostomy, nonverbal  Prior  Level of Function: Moderate  Base[employer] Diet: Puree/Thin  Current Diet : NPO  Prior to Session Communication: Nurse    Objective:  Pain:  Pain Assessment: 0-10  Pain Score: 0  Dysphagia Diagnosis: moderate oropharyngeal dysphagia    Baseline Assessment:  Behavior/Cognition: Impaired at baseline  Patient Positioning: Upright in bed  Baseline Vocal Quality: Unable to assess- nonverbal at baseline  Volitional Cough: Unable to elicit due to cognitive deficits  Volitional Swallow: Unable to elicit due to cognitive deficits      Oral/Motor Assessment:  Vocal Quality: N/A - nonverbal at baseline  Vocal Quality Impairment: N/A  Management of secretions: WFL  Lingual ROM and strength: Difficult to assess due to patient's deficits with following directives for completion of oral mech exam. Appears impaired.  Labial ROM and strength: Difficult to assess due to patient's deficits with following directives for completion of oral mech exam. Appears impaired.  Dentition: Natural    Consistencies Trialed:  Consistencies Trialed: Yes  Consistencies Trialed: Thin via cup and spoon, puree      Clinical Observations:  Patient Positioning: Upright in bed  Management of Oral Secretions: WFL  Was The 3 oz Swallow Protocol Completed: No- patient unable to follow direction to continue drinking for intake of 3 oz  Signs/Symptoms of Aspiration: wet respiratory quality and gurgling x4 with cup trials and x1 with spoon  Prolonged Oral Manipulation: puree  Immediate Cough: x1 thin via cup  Delayed Cough: N/A    Inpatient Education:  Education Documentation  Pt educated on results of bedside swallow evaluation and current diet recommendations.  Education Comments  Pt indicated understanding via head gesture however SLP suspects poor understanding due to cognitive deficits at baseline.    Recommendations:  Risk for Aspiration: Yes  Additional Recommendations: total assist for PO intake  Solid Diet Recommendations : Puree  Liquid Diet  Recommendations: Thin via spoon ONLY  Compensatory Swallowing Strategies: small bites/sips, alternating bites/sips, reduced rate of intake, upright positioning for PO intake    Consultations/Referrals/Coordination of Services: N/A           Consent (Lip)/Introductory Paragraph: The rationale for Mohs was explained to the patient and consent was obtained. The risks, benefits and alternatives to therapy were discussed in detail. Specifically, the risks of lip deformity, changes in the oral aperture, infection, scarring, bleeding, prolonged wound healing, incomplete removal, allergy to anesthesia, nerve injury and recurrence were addressed. Prior to the procedure, the treatment site was clearly identified and confirmed by the patient. All components of Universal Protocol/PAUSE Rule completed.

## 2024-11-17 NOTE — PROGRESS NOTES
"Carolina Chowdhury is a 39 y.o. female on day 0 of admission presenting with increased secretions from group home      Subjective   Secretions improved, wanting to go home     Objective     Last Recorded Vitals  /72   Pulse 87   Temp 36.9 °C (98.4 °F)   Resp 18   Ht 1.575 m (5' 2\")   Wt 49 kg (108 lb 0.4 oz)   SpO2 96%   BMI 19.76 kg/m²      Intake/Output last 3 Shifts:    Intake/Output Summary (Last 24 hours) at 11/17/2024 0927  Last data filed at 11/16/2024 1933  Gross per 24 hour   Intake 970 ml   Output 50 ml   Net 920 ml       Scheduled medications  budesonide, 0.5 mg, nebulization, BID  famotidine, 20 mg, oral, BID  FLUoxetine, 40 mg, oral, BID  glycopyrrolate, 2 mg, oral, TID  guaiFENesin, 200 mg, oral, BID  hydrOXYzine HCL, 50 mg, oral, BID  ipratropium-albuteroL, 3 mL, nebulization, TID  lubricating eye drops, 2 drop, Both Eyes, BID  metoclopramide, 10 mg, oral, TID  metoprolol succinate XL, 12.5 mg, oral, Daily  montelukast, 10 mg, oral, Nightly  OLANZapine, 2.5 mg, oral, BID  oxygen, , inhalation, Continuous - Inhalation  pantoprazole, 40 mg, oral, BID AC  polyethylene glycol, 17 g, oral, Daily  prazosin, 2 mg, oral, Nightly  traZODone, 100 mg, oral, Nightly      Continuous medications     PRN medications  PRN medications: acetaminophen **OR** acetaminophen    Physical Exam   Gen: NAD  HEENT: EOM, MMM  CV: RRR, no murmurs rubs or gallops  Resp: coarse rhonchi b/l, trach in place  Abdomen: soft, NT,+BS, peg in place   LE: No edema      Relevant Results  Lab Results   Component Value Date    WBC 11.6 (H) 11/15/2024    HGB 13.4 11/15/2024    HCT 41.0 11/15/2024    MCV 96 11/15/2024     11/15/2024     Lab Results   Component Value Date    GLUCOSE 84 11/15/2024    CALCIUM 9.2 11/15/2024     11/15/2024    K 3.8 11/15/2024    CO2 28 11/15/2024     11/15/2024    BUN 12 11/15/2024    CREATININE 0.59 11/15/2024         Assessment/Plan 39 year old female with history of MRDD admitted from " group home for increased secretions    -improved, and now at baseline     Chronic respiratory failure s/p post tracheostomy  -continue nebs    On tube feeds: continue tube feeds night 240ml osmolite (dont have order for nutren here) with free water flush after feed  -on purred diet thin liquid at group home during the day,   -verified diet plan above with group home  -dietitian and speech consulted here      Social work following for placement    DVT ppx: SCD      Miquel Lopez MD

## 2024-11-18 ENCOUNTER — APPOINTMENT (OUTPATIENT)
Dept: RADIOLOGY | Facility: HOSPITAL | Age: 39
End: 2024-11-18
Payer: MEDICAID

## 2024-11-18 ENCOUNTER — APPOINTMENT (OUTPATIENT)
Dept: NEUROLOGY | Facility: HOSPITAL | Age: 39
End: 2024-11-18
Payer: MEDICAID

## 2024-11-18 PROBLEM — R56.9 SEIZURE (MULTI): Status: ACTIVE | Noted: 2024-11-18

## 2024-11-18 LAB
AMMONIA PLAS-SCNC: 219 UMOL/L (ref 16–53)
ANION GAP SERPL CALC-SCNC: 30 MMOL/L (ref 10–20)
ANION GAP SERPL CALC-SCNC: 8 MMOL/L (ref 10–20)
ATRIAL RATE: 91 BPM
BASOPHILS # BLD AUTO: 0.08 X10*3/UL (ref 0–0.1)
BASOPHILS NFR BLD AUTO: 0.5 %
BUN SERPL-MCNC: 10 MG/DL (ref 6–23)
BUN SERPL-MCNC: 10 MG/DL (ref 6–23)
CALCIUM SERPL-MCNC: 8.2 MG/DL (ref 8.6–10.3)
CALCIUM SERPL-MCNC: 9.3 MG/DL (ref 8.6–10.3)
CHLORIDE SERPL-SCNC: 104 MMOL/L (ref 98–107)
CHLORIDE SERPL-SCNC: 107 MMOL/L (ref 98–107)
CK SERPL-CCNC: 52 U/L (ref 0–215)
CO2 SERPL-SCNC: 25 MMOL/L (ref 21–32)
CO2 SERPL-SCNC: 9 MMOL/L (ref 21–32)
CREAT SERPL-MCNC: 0.65 MG/DL (ref 0.5–1.05)
CREAT SERPL-MCNC: 0.79 MG/DL (ref 0.5–1.05)
EGFRCR SERPLBLD CKD-EPI 2021: >90 ML/MIN/1.73M*2
EGFRCR SERPLBLD CKD-EPI 2021: >90 ML/MIN/1.73M*2
EOSINOPHIL # BLD AUTO: 0.57 X10*3/UL (ref 0–0.7)
EOSINOPHIL NFR BLD AUTO: 3.5 %
ERYTHROCYTE [DISTWIDTH] IN BLOOD BY AUTOMATED COUNT: 11.9 % (ref 11.5–14.5)
GLUCOSE BLD MANUAL STRIP-MCNC: 133 MG/DL (ref 74–99)
GLUCOSE SERPL-MCNC: 103 MG/DL (ref 74–99)
GLUCOSE SERPL-MCNC: 112 MG/DL (ref 74–99)
HCT VFR BLD AUTO: 46.7 % (ref 36–46)
HGB BLD-MCNC: 14.4 G/DL (ref 12–16)
HOLD SPECIMEN: NORMAL
IMM GRANULOCYTES # BLD AUTO: 0.05 X10*3/UL (ref 0–0.7)
IMM GRANULOCYTES NFR BLD AUTO: 0.3 % (ref 0–0.9)
LACTATE SERPL-SCNC: 1.5 MMOL/L (ref 0.4–2)
LACTATE SERPL-SCNC: 16.9 MMOL/L (ref 0.4–2)
LYMPHOCYTES # BLD AUTO: 5.53 X10*3/UL (ref 1.2–4.8)
LYMPHOCYTES NFR BLD AUTO: 34.4 %
MCH RBC QN AUTO: 31.1 PG (ref 26–34)
MCHC RBC AUTO-ENTMCNC: 30.8 G/DL (ref 32–36)
MCV RBC AUTO: 101 FL (ref 80–100)
MONOCYTES # BLD AUTO: 1.41 X10*3/UL (ref 0.1–1)
MONOCYTES NFR BLD AUTO: 8.8 %
NEUTROPHILS # BLD AUTO: 8.45 X10*3/UL (ref 1.2–7.7)
NEUTROPHILS NFR BLD AUTO: 52.5 %
NRBC BLD-RTO: 0 /100 WBCS (ref 0–0)
P AXIS: 69 DEGREES
P OFFSET: 204 MS
P ONSET: 158 MS
PLATELET # BLD AUTO: 442 X10*3/UL (ref 150–450)
POTASSIUM SERPL-SCNC: 3.6 MMOL/L (ref 3.5–5.3)
POTASSIUM SERPL-SCNC: 3.8 MMOL/L (ref 3.5–5.3)
PR INTERVAL: 132 MS
PROLACTIN SERPL-MCNC: 347.3 UG/L (ref 3–20)
Q ONSET: 224 MS
QRS COUNT: 15 BEATS
QRS DURATION: 66 MS
QT INTERVAL: 374 MS
QTC CALCULATION(BAZETT): 460 MS
QTC FREDERICIA: 430 MS
R AXIS: 79 DEGREES
RBC # BLD AUTO: 4.63 X10*6/UL (ref 4–5.2)
SODIUM SERPL-SCNC: 136 MMOL/L (ref 136–145)
SODIUM SERPL-SCNC: 139 MMOL/L (ref 136–145)
T AXIS: 59 DEGREES
T OFFSET: 411 MS
VENTRICULAR RATE: 91 BPM
WBC # BLD AUTO: 16.1 X10*3/UL (ref 4.4–11.3)

## 2024-11-18 PROCEDURE — 99238 HOSP IP/OBS DSCHRG MGMT 30/<: CPT | Performed by: INTERNAL MEDICINE

## 2024-11-18 PROCEDURE — 1210000001 HC SEMI-PRIVATE ROOM DAILY

## 2024-11-18 PROCEDURE — 71045 X-RAY EXAM CHEST 1 VIEW: CPT | Performed by: RADIOLOGY

## 2024-11-18 PROCEDURE — 71045 X-RAY EXAM CHEST 1 VIEW: CPT

## 2024-11-18 PROCEDURE — 70450 CT HEAD/BRAIN W/O DYE: CPT | Performed by: RADIOLOGY

## 2024-11-18 PROCEDURE — 36415 COLL VENOUS BLD VENIPUNCTURE: CPT | Performed by: INTERNAL MEDICINE

## 2024-11-18 PROCEDURE — 84146 ASSAY OF PROLACTIN: CPT | Mod: ELYLAB | Performed by: INTERNAL MEDICINE

## 2024-11-18 PROCEDURE — 95813 EEG EXTND MNTR 61-119 MIN: CPT | Performed by: PSYCHIATRY & NEUROLOGY

## 2024-11-18 PROCEDURE — 83605 ASSAY OF LACTIC ACID: CPT | Performed by: INTERNAL MEDICINE

## 2024-11-18 PROCEDURE — 70450 CT HEAD/BRAIN W/O DYE: CPT

## 2024-11-18 PROCEDURE — 99254 IP/OBS CNSLTJ NEW/EST MOD 60: CPT | Performed by: STUDENT IN AN ORGANIZED HEALTH CARE EDUCATION/TRAINING PROGRAM

## 2024-11-18 PROCEDURE — 2500000004 HC RX 250 GENERAL PHARMACY W/ HCPCS (ALT 636 FOR OP/ED): Performed by: INTERNAL MEDICINE

## 2024-11-18 PROCEDURE — 2500000002 HC RX 250 W HCPCS SELF ADMINISTERED DRUGS (ALT 637 FOR MEDICARE OP, ALT 636 FOR OP/ED): Performed by: HOSPITALIST

## 2024-11-18 PROCEDURE — 85025 COMPLETE CBC W/AUTO DIFF WBC: CPT | Performed by: INTERNAL MEDICINE

## 2024-11-18 PROCEDURE — 82140 ASSAY OF AMMONIA: CPT | Performed by: INTERNAL MEDICINE

## 2024-11-18 PROCEDURE — 93010 ELECTROCARDIOGRAM REPORT: CPT | Performed by: INTERNAL MEDICINE

## 2024-11-18 PROCEDURE — 2500000005 HC RX 250 GENERAL PHARMACY W/O HCPCS: Performed by: HOSPITALIST

## 2024-11-18 PROCEDURE — 70551 MRI BRAIN STEM W/O DYE: CPT | Mod: 52

## 2024-11-18 PROCEDURE — 70551 MRI BRAIN STEM W/O DYE: CPT | Mod: REDUCED SERVICES | Performed by: STUDENT IN AN ORGANIZED HEALTH CARE EDUCATION/TRAINING PROGRAM

## 2024-11-18 PROCEDURE — 2500000001 HC RX 250 WO HCPCS SELF ADMINISTERED DRUGS (ALT 637 FOR MEDICARE OP): Performed by: INTERNAL MEDICINE

## 2024-11-18 PROCEDURE — 82550 ASSAY OF CK (CPK): CPT | Performed by: INTERNAL MEDICINE

## 2024-11-18 PROCEDURE — 80048 BASIC METABOLIC PNL TOTAL CA: CPT | Performed by: INTERNAL MEDICINE

## 2024-11-18 PROCEDURE — 2500000001 HC RX 250 WO HCPCS SELF ADMINISTERED DRUGS (ALT 637 FOR MEDICARE OP): Performed by: HOSPITALIST

## 2024-11-18 PROCEDURE — 82947 ASSAY GLUCOSE BLOOD QUANT: CPT

## 2024-11-18 PROCEDURE — 95813 EEG EXTND MNTR 61-119 MIN: CPT

## 2024-11-18 PROCEDURE — 94640 AIRWAY INHALATION TREATMENT: CPT

## 2024-11-18 RX ORDER — IPRATROPIUM BROMIDE AND ALBUTEROL SULFATE 2.5; .5 MG/3ML; MG/3ML
3 SOLUTION RESPIRATORY (INHALATION)
Start: 2024-11-18

## 2024-11-18 RX ORDER — LEVETIRACETAM 500 MG/1
500 TABLET ORAL 2 TIMES DAILY
Status: DISCONTINUED | OUTPATIENT
Start: 2024-11-18 | End: 2024-11-18

## 2024-11-18 RX ORDER — PANTOPRAZOLE SODIUM 40 MG/10ML
40 INJECTION, POWDER, LYOPHILIZED, FOR SOLUTION INTRAVENOUS 2 TIMES DAILY
Status: DISCONTINUED | OUTPATIENT
Start: 2024-11-18 | End: 2024-11-20

## 2024-11-18 RX ORDER — ENOXAPARIN SODIUM 100 MG/ML
40 INJECTION SUBCUTANEOUS EVERY 24 HOURS
Status: DISPENSED | OUTPATIENT
Start: 2024-11-18

## 2024-11-18 RX ORDER — SODIUM CHLORIDE 9 MG/ML
50 INJECTION, SOLUTION INTRAVENOUS CONTINUOUS
Status: DISCONTINUED | OUTPATIENT
Start: 2024-11-18 | End: 2024-11-19

## 2024-11-18 RX ORDER — LEVETIRACETAM 10 MG/ML
1000 INJECTION INTRAVASCULAR ONCE
Status: COMPLETED | OUTPATIENT
Start: 2024-11-18 | End: 2024-11-18

## 2024-11-18 RX ORDER — LEVETIRACETAM 100 MG/ML
500 SOLUTION ORAL 2 TIMES DAILY
Status: DISPENSED | OUTPATIENT
Start: 2024-11-18

## 2024-11-18 ASSESSMENT — COGNITIVE AND FUNCTIONAL STATUS - GENERAL
DRESSING REGULAR LOWER BODY CLOTHING: A LITTLE
MOBILITY SCORE: 16
HELP NEEDED FOR BATHING: A LITTLE
DRESSING REGULAR UPPER BODY CLOTHING: A LITTLE
WALKING IN HOSPITAL ROOM: A LOT
WALKING IN HOSPITAL ROOM: A LOT
TOILETING: A LITTLE
MOBILITY SCORE: 16
DRESSING REGULAR UPPER BODY CLOTHING: A LITTLE
CLIMB 3 TO 5 STEPS WITH RAILING: TOTAL
TOILETING: A LITTLE
DAILY ACTIVITIY SCORE: 18
EATING MEALS: A LITTLE
MOVING TO AND FROM BED TO CHAIR: A LITTLE
DRESSING REGULAR LOWER BODY CLOTHING: A LITTLE
EATING MEALS: A LITTLE
MOVING TO AND FROM BED TO CHAIR: A LITTLE
STANDING UP FROM CHAIR USING ARMS: A LOT
HELP NEEDED FOR BATHING: A LITTLE
CLIMB 3 TO 5 STEPS WITH RAILING: TOTAL
STANDING UP FROM CHAIR USING ARMS: A LOT
PERSONAL GROOMING: A LITTLE
PERSONAL GROOMING: A LITTLE
DAILY ACTIVITIY SCORE: 18

## 2024-11-18 ASSESSMENT — PAIN SCALES - GENERAL
PAINLEVEL_OUTOF10: 0 - NO PAIN

## 2024-11-18 ASSESSMENT — PAIN - FUNCTIONAL ASSESSMENT
PAIN_FUNCTIONAL_ASSESSMENT: WONG-BAKER FACES
PAIN_FUNCTIONAL_ASSESSMENT: WONG-BAKER FACES

## 2024-11-18 NOTE — DISCHARGE SUMMARY
DISCHARGE DIAGNOSIS     Chronic hypoxemic respiratory failure s/p trach  Respiratory secretions    HOSPITAL COURSE AND DETAILS     39 year old female with history of MRDD admitted from group home for increased secretions. She was improved and stable at baseline O2 reqs through trach in ED. She was admitted because her group home did not have adequate nursing care over the weekend, and refused to accept her back. She is stable for discharge back to her group home.     DISCHARGE PHYSICAL EXAM     Last Recorded Vitals:  Vitals:    11/18/24 0055 11/18/24 0713 11/18/24 0719 11/18/24 0720   BP: 108/66   126/76   Patient Position:    Lying   Pulse: 70      Resp: 16   20   Temp: 36.7 °C (98.1 °F)   36.6 °C (97.9 °F)   TempSrc:       SpO2: 99% 97% 97% 97%   Weight:       Height:           Physical Exam:  Gen: NAD  HEENT: EOM, MMM  CV: RRR, no murmurs rubs or gallops  Resp: scattered rhonchi b/l, trach in place  Abdomen: soft, NT,+BS, peg in place   LE: No edema      PERTINENT LABS AND IMAGING     Results for orders placed or performed during the hospital encounter of 11/15/24 (from the past 96 hours)   CBC and Auto Differential   Result Value Ref Range    WBC 11.6 (H) 4.4 - 11.3 x10*3/uL    nRBC 0.0 0.0 - 0.0 /100 WBCs    RBC 4.29 4.00 - 5.20 x10*6/uL    Hemoglobin 13.4 12.0 - 16.0 g/dL    Hematocrit 41.0 36.0 - 46.0 %    MCV 96 80 - 100 fL    MCH 31.2 26.0 - 34.0 pg    MCHC 32.7 32.0 - 36.0 g/dL    RDW 12.2 11.5 - 14.5 %    Platelets 377 150 - 450 x10*3/uL    Neutrophils % 65.0 40.0 - 80.0 %    Immature Granulocytes %, Automated 0.3 0.0 - 0.9 %    Lymphocytes % 26.3 13.0 - 44.0 %    Monocytes % 6.0 2.0 - 10.0 %    Eosinophils % 2.1 0.0 - 6.0 %    Basophils % 0.3 0.0 - 2.0 %    Neutrophils Absolute 7.52 1.20 - 7.70 x10*3/uL    Immature Granulocytes Absolute, Automated 0.03 0.00 - 0.70 x10*3/uL    Lymphocytes Absolute 3.04 1.20 - 4.80 x10*3/uL    Monocytes Absolute 0.69 0.10 - 1.00 x10*3/uL    Eosinophils Absolute 0.24 0.00  - 0.70 x10*3/uL    Basophils Absolute 0.04 0.00 - 0.10 x10*3/uL   Comprehensive metabolic panel   Result Value Ref Range    Glucose 84 74 - 99 mg/dL    Sodium 138 136 - 145 mmol/L    Potassium 3.8 3.5 - 5.3 mmol/L    Chloride 103 98 - 107 mmol/L    Bicarbonate 28 21 - 32 mmol/L    Anion Gap 11 10 - 20 mmol/L    Urea Nitrogen 12 6 - 23 mg/dL    Creatinine 0.59 0.50 - 1.05 mg/dL    eGFR >90 >60 mL/min/1.73m*2    Calcium 9.2 8.6 - 10.3 mg/dL    Albumin 3.6 3.4 - 5.0 g/dL    Alkaline Phosphatase 69 33 - 110 U/L    Total Protein 6.7 6.4 - 8.2 g/dL    AST 13 9 - 39 U/L    Bilirubin, Total 0.3 0.0 - 1.2 mg/dL    ALT 11 7 - 45 U/L   Sars-CoV-2 PCR   Result Value Ref Range    Coronavirus 2019, PCR Not Detected Not Detected   Influenza A, and B PCR   Result Value Ref Range    Flu A Result Not Detected Not Detected    Flu B Result Not Detected Not Detected   ECG 12 lead   Result Value Ref Range    Ventricular Rate 91 BPM    Atrial Rate 91 BPM    GA Interval 132 ms    QRS Duration 66 ms    QT Interval 374 ms    QTC Calculation(Bazett) 460 ms    P Axis 69 degrees    R Axis 79 degrees    T Axis 59 degrees    QRS Count 15 beats    Q Onset 224 ms    P Onset 158 ms    P Offset 204 ms    T Offset 411 ms    QTC Fredericia 430 ms        XR chest 1 view   Final Result   1.  No acute findings on single AP view of the chest.   Signed by Andrews Joe MD          No echocardiogram results found for the past 14 days    DISCHARGE MEDICATIONS        Your medication list        START taking these medications        Instructions Last Dose Given Next Dose Due   ipratropium-albuteroL 0.5-2.5 mg/3 mL nebulizer solution  Commonly known as: Duo-Neb      Take 3 mL by nebulization 3 times a day.       oxygen gas therapy  Commonly known as: O2      Inhale 1 each every 12 hours.              CONTINUE taking these medications        Instructions Last Dose Given Next Dose Due   albuterol 2.5 mg /3 mL (0.083 %) nebulizer solution           budesonide 0.5  mg/2 mL nebulizer solution  Commonly known as: Pulmicort           calcium citrate-vitamin D3 315 mg-5 mcg (200 unit) tablet  Commonly known as: Citracal+D           cimetidine 400 mg tablet  Commonly known as: Tagamet           Flonase Sensimist 27.5 mcg/actuation nasal spray  Generic drug: fluticasone           FLUoxetine 40 mg capsule  Commonly known as: PROzac           glycopyrrolate 1 mg tablet  Commonly known as: Robinul           guaiFENesin 100 mg/5 mL syrup  Commonly known as: Robitussin           hydrOXYzine HCL 50 mg tablet  Commonly known as: Atarax           lubricating eye drops ophthalmic solution           metoclopramide 10 mg tablet  Commonly known as: Reglan           metoprolol succinate XL 25 mg 24 hr tablet  Commonly known as: Toprol-XL           montelukast 10 mg tablet  Commonly known as: Singulair           multivitamin with iron - children's chewable tablet  Commonly known as: Cerovite, Jr           OLANZapine 2.5 mg tablet  Commonly known as: ZyPREXA           omeprazole OTC 20 mg EC tablet  Commonly known as: PriLOSEC OTC           polyethylene glycol 8.5 gram powder in packet  Commonly known as: Glycolax, Miralax      Take 17 g by mouth once daily. Substitute gastrostomy for PO       potassium chloride 20 mEq/15 mL liquid           prazosin 2 mg capsule  Commonly known as: Minipress           traZODone 100 mg tablet  Commonly known as: Desyrel                     Where to Get Your Medications        Information about where to get these medications is not yet available    Ask your nurse or doctor about these medications  ipratropium-albuteroL 0.5-2.5 mg/3 mL nebulizer solution  oxygen gas therapy         OUTPATIENT FOLLOW-UP     No future appointments.

## 2024-11-18 NOTE — PROGRESS NOTES
Speech-Language Pathology                 Therapy Communication Note    Patient Name: Carolina Chowdhury  MRN: [id number]  Department: [facility]  Room: 3/[facility]  Today's Date: 11/18/2024     Discipline: Speech Language Pathology    Missed Visit Reason:  Attempted to see pt for skilled dysphagia tx however pt out of room post rapid response/seizure earlier this date.  Pt currently NPO in post ictal state.   Once pt appropriate for intake recommend place pt back on puree/thin liquid diet. (Liquids were recommended by spoon to control bolus)  Please note pt is total laryngectomy. Can aspirate only if there is a tracheal-esophageal fistula. Pts most recent MBSS was completed 6/13/23 and did not demonstrate evidence of fistula.   MBSS did demonstrate cricopharyngeal tightening, if globus sensation is communicated, further assessment by GI warranted.     Missed Time: Attempt

## 2024-11-18 NOTE — CONSULTS
"Inpatient consult to Neurology  Consult performed by: Trey Torres MD  Consult ordered by: Christiano Sullivan MD          History Of Present Illness  Carolina Chowdhury is a 39 y.o. female presenting with seizure.    She has a hx of MRDD and cerebral palsy, nonverbal but ambulatory at baseline, and lives in a group home. She has been admitted with tracheostomy dysfunction and was planned for discharge today. However this morning her nurse tech observed a seizure. She suddenly stopped and stared off, became pale and nonresponsive, and then had bilateral arm extension, eyes rolled upwards, tonic then clonic shaking, and was unresponsive afterwards. The episode lasted around 30 seconds.    \"with arms outstretched and jaw clenched. Episode lasted approx 25-40 seconds. Patient was awake with eyes open not responding to verbal/physical stimuli, appeared post-ictal. Saturations maintained/air way patent. BP/HR WNL. Glucose 133, SPO2 95. Patient was placed in seizure precautions. CTH w/ IV contrast ordered. Ammonia, lactate, prolactin, EEG and CPK ordered. Neuro consulted. Patient to remain on medical floor \"    CTH was reviewed and normal. NH3 of 219, lactate 17, WBC 16 shortly after seizure. She was loaded with Keppra 20mg/kg IV.    The patient is now back to baseline and denies previous history of seizure. She does not recall the episode at all.    Past Medical History  Past Medical History:   Diagnosis Date    Essential (primary) hypertension     Hypertension, benign    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    S/P percutaneous endoscopic gastrostomy (PEG) tube placement    Moderate intellectual disabilities     Moderate intellectual disability    Personal history of diseases of the skin and subcutaneous tissue     History of atopic dermatitis    Personal history of other diseases of the circulatory system     History of cardiomegaly    Personal history of other diseases of the circulatory system     History of congestive " heart failure    Personal history of other diseases of the digestive system     History of gastroesophageal reflux (GERD)    Personal history of other diseases of the respiratory system     History of tracheitis    Personal history of other endocrine, nutritional and metabolic disease     History of vitamin D deficiency    Personal history of other mental and behavioral disorders     History of depression    Personal history of other specified conditions 08/10/2021    History of dysphagia    Personal history of urinary (tract) infections     History of urinary tract infection    Post-traumatic stress disorder, unspecified     PTSD (post-traumatic stress disorder)    Ulcer of esophagus without bleeding     Ulcerative esophagitis    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)     Ulcerative proctitis    Unspecified astigmatism, unspecified eye     Astigmatism     Surgical History  Past Surgical History:   Procedure Laterality Date    OTHER SURGICAL HISTORY  08/10/2021    Laryngectomy    OTHER SURGICAL HISTORY  08/10/2021    Tracheostomy     Social History  Social History     Tobacco Use    Smoking status: Never    Smokeless tobacco: Never     Allergies  Amoxicillin, Augmentin [amoxicillin-pot clavulanate], Eggs-apples-oats [nutritional supplement-fiber], Ibuprofen, Milk, Orange juice, Penicillin, Strawberry, and Zofran [ondansetron hcl]  Medications Prior to Admission   Medication Sig Dispense Refill Last Dose/Taking    albuterol 2.5 mg /3 mL (0.083 %) nebulizer solution Take 3 mL (2.5 mg) by nebulization 4 times a day.   11/15/2024    budesonide (Pulmicort) 0.5 mg/2 mL nebulizer solution Take 2 mL (0.5 mg) by nebulization 2 times a day. Rinse mouth with water after use to reduce aftertaste and incidence of candidiasis. Do not swallow.   11/15/2024    calcium citrate-vitamin D3 (Citracal+D) 315 mg-5 mcg (200 unit) tablet 1 tablet by g-tube route once daily.   11/15/2024    cimetidine (Tagamet) 400 mg  tablet 1 tablet (400 mg) by g-tube route 2 times a day. For inappropriate sexual behavior   11/15/2024    FLUoxetine (PROzac) 40 mg capsule 1 capsule (40 mg) by g-tube route 2 times a day.   11/15/2024    fluticasone (Flonase Sensimist) 27.5 mcg/actuation nasal spray Administer 2 sprays into each nostril once daily.   11/15/2024    glycopyrrolate (Robinul) 1 mg tablet 2 tablets (2 mg) by g-tube route 3 times a day.   11/15/2024    guaiFENesin (Robitussin) 100 mg/5 mL syrup 10 mL (200 mg) by g-tube route 2 times a day.   11/15/2024    hydrOXYzine HCL (Atarax) 50 mg tablet 1 tablet (50 mg) by g-tube route 2 times a day.   11/15/2024    lubricating eye drops ophthalmic solution Administer 2 drops into both eyes 2 times a day.   11/15/2024    metoclopramide (Reglan) 10 mg tablet Take 1 tablet (10 mg) by mouth 3 times daily (morning, midday, late afternoon).   11/15/2024    metoprolol succinate XL (Toprol-XL) 25 mg 24 hr tablet Take 0.5 tablets (12.5 mg) by mouth once daily. Do not crush or chew.  Hold HR less than 60  gtube   11/15/2024    montelukast (Singulair) 10 mg tablet Take 1 tablet (10 mg) by mouth once daily at bedtime. gtube   11/15/2024    multivitamin with iron - children's (Cerovite, Jr) chewable tablet Chew 1 tablet once daily. gtube   11/15/2024    OLANZapine (ZyPREXA) 2.5 mg tablet 1 tablet (2.5 mg) by g-tube route 2 times a day.   11/15/2024    omeprazole OTC (PriLOSEC OTC) 20 mg EC tablet Take 1 tablet (20 mg) by mouth 2 times a day. Do not crush, chew, or split.  gtube   11/15/2024    polyethylene glycol (Glycolax, Miralax) 8.5 gram powder in packet Take 17 g by mouth once daily. Substitute gastrostomy for PO 30 packet 0 11/15/2024    potassium chloride 20 mEq/15 mL liquid Take 15 mL (20 mEq) by mouth once daily. gtube   11/15/2024    prazosin (Minipress) 2 mg capsule 1 capsule (2 mg) by g-tube route once daily at bedtime. Night terrors   11/15/2024    traZODone (Desyrel) 100 mg tablet 1 tablet (100  mg) by g-tube route once daily at bedtime.   11/15/2024       Review of Systems  Neurological Exam  Mental Status  Awake. Attention and concentration are normal.  Response appropriately with nod/shake. Nonverbal at baseline. Follows 2-step but not complex commands..    Cranial Nerves  CN II: Visual fields full to confrontation.  CN III, IV, VI: Exodeviation and left hypertropia. Normal saccades. Normal smooth pursuit. Normal lids and orbits bilaterally. Pupils equal round and reactive to light bilaterally.  CN V:  Right: Facial sensation is normal.  Left: Facial sensation is normal on the left.  CN VII: Full and symmetric facial movement.  CN VIII: Hearing is normal.  CN IX, X: Palate elevates symmetrically  CN XI: Shoulder shrug strength is normal.  CN XII: Tongue midline without atrophy or fasciculations.    Motor  Normal muscle bulk throughout. No fasciculations present. Normal muscle tone. No abnormal involuntary movements.                                               Right                     Left   Shoulder abduction               5                          5  Elbow flexion                         5                          5  Elbow extension                    5                          5  Finger flexion                         5                          5  Finger abduction                    5                          5  Hip flexion                              5                          5  Knee flexion                           4+                          5  Knee extension                      4+                          5  Plantarflexion                         5                          5  Dorsiflexion                            4+                          5  RLE limited by pain.    Sensory  Light touch is normal in upper and lower extremities.     Reflexes                                            Right                      Left  Biceps                                 2+                          "2+  Triceps                                2+                         2+  Patellar                                2+                         2+  Achilles                                0                         0  Right Plantar: downgoing  Left Plantar: downgoing    Coordination  Right: Finger-to-nose normal.Left: Finger-to-nose normal.    Physical Exam  Constitutional:       General: She is awake.   Eyes:      General: Lids are normal.      Pupils: Pupils are equal, round, and reactive to light.   Neurological:      Deep Tendon Reflexes:      Reflex Scores:       Tricep reflexes are 2+ on the right side and 2+ on the left side.       Bicep reflexes are 2+ on the right side and 2+ on the left side.       Patellar reflexes are 2+ on the right side and 2+ on the left side.       Achilles reflexes are 0 on the right side and 0 on the left side.      Last Recorded Vitals  Blood pressure 112/58, pulse 101, temperature 37 °C (98.6 °F), resp. rate 12, height 1.575 m (5' 2\"), weight 49 kg (108 lb 0.4 oz), SpO2 94%.    Relevant Results                    Kandy Coma Scale  Best Eye Response: Spontaneous  Best Verbal Response: Oriented  Best Motor Response: Follows commands  Kandy Coma Scale Score: 15                 I have personally reviewed the following imaging results XR chest 1 view    Result Date: 11/18/2024  Interpreted By:  Schoenberger, Joseph, STUDY: XR CHEST 1 VIEW;  11/18/2024 12:33 pm   INDICATION: Signs/Symptoms:aspiration event.     COMPARISON: 11/15/2024   ACCESSION NUMBER(S): [id number]   ORDERING CLINICIAN: LANCE MIX   FINDINGS:     Tracheostomy cannula in apparent satisfactory position unchanged.   CARDIOMEDIASTINAL SILHOUETTE: Cardiomediastinal silhouette is normal in size and configuration.   LUNGS: The retrocardiac left basal infiltrate apparent on the prior exam has improved. Coarsened lung markings again noted. No new focal lung opacities are seen.   ABDOMEN: No remarkable upper abdominal " findings.   BONES: No acute osseous changes.       1.  Improving left basal infiltrate       MACRO: None   Signed by: Joseph Schoenberger 11/18/2024 12:51 PM Dictation workstation:   [id number]    CT head wo IV contrast    Result Date: 11/18/2024  Interpreted By:  Maura Hunter, STUDY: CT HEAD WO IV CONTRAST; ;  11/18/2024 12:24 pm   INDICATION: Signs/Symptoms:new onset seizure.   COMPARISON: None.   ACCESSION NUMBER(S): [id number]   ORDERING CLINICIAN: LANCE MIX   TECHNIQUE: Serial axial images of the head were obtained without intravenous contrast. Sagittal and coronal reconstructions were generated.   FINDINGS: The ventricles are midline and normal in size.   There are no acute parenchymal abnormalities.   There is no hemorrhage or extra-axial fluid.   There is no obvious scalp hematoma or skull fracture.   Left mastoid air cells appear partially opacified.   There is paranasal sinus disease.   COMPARISON OF FINDINGS:       No acute intracranial abnormality. Paranasal sinus disease.     MACRO: none   Signed by: Maura Hunter 11/18/2024 12:42 PM Dictation workstation:   [id number]    ECG 12 lead    Result Date: 11/18/2024  Normal sinus rhythm Normal ECG When compared with ECG of 16-NOV-2024 13:36, (unconfirmed) No significant change was found Confirmed by Dieter Nielson ([id number]) on 11/18/2024 10:30:21 AM    XR chest 1 view    Result Date: 11/15/2024  STUDY: Chest Radiograph;  11/15/2024 at 4:42 PM INDICATION: Increased trach secretions. COMPARISON: XR chest 06/05/24, 04/27/24, 05/24/23. ACCESSION NUMBER(S): [id number] ORDERING CLINICIAN: YESENIA MUIR TECHNIQUE:  Frontal chest was obtained at 1642 hours. FINDINGS: No acute opacities or effusions are visualized.  Heart size is top normal.  There is no evidence of a pneumothorax.    1.  No acute findings on single AP view of the chest. Signed by Andrews Joe MD  .      Assessment/Plan   Assessment & Plan  Tracheostomy care (Multi)      involved in patient's care    Seizure (Multi)      Impression:  First time generalized seizure in the setting of CP/MRDD    Recommend:  Seizure precautions  EEG  MRI brain w/ and w/o contrast  Continue Keppra 500 mg BID           Trey Torres MD

## 2024-11-18 NOTE — NURSING NOTE
"Attempted to call report to facility. Was cut off and told \"we are not taking her back\". I asked \"is this the nurse\" and the response was \"No Im the supervisor and we are not taking her back\".  SW informed.   "

## 2024-11-18 NOTE — CONSULTS
"Nutrition Initial Assessment:   Nutrition Assessment         Patient is a 39 y.o. female presenting with tracheostomy care      Nutrition History:  Food and Nutrient History: RD consulted for nutrition assessment/recommendation, enteral assessment/recommendation, initiate and manage tube feeding. Has a history of HTN, MRDD, CP, s/p trach and PEG, GERD. S/p rapid response this morning d/t seizure. Was on pureed diet with thin liquids, spoon fed only and Osmolite 1.5 240 ml at 8 PM prior to seizure. Per SLP note, plans for pureed diet with thin liquids, spoon fed only once able to resume diet. Attempted to call group home to inquire about diet/tube feeding regimen though unable to speak with staff. Was receiving Jevity 1.5 boluses during admissions in 2023. Per RD note in May 2023, pt had allergies to milk, apple and oats in chart though pt denied being allergic to these. Allergies currently in chart, though appears to have been tolerating Osmolite 1.5 this admission even though it contains milk, likely not a true allergy. If pt eats >50% at meals, do not give TF bolus. If pt eats <50% at meals, give 240 ml bolus of Osmolite 1.5 following meal. Regardless of intakes, give one 240 ml bolus of Osmolite 1.5 at 8 pm. Flush with 50 ml water before and 50 ml water after bolus feeds.  Vitamin/Herbal Supplement Use: calcium carbonate, multivitamin, potassium chloride, per provider note gets Nutren, though unclear which formula and what regimen is.  Food Allergies/Intolerances:   eggs, apple, oat, milk, strawberries, orange juice. Question accuracy of oat, milk, and apple allergies as a RD has asked at a previous admission and pt denied being allergic to these. Appears to have tolerated Osmolite 1.5 this admission. Of note, apparently receives Nutren outpatient, which would also contain milk.  GI Symptoms: None  Oral Problems:  SLP following       Anthropometrics:  Height: 157.5 cm (5' 2\")   Weight: 49 kg (108 lb 0.4 oz)   BMI " (Calculated): 19.75             Weight History:     Wt Readings from Last 10 Encounters:   11/15/24 49 kg (108 lb 0.4 oz)   06/26/24 49.9 kg (110 lb)   06/05/24 49.9 kg (110 lb)   06/03/24 (!) 37 kg (81 lb 9.1 oz)   04/27/24 (!) 36.3 kg (80 lb)   03/15/24 (!) 40 kg (88 lb 2.9 oz)   10/22/21 36.1 kg (79 lb 9.6 oz)   08/27/21 37.6 kg (83 lb)   07/31/21 (!) 43 kg (94 lb 12.8 oz)         Weight Change %:  Significant Weight Loss: No    Nutrition Focused Physical Exam Findings:  defer: pt not available    Edema:  Edema: none  Physical Findings:  Skin: Negative (for PI)    Nutrition Significant Labs:  BMP Trend:   Results from last 7 days   Lab Units 11/18/24  1354 11/18/24  1131 11/15/24  1610   GLUCOSE mg/dL 103* 112* 84   CALCIUM mg/dL 8.2* 9.3 9.2   SODIUM mmol/L 136 139 138   POTASSIUM mmol/L 3.8 3.6 3.8   CO2 mmol/L 25 9* 28   CHLORIDE mmol/L 107 104 103   BUN mg/dL 10 10 12   CREATININE mg/dL 0.65 0.79 0.59        Nutrition Specific Medications:  Reviewed    I/O:    ;      Dietary Orders (From admission, onward)       Start     Ordered    11/18/24 1140  NPO Diet; Effective now  Diet effective now         11/18/24 1139    11/17/24 0827  May Not Participate in Room Service  ( ROOM SERVICE MAY NOT PARTICIPATE)  Once        Question:  .  Answer:  Yes    11/17/24 0826                     Estimated Needs:   Total Energy Estimated Needs (kCal): 1470 kCal  Method for Estimating Needs: 30 kcal/kg  Total Protein Estimated Needs (g): 59 g  Method for Estimating Needs: 1.2 g/kg  Total Fluid Estimated Needs (mL): 1470 mL  Method for Estimating Needs: 1 ml/kcal or per MD        Nutrition Diagnosis   Malnutrition Diagnosis  Patient has Malnutrition Diagnosis: No (insufficient data to diagnose)    Nutrition Diagnosis  Patient has Nutrition Diagnosis: Yes  Diagnosis Status (1): New  Nutrition Diagnosis 1: Swallowing difficulity  Related to (1): h/o MRDD and CP s/p trach and PEG  As Evidenced by (1): SLP recommending pureed/thin  with liquids via spoon only, need for supplemental TF       Nutrition Interventions/Recommendations         Nutrition Prescription:  Individualized Nutrition Prescription Provided for : NPO per MD.   When okay to restart diet, recommend restart SLP recommendations for pureed/thin with 1:1 feeding, no straw, spoon fed only.   Enteral nutrition recommendations using Osmolite 1.5: If pt eats >50% at meals, do not give TF bolus. If pt eats <50% at meals, give 240 ml bolus of Osmolite 1.5 following meal. Regardless of intakes, give one 240 ml bolus of Osmolite 1.5 at 8 pm.   Flush with 50 ml water before and 50 ml water after bolus feeds.        Nutrition Interventions:   Interventions: Meals and snacks, Enteral intake  Goal: Consumes 3 meals per day  Enteral Intake: Modify concentration of enteral nutrition, Modify rate of enteral nutrition  Goal: Tolerate bolus feeds (see nutrition prescription for frequency). One 240 ml bolus of Osmolite 1.5 providees 360 kcal, 15g protein, 183 ml free water). Recommend 50 ml water flush before and after each bolus.    Collaboration and Referral of Nutrition Care: Collaboration by nutrition professional with other providers  Goal: secure chat to MD    Nutrition Education:      Not appropriate. From group home with plans for facility at discharge.    Nutrition Monitoring and Evaluation   Food/Nutrient Related History Monitoring  Monitoring and Evaluation Plan: Energy intake, Amount of food, Fluid intake, Enteral and parenteral nutrition intake  Energy Intake: Estimated energy intake  Criteria: Pt meets >75% of estimated energy needs  Fluid Intake: Estimated fluid intake  Criteria: Meets >75% of estimated fluid needs  Amount of Food: Estimated amout of food, Medical food intake  Criteria: Pt consumes >75% of meals and supplements  Enteral and Parenteral Nutrition Intake: Enteral nutrition intake  Criteria: Tolerate TF boluses    Body Composition/Growth/Weight History  Monitoring and  Evaluation Plan: Weight  Weight: Measured weight  Criteria: Maintains stable weight    Biochemical Data, Medical Tests and Procedures  Monitoring and Evaluation Plan: Glucose/endocrine profile, Electrolyte/renal panel  Electrolyte and Renal Panel: Sodium, Potassium, Phosphorus  Criteria: Electrolytes WNL  Glucose/Endocrine Profile: Glucose, casual  Criteria: BG within desirable range              Time Spent (min): 45 minutes     How Severe Is This Condition?: moderate

## 2024-11-18 NOTE — PROGRESS NOTES
11/18/24 1252   Discharge Planning   Home or Post Acute Services Post acute facilities (Rehab/SNF/etc)   Type of Post Acute Facility Services Long term care;Skilled nursing   Does the patient need discharge transport arranged? Yes   RoundTrip coordination needed? Yes   Has discharge transport been arranged? No   Patient Choice   Provider Choice list and CMS website (https://medicare.gov/care-compare#search) for post-acute Quality and Resource Measure Data were provided and reviewed with: Family     Referral previously sent to [facility], still waiting for facility to confirm if they are able to accept or not. SW sent referral to additional SNF/LTC locations as well to confirm which facilities are able to accommodate pt/manage care needs. Pt unable to return to previous group home setting at this time. Pt guardian to be updated on accepting facilities once facility responses are received.     Update- Guardian updated on accepting facilities, [facility] and [facility]. Guardian requesting [facility] as [facility], facility updated and will need to order trach supplies. ADOD possible for tomorrow pending MRI.

## 2024-11-18 NOTE — PROGRESS NOTES
SW called and spoke with brother/ guardifredis Pepe. He reports that the group home and  were working on finding another group home to accept. He reports that 2 night nurses quit and another night nurse retired last day was Friday. He was in agreement for patient to return to the group home [facility]. When nurse tried to call report refused to take the patient back. Bedside nurse informed SW that the group home nursing supervisor reports that she was given a 30 day notice last day was Friday November 15th. SW then followed up with patients brother/ guardifredis Pepe and informed him group home continues to refuse to accept back. SW asked if a 30 day notice was issued. He reports that awhile back he was notified need to look for another group home but have not found one to accept. He reports that he does not have a copy of the 30 day discharge notice. LYLA Osorio    Update 1231 SW notified by attending no longer medically clear for discharge. She had a seizure. ROCKY called [facility] [phone] for assistance and waiting for a return call from Mckenna Donis or Anusha Carlos.  Also called and left message with [facility] to verify  for patient. LYLA Osorio

## 2024-11-18 NOTE — SIGNIFICANT EVENT
Rapid response called at approximately 11:25. Patient was noted to have a potential seizure event with arms outstretched and jaw clenched. Episode lasted approx 25-40 seconds. Patient was awake with eyes open not responding to verbal/physical stimuli, appeared post-ictal.  Saturations maintained/air way patent. BP/HR WNL. Glucose 133, SPO2 95. Patient was placed in seizure precautions. CTH w/ IV contrast ordered. Ammonia, lactate, prolactin, EEG and CPK ordered. Neuro consulted. Patient to remain on medical floor

## 2024-11-18 NOTE — PROGRESS NOTES
"Carolina Chowdhury is a 39 y.o. female on day 0 of admission presenting with increased secretions from group home      [facility]   IDRIS. No acute complaints.     Objective     Last Recorded Vitals  /76 (Patient Position: Lying)   Pulse 70   Temp 36.6 °C (97.9 °F)   Resp 20   Ht 1.575 m (5' 2\")   Wt 49 kg (108 lb 0.4 oz)   SpO2 97%   BMI 19.76 kg/m²      Intake/Output last 3 Shifts:  No intake or output data in the 24 hours ending 11/18/24 0822      Scheduled medications  budesonide, 0.5 mg, nebulization, BID  famotidine, 20 mg, oral, BID  FLUoxetine, 40 mg, oral, BID  glycopyrrolate, 2 mg, oral, TID  guaiFENesin, 200 mg, oral, BID  hydrOXYzine HCL, 50 mg, oral, BID  ipratropium-albuteroL, 3 mL, nebulization, TID  lubricating eye drops, 2 drop, Both Eyes, BID  metoclopramide, 10 mg, oral, TID  metoprolol succinate XL, 12.5 mg, oral, Daily  montelukast, 10 mg, oral, Nightly  OLANZapine, 2.5 mg, oral, BID  oxygen, , inhalation, Continuous - Inhalation  pantoprazole, 40 mg, oral, BID AC  polyethylene glycol, 17 g, oral, Daily  prazosin, 2 mg, oral, Nightly  traZODone, 100 mg, oral, Nightly      Continuous medications     PRN medications  PRN medications: acetaminophen **OR** acetaminophen    Physical Exam   Gen: NAD  HEENT: EOM, MMM  CV: RRR, no murmurs rubs or gallops  Resp: scattered rhonchi b/l, trach in place  Abdomen: soft, NT,+BS, peg in place   LE: No edema      Relevant Results  Lab Results   Component Value Date    WBC 11.6 (H) 11/15/2024    HGB 13.4 11/15/2024    HCT 41.0 11/15/2024    MCV 96 11/15/2024     11/15/2024     Lab Results   Component Value Date    GLUCOSE 84 11/15/2024    CALCIUM 9.2 11/15/2024     11/15/2024    K 3.8 11/15/2024    CO2 28 11/15/2024     11/15/2024    BUN 12 11/15/2024    CREATININE 0.59 11/15/2024         Assessment/Plan 39 year old female with history of MRDD admitted from group home for increased secretions    -improved, and now at baseline "     Chronic respiratory failure s/p post tracheostomy  -continue nebs, bronchial hygiene    S/p PEG tube on TF: continue tube feeds night 240ml osmolite (dont have order for nutren here) with free water flush after feed  -also on pureed diet thin liquid at group home during the day  -verified diet plan above with group home  -dietitian and speech consulted here     Social work following for placement    DVT ppx: lovenox    Nikita Wall MD

## 2024-11-19 LAB
ANION GAP SERPL CALC-SCNC: 9 MMOL/L (ref 10–20)
BUN SERPL-MCNC: 8 MG/DL (ref 6–23)
CALCIUM SERPL-MCNC: 8.4 MG/DL (ref 8.6–10.3)
CHLORIDE SERPL-SCNC: 108 MMOL/L (ref 98–107)
CO2 SERPL-SCNC: 26 MMOL/L (ref 21–32)
CREAT SERPL-MCNC: 0.62 MG/DL (ref 0.5–1.05)
EGFRCR SERPLBLD CKD-EPI 2021: >90 ML/MIN/1.73M*2
ERYTHROCYTE [DISTWIDTH] IN BLOOD BY AUTOMATED COUNT: 12.1 % (ref 11.5–14.5)
GLUCOSE SERPL-MCNC: 69 MG/DL (ref 74–99)
HCT VFR BLD AUTO: 40.1 % (ref 36–46)
HGB BLD-MCNC: 13.1 G/DL (ref 12–16)
HOLD SPECIMEN: NORMAL
MAGNESIUM SERPL-MCNC: 1.96 MG/DL (ref 1.6–2.4)
MCH RBC QN AUTO: 31.3 PG (ref 26–34)
MCHC RBC AUTO-ENTMCNC: 32.7 G/DL (ref 32–36)
MCV RBC AUTO: 96 FL (ref 80–100)
NRBC BLD-RTO: 0 /100 WBCS (ref 0–0)
PLATELET # BLD AUTO: 314 X10*3/UL (ref 150–450)
POTASSIUM SERPL-SCNC: 3.9 MMOL/L (ref 3.5–5.3)
RBC # BLD AUTO: 4.19 X10*6/UL (ref 4–5.2)
SODIUM SERPL-SCNC: 139 MMOL/L (ref 136–145)
WBC # BLD AUTO: 7.4 X10*3/UL (ref 4.4–11.3)

## 2024-11-19 PROCEDURE — 2500000005 HC RX 250 GENERAL PHARMACY W/O HCPCS: Performed by: HOSPITALIST

## 2024-11-19 PROCEDURE — 2500000002 HC RX 250 W HCPCS SELF ADMINISTERED DRUGS (ALT 637 FOR MEDICARE OP, ALT 636 FOR OP/ED): Performed by: HOSPITALIST

## 2024-11-19 PROCEDURE — 36415 COLL VENOUS BLD VENIPUNCTURE: CPT | Performed by: INTERNAL MEDICINE

## 2024-11-19 PROCEDURE — 80048 BASIC METABOLIC PNL TOTAL CA: CPT | Performed by: INTERNAL MEDICINE

## 2024-11-19 PROCEDURE — 99231 SBSQ HOSP IP/OBS SF/LOW 25: CPT | Performed by: STUDENT IN AN ORGANIZED HEALTH CARE EDUCATION/TRAINING PROGRAM

## 2024-11-19 PROCEDURE — 2500000001 HC RX 250 WO HCPCS SELF ADMINISTERED DRUGS (ALT 637 FOR MEDICARE OP): Performed by: HOSPITALIST

## 2024-11-19 PROCEDURE — 83735 ASSAY OF MAGNESIUM: CPT | Performed by: INTERNAL MEDICINE

## 2024-11-19 PROCEDURE — 85027 COMPLETE CBC AUTOMATED: CPT | Performed by: INTERNAL MEDICINE

## 2024-11-19 PROCEDURE — 1210000001 HC SEMI-PRIVATE ROOM DAILY

## 2024-11-19 PROCEDURE — 2500000001 HC RX 250 WO HCPCS SELF ADMINISTERED DRUGS (ALT 637 FOR MEDICARE OP): Performed by: INTERNAL MEDICINE

## 2024-11-19 PROCEDURE — 94640 AIRWAY INHALATION TREATMENT: CPT

## 2024-11-19 PROCEDURE — 2500000004 HC RX 250 GENERAL PHARMACY W/ HCPCS (ALT 636 FOR OP/ED): Performed by: INTERNAL MEDICINE

## 2024-11-19 PROCEDURE — 99232 SBSQ HOSP IP/OBS MODERATE 35: CPT | Performed by: HOSPITALIST

## 2024-11-19 ASSESSMENT — COGNITIVE AND FUNCTIONAL STATUS - GENERAL
TOILETING: A LITTLE
PERSONAL GROOMING: A LITTLE
CLIMB 3 TO 5 STEPS WITH RAILING: TOTAL
EATING MEALS: A LITTLE
DAILY ACTIVITIY SCORE: 18
MOBILITY SCORE: 16
STANDING UP FROM CHAIR USING ARMS: A LOT
DRESSING REGULAR UPPER BODY CLOTHING: A LITTLE
MOVING TO AND FROM BED TO CHAIR: A LITTLE
WALKING IN HOSPITAL ROOM: A LOT
DAILY ACTIVITIY SCORE: 18
EATING MEALS: A LITTLE
TOILETING: A LITTLE
DRESSING REGULAR LOWER BODY CLOTHING: A LITTLE
HELP NEEDED FOR BATHING: A LITTLE
PERSONAL GROOMING: A LITTLE
STANDING UP FROM CHAIR USING ARMS: A LOT
HELP NEEDED FOR BATHING: A LITTLE
CLIMB 3 TO 5 STEPS WITH RAILING: TOTAL
WALKING IN HOSPITAL ROOM: A LOT
DRESSING REGULAR UPPER BODY CLOTHING: A LITTLE
MOVING TO AND FROM BED TO CHAIR: A LITTLE
DRESSING REGULAR LOWER BODY CLOTHING: A LITTLE
MOBILITY SCORE: 16

## 2024-11-19 ASSESSMENT — PAIN SCALES - GENERAL
PAINLEVEL_OUTOF10: 0 - NO PAIN
PAINLEVEL_OUTOF10: 0 - NO PAIN
PAINLEVEL_OUTOF10: 1

## 2024-11-19 ASSESSMENT — PAIN DESCRIPTION - ORIENTATION: ORIENTATION: MID

## 2024-11-19 ASSESSMENT — PAIN - FUNCTIONAL ASSESSMENT: PAIN_FUNCTIONAL_ASSESSMENT: WONG-BAKER FACES

## 2024-11-19 NOTE — PROGRESS NOTES
11/19/24 1459   Discharge Planning   Type of Post Acute Facility Services Long term care   Expected Discharge Disposition Inter  ([facility])   Does the patient need discharge transport arranged? Yes   RoundTrip coordination needed? Yes   Has discharge transport been arranged? No     Updated clinical information sent to the facility. ADOD is tomorrow 11/20, facility aware.

## 2024-11-19 NOTE — PROGRESS NOTES
Speech-Language Pathology                 Therapy Communication Note    Patient Name: Carolina Chowdhury  MRN: [id number]  Department: [facility]  Room: [facility]-A  Today's Date: 11/19/2024     Discipline: Speech Language Pathology    Missed Visit Reason: Pt off floor for MRI. Will attempt at later time/date.     Missed Time: Attempt    Comment:

## 2024-11-19 NOTE — CARE PLAN
Problem: Fall/Injury  Goal: Not fall by end of shift  Outcome: Progressing     The patient's goals for the shift include comfort/safety    The clinical goals for the shift include safety

## 2024-11-19 NOTE — PROGRESS NOTES
"Carolina Chowdhury is a 39 y.o. female on day 1 of admission presenting with Tracheostomy care (Multi).      Subjective   No further seizures. Discussed plan of care with patient's cousin       Objective     Last Recorded Vitals  Blood pressure 106/65, pulse 59, temperature 36.7 °C (98.1 °F), resp. rate 16, height 1.575 m (5' 2\"), weight 49 kg (108 lb 0.4 oz), SpO2 97%.    Physical Exam  Neurological Exam  Relevant Results                    Kandy Coma Scale  Best Eye Response: Spontaneous  Best Verbal Response: Oriented  Best Motor Response: Follows commands  Kandy Coma Scale Score: 15                             Assessment/Plan      Assessment & Plan  Tracheostomy care (Multi)     involved in patient's care    Seizure (Multi)    Impression:  First time generalized seizure in the setting of CP/MRDD     Recommend:  Continue Keppra 500 mg BID  OK to discharge, will follow result of EEG  Followup with Lilly Torres MD  "

## 2024-11-19 NOTE — PROGRESS NOTES
"Carolina Chowdhury is a 39 y.o. female on day 1 of admission presenting with increased secretions from group home      Subjective     Seen and examined   MRDD at baseline  Alert and awake   follow commands    Had seizure last night   No seizure since then   No fever     Objective     Last Recorded Vitals  /65   Pulse 59   Temp 36.7 °C (98.1 °F)   Resp 16   Ht 1.575 m (5' 2\")   Wt 49 kg (108 lb 0.4 oz)   SpO2 97%   BMI 19.76 kg/m²      Intake/Output last 3 Shifts:    Intake/Output Summary (Last 24 hours) at 11/19/2024 0840  Last data filed at 11/19/2024 0600  Gross per 24 hour   Intake 759.16 ml   Output 50 ml   Net 709.16 ml         Scheduled medications  budesonide, 0.5 mg, nebulization, BID  enoxaparin, 40 mg, subcutaneous, q24h  famotidine, 20 mg, oral, BID  FLUoxetine, 40 mg, oral, BID  glycopyrrolate, 2 mg, oral, TID  guaiFENesin, 200 mg, oral, BID  hydrOXYzine HCL, 50 mg, oral, BID  ipratropium-albuteroL, 3 mL, nebulization, TID  levETIRAcetam, 500 mg, g-tube, BID  lubricating eye drops, 2 drop, Both Eyes, BID  metoclopramide, 10 mg, oral, TID  metoprolol succinate XL, 12.5 mg, oral, Daily  montelukast, 10 mg, oral, Nightly  OLANZapine, 2.5 mg, oral, BID  oxygen, , inhalation, Continuous - Inhalation  pantoprazole, 40 mg, intravenous, BID  polyethylene glycol, 17 g, oral, Daily  prazosin, 2 mg, oral, Nightly  traZODone, 100 mg, oral, Nightly      Continuous medications  sodium chloride 0.9%, 50 mL/hr, Last Rate: 50 mL/hr (11/18/24 1749)      PRN medications  PRN medications: acetaminophen **OR** acetaminophen    Physical Exam   Gen: NAD  Neck: Chronic trach  HEENT: EOM, MMM  CV: RRR, no murmurs rubs or gallops  Resp: scattered rhonchi b/l, trach in place  Abdomen: soft, NT,+BS, peg in place   LE: No edema  Neuro: Alert and awake.  Follow commands no focal deficit  Skin exam no lesions    Relevant Results  Lab Results   Component Value Date    WBC 7.4 11/19/2024    HGB 13.1 11/19/2024    HCT 40.1 " 11/19/2024    MCV 96 11/19/2024     11/19/2024     Lab Results   Component Value Date    GLUCOSE 69 (L) 11/19/2024    CALCIUM 8.4 (L) 11/19/2024     11/19/2024    K 3.9 11/19/2024    CO2 26 11/19/2024     (H) 11/19/2024    BUN 8 11/19/2024    CREATININE 0.62 11/19/2024         Assessment/Plan     Follow commands  39 year old female with history of MRDD admitted from group home for increased secretions     Chronic respiratory failure s/p post tracheostomy  Trach care  No fever  CBC improved down to 7.4  Saturation 97%    Seizure disorder  No more seizures  Seizure precaution   Keppra 1000 IV 1 time given last  Keppra p.o. twice daily  Neurology is following   MRI brain     S/p PEG tube on TF:   continue tube feeds night 240ml osmolite (dont have order for nutren here) with free water flush after feed  -also on pureed diet thin liquid at group home during the day   she passed swallow eval    Intellectually challenging    [facility]    Social work following for placement    DVT ppx: lovenox    DC planning: [facility]     Floyd Decker MD

## 2024-11-19 NOTE — PROGRESS NOTES
Respiratory Therapy Note    Suctioned pt after we got back from CT. While I was suctioning, pt bit my hand.

## 2024-11-19 NOTE — CARE PLAN
The patient's goals for the shift include comfort/safety    The clinical goals for the shift include Patient will remain safe throughout shift

## 2024-11-20 PROCEDURE — 92526 ORAL FUNCTION THERAPY: CPT | Mod: GN

## 2024-11-20 PROCEDURE — 2500000004 HC RX 250 GENERAL PHARMACY W/ HCPCS (ALT 636 FOR OP/ED): Performed by: INTERNAL MEDICINE

## 2024-11-20 PROCEDURE — 2500000005 HC RX 250 GENERAL PHARMACY W/O HCPCS: Performed by: HOSPITALIST

## 2024-11-20 PROCEDURE — 99239 HOSP IP/OBS DSCHRG MGMT >30: CPT | Performed by: HOSPITALIST

## 2024-11-20 PROCEDURE — 2500000001 HC RX 250 WO HCPCS SELF ADMINISTERED DRUGS (ALT 637 FOR MEDICARE OP): Performed by: HOSPITALIST

## 2024-11-20 PROCEDURE — 94640 AIRWAY INHALATION TREATMENT: CPT

## 2024-11-20 PROCEDURE — 2500000002 HC RX 250 W HCPCS SELF ADMINISTERED DRUGS (ALT 637 FOR MEDICARE OP, ALT 636 FOR OP/ED): Performed by: HOSPITALIST

## 2024-11-20 PROCEDURE — 1210000001 HC SEMI-PRIVATE ROOM DAILY

## 2024-11-20 PROCEDURE — 2500000001 HC RX 250 WO HCPCS SELF ADMINISTERED DRUGS (ALT 637 FOR MEDICARE OP): Performed by: STUDENT IN AN ORGANIZED HEALTH CARE EDUCATION/TRAINING PROGRAM

## 2024-11-20 PROCEDURE — 2500000001 HC RX 250 WO HCPCS SELF ADMINISTERED DRUGS (ALT 637 FOR MEDICARE OP): Performed by: INTERNAL MEDICINE

## 2024-11-20 RX ORDER — LEVETIRACETAM 100 MG/ML
500 SOLUTION ORAL 2 TIMES DAILY
Qty: 30 ML | Refills: 1 | Status: SHIPPED | OUTPATIENT
Start: 2024-11-20

## 2024-11-20 RX ORDER — PANTOPRAZOLE SODIUM 40 MG/1
40 TABLET, DELAYED RELEASE ORAL
Status: DISPENSED | OUTPATIENT
Start: 2024-11-20

## 2024-11-20 ASSESSMENT — COGNITIVE AND FUNCTIONAL STATUS - GENERAL
DAILY ACTIVITIY SCORE: 13
TURNING FROM BACK TO SIDE WHILE IN FLAT BAD: A LITTLE
EATING MEALS: A LITTLE
STANDING UP FROM CHAIR USING ARMS: A LOT
MOBILITY SCORE: 13
WALKING IN HOSPITAL ROOM: TOTAL
DRESSING REGULAR UPPER BODY CLOTHING: A LOT
TOILETING: A LOT
CLIMB 3 TO 5 STEPS WITH RAILING: TOTAL
HELP NEEDED FOR BATHING: A LOT
MOVING TO AND FROM BED TO CHAIR: A LOT
DRESSING REGULAR LOWER BODY CLOTHING: A LOT
PERSONAL GROOMING: A LOT

## 2024-11-20 ASSESSMENT — PAIN SCALES - GENERAL: PAINLEVEL_OUTOF10: 0 - NO PAIN

## 2024-11-20 NOTE — DISCHARGE SUMMARY
Discharge Diagnosis  Tracheostomy care (Multi)    Issues Requiring Follow-Up  None    Discharge Meds     Medication List      START taking these medications     ipratropium-albuteroL 0.5-2.5 mg/3 mL nebulizer solution; Commonly known   as: Duo-Neb; Take 3 mL by nebulization 3 times a day.   levETIRAcetam 100 mg/mL solution; Commonly known as: Keppra; 5 mL (500   mg) by g-tube route 2 times a day.   oxygen gas therapy; Commonly known as: O2; Inhale 1 each every 12 hours.     CONTINUE taking these medications     albuterol 2.5 mg /3 mL (0.083 %) nebulizer solution   budesonide 0.5 mg/2 mL nebulizer solution; Commonly known as: Pulmicort   calcium citrate-vitamin D3 315 mg-5 mcg (200 unit) tablet; Commonly   known as: Citracal+D   cimetidine 400 mg tablet; Commonly known as: Tagamet   Flonase Sensimist 27.5 mcg/actuation nasal spray; Generic drug:   fluticasone   FLUoxetine 40 mg capsule; Commonly known as: PROzac   glycopyrrolate 1 mg tablet; Commonly known as: Robinul   guaiFENesin 100 mg/5 mL syrup; Commonly known as: Robitussin   hydrOXYzine HCL 50 mg tablet; Commonly known as: Atarax   lubricating eye drops ophthalmic solution   metoclopramide 10 mg tablet; Commonly known as: Reglan   metoprolol succinate XL 25 mg 24 hr tablet; Commonly known as: Toprol-XL   montelukast 10 mg tablet; Commonly known as: Singulair   multivitamin with iron - children's chewable tablet; Commonly known as:   Cerovite, Jr   OLANZapine 2.5 mg tablet; Commonly known as: ZyPREXA   omeprazole OTC 20 mg EC tablet; Commonly known as: PriLOSEC OTC   polyethylene glycol 8.5 gram powder in packet; Commonly known as:   Glycolax, Miralax; Take 17 g by mouth once daily. Substitute gastrostomy   for PO   potassium chloride 20 mEq/15 mL liquid   prazosin 2 mg capsule; Commonly known as: Minipress   traZODone 100 mg tablet; Commonly known as: Desyrel       Test Results Pending At Discharge  Pending Labs       No current pending labs.       "      Hospital Course       Carolina Chowdhruy is a 39 y.o. female on day 1 of admission presenting with increased secretions from group home           Subjective  Seen and examined   MRDD at baseline  Alert and awake   follow commands    Had seizure last night   No seizure since then   No fever         Objective  Last Recorded Vitals  /65   Pulse 59   Temp 36.7 °C (98.1 °F)   Resp 16   Ht 1.575 m (5' 2\")   Wt 49 kg (108 lb 0.4 oz)   SpO2 97%   BMI 19.76 kg/m²       Intake/Output last 3 Shifts:     Intake/Output Summary (Last 24 hours) at 11/19/2024 0840  Last data filed at 11/19/2024 0600      Gross per 24 hour   Intake 759.16 ml   Output 50 ml   Net 709.16 ml            Scheduled medications    Scheduled Medications   budesonide, 0.5 mg, nebulization, BID  enoxaparin, 40 mg, subcutaneous, q24h  famotidine, 20 mg, oral, BID  FLUoxetine, 40 mg, oral, BID  glycopyrrolate, 2 mg, oral, TID  guaiFENesin, 200 mg, oral, BID  hydrOXYzine HCL, 50 mg, oral, BID  ipratropium-albuteroL, 3 mL, nebulization, TID  levETIRAcetam, 500 mg, g-tube, BID  lubricating eye drops, 2 drop, Both Eyes, BID  metoclopramide, 10 mg, oral, TID  metoprolol succinate XL, 12.5 mg, oral, Daily  montelukast, 10 mg, oral, Nightly  OLANZapine, 2.5 mg, oral, BID  oxygen, , inhalation, Continuous - Inhalation  pantoprazole, 40 mg, intravenous, BID  polyethylene glycol, 17 g, oral, Daily  prazosin, 2 mg, oral, Nightly  traZODone, 100 mg, oral, Nightly         Continuous medications    Continuous Medications   sodium chloride 0.9%, 50 mL/hr, Last Rate: 50 mL/hr (11/18/24 1749)         PRN medications  PRN Medications   PRN medications: acetaminophen **OR** acetaminophen        Physical Exam   Gen: NAD  Neck: Chronic trach  HEENT: EOM, MMM  CV: RRR, no murmurs rubs or gallops  Resp: scattered rhonchi b/l, trach in place  Abdomen: soft, NT,+BS, peg in place   LE: No edema  Neuro: Alert and awake.  Follow commands no focal deficit  Skin exam no " lesions     Relevant Results        Lab Results   Component Value Date     WBC 7.4 11/19/2024     HGB 13.1 11/19/2024     HCT 40.1 11/19/2024     MCV 96 11/19/2024      11/19/2024            Lab Results   Component Value Date     GLUCOSE 69 (L) 11/19/2024     CALCIUM 8.4 (L) 11/19/2024      11/19/2024     K 3.9 11/19/2024     CO2 26 11/19/2024      (H) 11/19/2024     BUN 8 11/19/2024     CREATININE 0.62 11/19/2024               Assessment/Plan  Follow commands  39 year old female with history of MRDD admitted from group home for increased secretions      Chronic respiratory failure s/p post tracheostomy  Trach care/ pt has 9mm trach cuffed with PRN suctioning   No fever  CBC improved down to 7.4  Saturation 97%     Seizure disorder  No more seizures  Seizure precaution   Keppra p.o. twice daily 500 mg   Neurology is following   MRI brain :Severely limited study due to incomplete exam (no  DWI or GRE images), and severely motion degraded T1, T2, and FLAIR  images.     S/p PEG tube on TF:   continue tube feeds night 240ml osmolite (dont have order for nutren here) with free water flush after feed  -also on pureed diet thin liquid at group home during the day   she passed swallow eval     Intellectually challenging     Anxiety continue with the [employer]     Social work following for placement     DVT ppx: lovenox     DC planning: SNF     Pertinent Physical Exam At Time of Discharge  Physical Exam  Gen: NAD  Neck: Chronic trach  HEENT: EOM, MMM  CV: RRR, no murmurs rubs or gallops  Resp: scattered rhonchi b/l, trach in place  Abdomen: soft, NT,+BS, peg in place   LE: No edema  Neuro: Alert and awake.  Follow commands no focal deficit  Skin exam no lesions      Outpatient Follow-Up  No future appointments.    Discharge time >30 min   Floyd Decker MD

## 2024-11-20 NOTE — CARE PLAN
The patient's goals for the shift include comfort/safety    The clinical goals for the shift include comfort/safety

## 2024-11-20 NOTE — CARE PLAN
Problem: Fall/Injury  Goal: Not fall by end of shift  Outcome: Progressing     The patient's goals for the shift include comfort/safety    The clinical goals for the shift include Patient will remain safe throughout shift

## 2024-11-20 NOTE — PROGRESS NOTES
Inpatient Speech Language Pathology Treatment Note     Patient Name: Carolina Chowdhury  MRN: [id number]  : 1985  Patient Room: [address] [place], [place] [address]A  Today's Date: 24  Time Calculation  Start Time: 1347  Stop Time: 1401  Time Calculation (min): 14 min         PLAN:  Skilled speech therapy for dysphagia treatment continues to be warranted to assess tolerance of diet   SLP TX Plan: Continue Plan of Care  SLP Frequency: Follow-up visit only  Discussed POC: Patient  Discussed Risks/Benefits: Patient  Patient/Caregiver Agreeable: Yes  Continue skilled SLP at next level of care: Yes      Recommendations:    Solid Diet Recommendations: Pureed (IDDSI Level 4)   Regular Diet Recommendations: Thin (IDDSI Level 0)  Compensatory strategies: small bites/sips, upright 90 degrees for intake   Medication administration: crushed with puree or PEG tube    SLP Assessment:  Pt seen for dysphagia therapy. Pt is non-speaking at baseline, but able to indicate wants/needs with gestures and pointing. Pt tolerated trials of thin liquids via cup. Pt continues to throws head back and uses lingual pumping to clear oral cavity. Secretions remain present in trach. Mild concern for infection as pt places suction in mouth and trach. Pt currently successfully tolerating baseline diet with no evidence of difficulty with esophageal clearance. Concern for fistula not high at this time. If concern present, would require MBSS or esophagram to assess. Recommend 1x follow up to assess diet tolerance.     Subjective:  Pt. Seen at bedside for skilled dysphagia treatment.   Prior to Session Communication: Bedside nurse  Pain:  Ratin-10   Pt report: no pain reported  Action taken: none needed         Oxygen Status:   Trach collar             Goals:   Established on- 24  Pt will demonstrate tolerance of thin liquids via cup with no overt s/s aspiration/penetration in order to facilitate pt safety with PO intake and pt return to PLOF.         Treatment  Outcome:  SLP TX Intervention Outcome: Making Progress Towards Goals    Treatment Tolerance: Patient tolerated treatment well   Prognosis: Good   Barriers: Cognition   Medical Staff Made Aware: Yes         SLP Inpatient Education:  Learner patient   Barriers to Learning Cognitive limitations   Method Verbal   Education - Topic ST provided patient education regarding role of ST, purpose of assessment, clinical impressions, and goals of care.    Outcome    1= partially meets; needs review

## 2024-11-21 ENCOUNTER — APPOINTMENT (OUTPATIENT)
Dept: CARDIOLOGY | Facility: HOSPITAL | Age: 39
End: 2024-11-21
Payer: MEDICAID

## 2024-11-21 PROCEDURE — 94640 AIRWAY INHALATION TREATMENT: CPT

## 2024-11-21 PROCEDURE — 1210000001 HC SEMI-PRIVATE ROOM DAILY

## 2024-11-21 PROCEDURE — 2500000004 HC RX 250 GENERAL PHARMACY W/ HCPCS (ALT 636 FOR OP/ED): Performed by: INTERNAL MEDICINE

## 2024-11-21 PROCEDURE — 99232 SBSQ HOSP IP/OBS MODERATE 35: CPT | Performed by: HOSPITALIST

## 2024-11-21 PROCEDURE — 2500000001 HC RX 250 WO HCPCS SELF ADMINISTERED DRUGS (ALT 637 FOR MEDICARE OP): Performed by: HOSPITALIST

## 2024-11-21 PROCEDURE — 2500000001 HC RX 250 WO HCPCS SELF ADMINISTERED DRUGS (ALT 637 FOR MEDICARE OP): Performed by: STUDENT IN AN ORGANIZED HEALTH CARE EDUCATION/TRAINING PROGRAM

## 2024-11-21 PROCEDURE — 2500000001 HC RX 250 WO HCPCS SELF ADMINISTERED DRUGS (ALT 637 FOR MEDICARE OP): Performed by: INTERNAL MEDICINE

## 2024-11-21 PROCEDURE — 93005 ELECTROCARDIOGRAM TRACING: CPT

## 2024-11-21 PROCEDURE — 2500000002 HC RX 250 W HCPCS SELF ADMINISTERED DRUGS (ALT 637 FOR MEDICARE OP, ALT 636 FOR OP/ED): Performed by: HOSPITALIST

## 2024-11-21 PROCEDURE — 2500000005 HC RX 250 GENERAL PHARMACY W/O HCPCS: Performed by: HOSPITALIST

## 2024-11-21 ASSESSMENT — COGNITIVE AND FUNCTIONAL STATUS - GENERAL
DAILY ACTIVITIY SCORE: 13
TURNING FROM BACK TO SIDE WHILE IN FLAT BAD: A LITTLE
WALKING IN HOSPITAL ROOM: TOTAL
HELP NEEDED FOR BATHING: A LOT
DRESSING REGULAR LOWER BODY CLOTHING: A LOT
EATING MEALS: A LITTLE
MOBILITY SCORE: 13
MOVING TO AND FROM BED TO CHAIR: A LOT
TOILETING: A LOT
CLIMB 3 TO 5 STEPS WITH RAILING: TOTAL
PERSONAL GROOMING: A LOT
STANDING UP FROM CHAIR USING ARMS: A LOT
DRESSING REGULAR UPPER BODY CLOTHING: A LOT

## 2024-11-21 ASSESSMENT — PAIN SCALES - WONG BAKER: WONGBAKER_NUMERICALRESPONSE: NO HURT

## 2024-11-21 ASSESSMENT — PAIN SCALES - GENERAL
PAINLEVEL_OUTOF10: 0 - NO PAIN
PAINLEVEL_OUTOF10: 0 - NO PAIN

## 2024-11-21 NOTE — PROGRESS NOTES
11/21/24 0925   Discharge Planning   Type of Post Acute Facility Services Skilled nursing;Long term care   Expected Discharge Disposition Inter  ([facility])   Does the patient need discharge transport arranged? Yes   RoundTrip coordination needed? Yes   Has discharge transport been arranged? No   Patient Choice   Provider Choice list and CMS website (https://medicare.gov/care-compare#search) for post-acute Quality and Resource Measure Data were provided and reviewed with: Family     Plan was for [facility] and transition to LTC. SW coordinating with facility regarding care needs. Hospital RT called and spoke with facility RT directly to clarify pt respiratory status and needs. SW notified a short time later by [facility] facility that they are unable to accommodate pt as they were informed pt requires suctioning more than just PRN/once per shift.     Pt was previously accepted by [facility] for LTC. SW sent message to facility letting them know pt is still in need of placement. Facility confirms they are able to accommodate and pt will need level 2 PASRR results to admit. ROCKY called pt brother Afshin, unable to reach him, VM left providing update and change in facilities, SW requested a return call to further discuss. SW completed PASRR and submitted for level 2 review. Awaiting PASRR results. Pt unable to discharge until results are received.

## 2024-11-21 NOTE — CARE PLAN
The patient's goals for the shift include comfort/safety    The clinical goals for the shift include comfort/safety    Problem: Skin  Goal: Decreased wound size/increased tissue granulation at next dressing change  Outcome: Progressing  Goal: Participates in plan/prevention/treatment measures  Outcome: Progressing  Goal: Prevent/manage excess moisture  Outcome: Progressing  Goal: Prevent/minimize sheer/friction injuries  Outcome: Progressing  Goal: Promote/optimize nutrition  Outcome: Progressing  Goal: Promote skin healing  Outcome: Progressing

## 2024-11-21 NOTE — PROGRESS NOTES
"Carolina Chowdhury is a 39 y.o. female on day 3 of admission presenting with increased secretions from group home      Subjective     Seen and examined   Alert and awake   follow commands    No seizure    No seizure since then   No fever     Objective     Last Recorded Vitals  /74 (BP Location: Right arm, Patient Position: Lying)   Pulse 81   Temp 36.8 °C (98.2 °F) (Temporal)   Resp 16   Ht 1.575 m (5' 2\")   Wt 49 kg (108 lb 0.4 oz)   SpO2 94%   BMI 19.76 kg/m²      Intake/Output last 3 Shifts:    Intake/Output Summary (Last 24 hours) at 11/21/2024 1137  Last data filed at 11/20/2024 2100  Gross per 24 hour   Intake 150 ml   Output 200 ml   Net -50 ml         Scheduled medications  budesonide, 0.5 mg, nebulization, BID  enoxaparin, 40 mg, subcutaneous, q24h  famotidine, 20 mg, oral, BID  FLUoxetine, 40 mg, oral, BID  glycopyrrolate, 2 mg, oral, TID  guaiFENesin, 200 mg, oral, BID  hydrOXYzine HCL, 50 mg, oral, BID  ipratropium-albuteroL, 3 mL, nebulization, TID  levETIRAcetam, 500 mg, g-tube, BID  lubricating eye drops, 2 drop, Both Eyes, BID  metoclopramide, 10 mg, oral, TID  metoprolol succinate XL, 12.5 mg, oral, Daily  montelukast, 10 mg, oral, Nightly  OLANZapine, 2.5 mg, oral, BID  oxygen, , inhalation, Continuous - Inhalation  pantoprazole, 40 mg, oral, BID AC  polyethylene glycol, 17 g, oral, Daily  prazosin, 2 mg, oral, Nightly  traZODone, 100 mg, oral, Nightly      Continuous medications       PRN medications  PRN medications: acetaminophen **OR** acetaminophen    Physical Exam   Gen: NAD  Neck: Chronic trach  HEENT: EOM, MMM  CV: RRR, no murmurs rubs or gallops  Resp: scattered rhonchi b/l, trach in place  Abdomen: soft, NT,+BS, peg in place   LE: No edema  Neuro: Alert and awake.  Follow commands no focal deficit  Skin exam no lesions    Relevant Results  Lab Results   Component Value Date    WBC 7.4 11/19/2024    HGB 13.1 11/19/2024    HCT 40.1 11/19/2024    MCV 96 11/19/2024     " 11/19/2024     Lab Results   Component Value Date    GLUCOSE 69 (L) 11/19/2024    CALCIUM 8.4 (L) 11/19/2024     11/19/2024    K 3.9 11/19/2024    CO2 26 11/19/2024     (H) 11/19/2024    BUN 8 11/19/2024    CREATININE 0.62 11/19/2024         Assessment/Plan     Follow commands  39 year old female with history of MRDD admitted from group home for increased secretions     Chronic respiratory failure s/p post tracheostomy  Trach care  No fever      Seizure disorder  No more seizures  Seizure precaution   Keppra p.o. twice daily 500 mg     S/p PEG tube on TF:   continue tube feeds night 240ml osmolite (dont have order for nutren here) with free water flush after feed  -also on pureed diet thin liquid at group home during the day   she passed swallow eval    Intellectually challenging    Anxiety continue with the [facility]    Social work following for placement    DVT ppx: lovenox    DC planning: [facility]     Floyd Decker MD

## 2024-11-21 NOTE — PROGRESS NOTES
"Nutrition Follow Up Assessment:   Nutrition Assessment         Patient is a 39 y.o. female presenting with tracheostomy care       Nutrition History:  Energy Intake: Good > 75 %  Food and Nutrient History: RDN follow up. Met with pt who reports she is eating well, denies GI symptoms. Pt continues to receive bolus of Osmolite in the evening. Will continue to monitor.  Food Allergies/Intolerances:   eggs, apple, oat, milk, strawberries, orange juice. Question accuracy of oat, milk, and apple allergies as a RD has asked at a previous admission and pt denied being allergic to these. Appears to have tolerated Osmolite 1.5 this admission. Of note, apparently receives Nutren outpatient, which would also contain milk.   GI Symptoms: None  Oral Problems: Swallowing difficulty       Anthropometrics:  Height: 157.5 cm (5' 2\")   Weight: 49 kg (108 lb 0.4 oz)   BMI (Calculated): 19.75  IBW/kg (Dietitian Calculated): 50 kg          Weight History:   Wt Readings from Last 10 Encounters:   11/15/24 49 kg (108 lb 0.4 oz)   06/26/24 49.9 kg (110 lb)   06/05/24 49.9 kg (110 lb)   06/03/24 (!) 37 kg (81 lb 9.1 oz)   04/27/24 (!) 36.3 kg (80 lb)   03/15/24 (!) 40 kg (88 lb 2.9 oz)   10/22/21 36.1 kg (79 lb 9.6 oz)   08/27/21 37.6 kg (83 lb)   07/31/21 (!) 43 kg (94 lb 12.8 oz)      Weight Change %:  Weight History / % Weight Change: no new wts at this time    Nutrition Focused Physical Exam Findings:  Pt with hx CP, appears to be well nourished for disease state  Subcutaneous Fat Loss:      Muscle Wasting:     Edema:  Edema: none  Physical Findings:  Skin: Negative    Nutrition Significant Labs:  BMP Trend:   Results from last 7 days   Lab Units 11/19/24  0534 11/18/24  1354 11/18/24  1131 11/15/24  1610   GLUCOSE mg/dL 69* 103* 112* 84   CALCIUM mg/dL 8.4* 8.2* 9.3 9.2   SODIUM mmol/L 139 136 139 138   POTASSIUM mmol/L 3.9 3.8 3.6 3.8   CO2 mmol/L 26 25 9* 28   CHLORIDE mmol/L 108* 107 104 103   BUN mg/dL 8 10 10 12   CREATININE mg/dL " "0.62 0.65 0.79 0.59    , A1C:No results found for: \"HGBA1C\", BG POCT trend:   Results from last 7 days   Lab Units 11/18/24  1128   POCT GLUCOSE mg/dL 133*        Nutrition Specific Medications:  Scheduled medications  guaiFENesin, 200 mg, oral, BID  pantoprazole, 40 mg, oral, BID AC  polyethylene glycol, 17 g, oral, Daily    I/O:   Last BM Date: 11/21/24; Stool Appearance: Loose, Liquid (11/20/24 1000)    Dietary Orders (From admission, onward)       Start     Ordered    11/19/24 1946  Enteral feeding WITH diet order Diet type: Regular; Texture: Pureed 4; Fluid consistency: Thin 0; Tube feeding formula: Osmolite 1.5; 240; nightly  Continuous        Question Answer Comment   Diet type Regular    Texture Pureed 4    Fluid consistency Thin 0    Tube feeding formula: Osmolite 1.5    Tube feeding bolus (mL): 240    Tube feeding bolus frequency: nightly        11/19/24 1945 11/17/24 0827  May Not Participate in Room Service  ( ROOM SERVICE MAY NOT PARTICIPATE)  Once        Question:  .  Answer:  Yes    11/17/24 0826                     Estimated Needs:   Total Energy Estimated Needs (kCal): 1470 kCal  Method for Estimating Needs: 30 kcal/kg  Total Protein Estimated Needs (g): 59 g  Method for Estimating Needs: 1.2 g/kg  Total Fluid Estimated Needs (mL): 1470 mL  Method for Estimating Needs: 1 ml/kcal or per MD        Nutrition Diagnosis   Malnutrition Diagnosis  Patient has Malnutrition Diagnosis: No    Nutrition Diagnosis  Patient has Nutrition Diagnosis: Yes  Diagnosis Status (1): Ongoing  Nutrition Diagnosis 1: Swallowing difficulity  Related to (1): h/o MRDD and CP s/p trach and PEG  As Evidenced by (1): SLP recommending pureed/thin liquids, need for supplemental TF       Nutrition Interventions/Recommendations         Nutrition Prescription:  Individualized Nutrition Prescription Provided for : Continue current diet regimen        Nutrition Interventions:   Interventions: Meals and snacks, Enteral intake  Goal: " consumes 3 meals per day  Enteral Intake: Modify concentration of enteral nutrition, Modify rate of enteral nutrition  Goal: 240 m bolus feed of Osmolite 1.5 provides 360 kcal, 15 g protein, 183 ml free water. 50 ml water flush before and after each bolus         Nutrition Education:   No needs at this time       Nutrition Monitoring and Evaluation   Food/Nutrient Related History Monitoring  Monitoring and Evaluation Plan: Energy intake, Amount of food, Enteral and parenteral nutrition intake  Energy Intake: Estimated energy intake  Criteria: Pt meets >75% of estimated energy needs - met  Fluid Intake: Estimated fluid intake  Criteria: Meets >75% of estimated fluid needs - met  Amount of Food: Estimated amout of food, Medical food intake  Criteria: Pt consumes >75% of meals and supplements - met  Enteral and Parenteral Nutrition Intake: Enteral nutrition intake  Criteria: tolerate TF boluses - met    Body Composition/Growth/Weight History  Monitoring and Evaluation Plan: Weight  Weight: Measured weight  Criteria: Maintains stable weight - unable to assess    Biochemical Data, Medical Tests and Procedures  Monitoring and Evaluation Plan: Glucose/endocrine profile, Electrolyte/renal panel  Electrolyte and Renal Panel: Sodium, Potassium, Phosphorus  Criteria: Electrolytes WNL - met  Glucose/Endocrine Profile: Glucose, casual  Criteria: BG within desirable range - met              Time Spent (min): 30 minutes

## 2024-11-21 NOTE — PROGRESS NOTES
11/21/24 1603   Discharge Planning   Type of Post Acute Facility Services Long term care   Expected Discharge Disposition Inter  ([facility])   Does the patient need discharge transport arranged? Yes   RoundTrip coordination needed? Yes   Has discharge transport been arranged? No   Patient Choice   Provider Choice list and CMS website (https://medicare.gov/care-compare#search) for post-acute Quality and Resource Measure Data were provided and reviewed with: Family     SW called brother Afshin again, was able to reach him. Provided update on discharge planning. Afshin in agreement with plan for [facility] [place]. Still waiting on results from Level 2 PASRR review which is needed before pt can discharge from the hospital.

## 2024-11-22 PROCEDURE — 2500000004 HC RX 250 GENERAL PHARMACY W/ HCPCS (ALT 636 FOR OP/ED): Performed by: INTERNAL MEDICINE

## 2024-11-22 PROCEDURE — 94640 AIRWAY INHALATION TREATMENT: CPT

## 2024-11-22 PROCEDURE — 2500000001 HC RX 250 WO HCPCS SELF ADMINISTERED DRUGS (ALT 637 FOR MEDICARE OP): Performed by: STUDENT IN AN ORGANIZED HEALTH CARE EDUCATION/TRAINING PROGRAM

## 2024-11-22 PROCEDURE — 99239 HOSP IP/OBS DSCHRG MGMT >30: CPT | Performed by: HOSPITALIST

## 2024-11-22 PROCEDURE — 2500000001 HC RX 250 WO HCPCS SELF ADMINISTERED DRUGS (ALT 637 FOR MEDICARE OP): Performed by: INTERNAL MEDICINE

## 2024-11-22 PROCEDURE — 2500000002 HC RX 250 W HCPCS SELF ADMINISTERED DRUGS (ALT 637 FOR MEDICARE OP, ALT 636 FOR OP/ED): Performed by: HOSPITALIST

## 2024-11-22 PROCEDURE — 2500000005 HC RX 250 GENERAL PHARMACY W/O HCPCS: Performed by: HOSPITALIST

## 2024-11-22 PROCEDURE — 2500000001 HC RX 250 WO HCPCS SELF ADMINISTERED DRUGS (ALT 637 FOR MEDICARE OP): Performed by: HOSPITALIST

## 2024-11-22 PROCEDURE — 1210000001 HC SEMI-PRIVATE ROOM DAILY

## 2024-11-22 ASSESSMENT — PAIN SCALES - GENERAL
PAINLEVEL_OUTOF10: 0 - NO PAIN
PAINLEVEL_OUTOF10: 0 - NO PAIN

## 2024-11-22 ASSESSMENT — PAIN SCALES - WONG BAKER
WONGBAKER_NUMERICALRESPONSE: NO HURT
WONGBAKER_NUMERICALRESPONSE: NO HURT

## 2024-11-22 NOTE — CARE PLAN
The patient's goals for the shift include comfort/safety    The clinical goals for the shift include maintain safety

## 2024-11-23 PROCEDURE — 1210000001 HC SEMI-PRIVATE ROOM DAILY

## 2024-11-23 PROCEDURE — 2500000005 HC RX 250 GENERAL PHARMACY W/O HCPCS: Performed by: HOSPITALIST

## 2024-11-23 PROCEDURE — 94640 AIRWAY INHALATION TREATMENT: CPT

## 2024-11-23 PROCEDURE — 99232 SBSQ HOSP IP/OBS MODERATE 35: CPT | Performed by: HOSPITALIST

## 2024-11-23 PROCEDURE — 2500000004 HC RX 250 GENERAL PHARMACY W/ HCPCS (ALT 636 FOR OP/ED): Performed by: INTERNAL MEDICINE

## 2024-11-23 PROCEDURE — 2500000002 HC RX 250 W HCPCS SELF ADMINISTERED DRUGS (ALT 637 FOR MEDICARE OP, ALT 636 FOR OP/ED): Performed by: HOSPITALIST

## 2024-11-23 PROCEDURE — 2500000001 HC RX 250 WO HCPCS SELF ADMINISTERED DRUGS (ALT 637 FOR MEDICARE OP): Performed by: INTERNAL MEDICINE

## 2024-11-23 PROCEDURE — 2500000001 HC RX 250 WO HCPCS SELF ADMINISTERED DRUGS (ALT 637 FOR MEDICARE OP): Performed by: STUDENT IN AN ORGANIZED HEALTH CARE EDUCATION/TRAINING PROGRAM

## 2024-11-23 PROCEDURE — 2500000001 HC RX 250 WO HCPCS SELF ADMINISTERED DRUGS (ALT 637 FOR MEDICARE OP): Performed by: HOSPITALIST

## 2024-11-23 ASSESSMENT — COGNITIVE AND FUNCTIONAL STATUS - GENERAL
DRESSING REGULAR LOWER BODY CLOTHING: A LOT
TURNING FROM BACK TO SIDE WHILE IN FLAT BAD: A LITTLE
EATING MEALS: A LITTLE
DAILY ACTIVITIY SCORE: 13
TURNING FROM BACK TO SIDE WHILE IN FLAT BAD: A LITTLE
MOVING TO AND FROM BED TO CHAIR: A LOT
EATING MEALS: A LITTLE
WALKING IN HOSPITAL ROOM: TOTAL
STANDING UP FROM CHAIR USING ARMS: A LOT
DRESSING REGULAR LOWER BODY CLOTHING: A LOT
PERSONAL GROOMING: A LOT
WALKING IN HOSPITAL ROOM: TOTAL
DAILY ACTIVITIY SCORE: 13
DRESSING REGULAR UPPER BODY CLOTHING: A LOT
HELP NEEDED FOR BATHING: A LOT
TOILETING: A LOT
MOBILITY SCORE: 13
CLIMB 3 TO 5 STEPS WITH RAILING: TOTAL
MOBILITY SCORE: 13
DRESSING REGULAR UPPER BODY CLOTHING: A LOT
TOILETING: A LOT
STANDING UP FROM CHAIR USING ARMS: A LOT
PERSONAL GROOMING: A LOT
CLIMB 3 TO 5 STEPS WITH RAILING: TOTAL
HELP NEEDED FOR BATHING: A LOT
MOVING TO AND FROM BED TO CHAIR: A LOT

## 2024-11-23 ASSESSMENT — PAIN SCALES - GENERAL: PAINLEVEL_OUTOF10: 0 - NO PAIN

## 2024-11-23 ASSESSMENT — PAIN - FUNCTIONAL ASSESSMENT: PAIN_FUNCTIONAL_ASSESSMENT: WONG-BAKER FACES

## 2024-11-23 NOTE — PROGRESS NOTES
"Carolina Chowdhury is a 39 y.o. female on day 5 of admission presenting with increased secretions from group home      Subjective     Seen and examined   Alert and awake   follow commands    No seizure    No seizure since then   No fever     Objective     Last Recorded Vitals  /65   Pulse 78   Temp 36.6 °C (97.9 °F)   Resp 17   Ht 1.575 m (5' 2\")   Wt 49 kg (108 lb 0.4 oz)   SpO2 92%   BMI 19.76 kg/m²      Intake/Output last 3 Shifts:    Intake/Output Summary (Last 24 hours) at 11/23/2024 0949  Last data filed at 11/22/2024 2331  Gross per 24 hour   Intake 390 ml   Output --   Net 390 ml         Scheduled medications  budesonide, 0.5 mg, nebulization, BID  enoxaparin, 40 mg, subcutaneous, q24h  famotidine, 20 mg, oral, BID  FLUoxetine, 40 mg, oral, BID  glycopyrrolate, 2 mg, oral, TID  guaiFENesin, 200 mg, oral, BID  hydrOXYzine HCL, 50 mg, oral, BID  ipratropium-albuteroL, 3 mL, nebulization, TID  levETIRAcetam, 500 mg, g-tube, BID  lubricating eye drops, 2 drop, Both Eyes, BID  metoclopramide, 10 mg, oral, TID  metoprolol succinate XL, 12.5 mg, oral, Daily  montelukast, 10 mg, oral, Nightly  OLANZapine, 2.5 mg, oral, BID  oxygen, , inhalation, Continuous - Inhalation  pantoprazole, 40 mg, oral, BID AC  polyethylene glycol, 17 g, oral, Daily  prazosin, 2 mg, oral, Nightly  traZODone, 100 mg, oral, Nightly      Continuous medications       PRN medications  PRN medications: acetaminophen **OR** acetaminophen    Physical Exam   Gen: NAD  Neck: Chronic trach  HEENT: EOM, MMM  CV: RRR, no murmurs rubs or gallops  Resp: scattered rhonchi b/l, trach in place  Abdomen: soft, NT,+BS, peg in place   LE: No edema  Neuro: Alert and awake.  Follow commands no focal deficit  Skin exam no lesions    Relevant Results  Lab Results   Component Value Date    WBC 7.4 11/19/2024    HGB 13.1 11/19/2024    HCT 40.1 11/19/2024    MCV 96 11/19/2024     11/19/2024     Lab Results   Component Value Date    GLUCOSE 69 (L) " 11/19/2024    CALCIUM 8.4 (L) 11/19/2024     11/19/2024    K 3.9 11/19/2024    CO2 26 11/19/2024     (H) 11/19/2024    BUN 8 11/19/2024    CREATININE 0.62 11/19/2024         Assessment/Plan     Follow commands  39 year old female with history of MRDD admitted from group home for increased secretions     Chronic respiratory failure s/p post tracheostomy  Seen and examined   Clinically stable     Trach care  No fever      Seizure disorder  No more seizures  Seizure precaution   Keppra p.o. twice daily 500 mg     S/p PEG tube on TF:   continue tube feeds night 240ml osmolite (dont have order for nutren here) with free water flush after feed  -also on pureed diet thin liquid at group home during the day   she passed swallow eval    Intellectually challenging    Anxiety continue with the [facility]    Social work following for placement    DVT ppx: lovenox    DC planning: Waiting for SNF placement

## 2024-11-23 NOTE — CARE PLAN
The patient's goals for the shift include comfort/safety    The clinical goals for the shift include pt will remain HDS this shift      Problem: Fall/Injury  Goal: Not fall by end of shift  Outcome: Met

## 2024-11-24 VITALS
SYSTOLIC BLOOD PRESSURE: 123 MMHG | HEIGHT: 62 IN | OXYGEN SATURATION: 98 % | WEIGHT: 108.03 LBS | TEMPERATURE: 98.1 F | RESPIRATION RATE: 16 BRPM | HEART RATE: 72 BPM | DIASTOLIC BLOOD PRESSURE: 73 MMHG | BODY MASS INDEX: 19.88 KG/M2

## 2024-11-24 LAB
ATRIAL RATE: 108 BPM
P AXIS: 72 DEGREES
P OFFSET: 203 MS
P ONSET: 143 MS
PR INTERVAL: 160 MS
Q ONSET: 223 MS
QRS COUNT: 18 BEATS
QRS DURATION: 82 MS
QT INTERVAL: 382 MS
QTC CALCULATION(BAZETT): 511 MS
QTC FREDERICIA: 464 MS
R AXIS: 66 DEGREES
T AXIS: 66 DEGREES
T OFFSET: 414 MS
VENTRICULAR RATE: 108 BPM

## 2024-11-24 PROCEDURE — 2500000002 HC RX 250 W HCPCS SELF ADMINISTERED DRUGS (ALT 637 FOR MEDICARE OP, ALT 636 FOR OP/ED): Performed by: HOSPITALIST

## 2024-11-24 PROCEDURE — 2500000001 HC RX 250 WO HCPCS SELF ADMINISTERED DRUGS (ALT 637 FOR MEDICARE OP): Performed by: STUDENT IN AN ORGANIZED HEALTH CARE EDUCATION/TRAINING PROGRAM

## 2024-11-24 PROCEDURE — 2500000001 HC RX 250 WO HCPCS SELF ADMINISTERED DRUGS (ALT 637 FOR MEDICARE OP): Performed by: HOSPITALIST

## 2024-11-24 PROCEDURE — 1210000001 HC SEMI-PRIVATE ROOM DAILY

## 2024-11-24 PROCEDURE — 2500000004 HC RX 250 GENERAL PHARMACY W/ HCPCS (ALT 636 FOR OP/ED): Performed by: INTERNAL MEDICINE

## 2024-11-24 PROCEDURE — 2500000005 HC RX 250 GENERAL PHARMACY W/O HCPCS: Performed by: HOSPITALIST

## 2024-11-24 PROCEDURE — 94640 AIRWAY INHALATION TREATMENT: CPT

## 2024-11-24 PROCEDURE — 2500000001 HC RX 250 WO HCPCS SELF ADMINISTERED DRUGS (ALT 637 FOR MEDICARE OP): Performed by: INTERNAL MEDICINE

## 2024-11-24 PROCEDURE — 99232 SBSQ HOSP IP/OBS MODERATE 35: CPT | Performed by: HOSPITALIST

## 2024-11-24 ASSESSMENT — COGNITIVE AND FUNCTIONAL STATUS - GENERAL
WALKING IN HOSPITAL ROOM: TOTAL
MOVING TO AND FROM BED TO CHAIR: A LOT
MOBILITY SCORE: 13
CLIMB 3 TO 5 STEPS WITH RAILING: TOTAL
MOVING TO AND FROM BED TO CHAIR: A LOT
DRESSING REGULAR LOWER BODY CLOTHING: A LOT
EATING MEALS: A LITTLE
HELP NEEDED FOR BATHING: A LOT
MOBILITY SCORE: 13
CLIMB 3 TO 5 STEPS WITH RAILING: TOTAL
WALKING IN HOSPITAL ROOM: TOTAL
PERSONAL GROOMING: A LOT
DAILY ACTIVITIY SCORE: 13
TURNING FROM BACK TO SIDE WHILE IN FLAT BAD: A LITTLE
DRESSING REGULAR UPPER BODY CLOTHING: A LOT
HELP NEEDED FOR BATHING: A LOT
STANDING UP FROM CHAIR USING ARMS: A LOT
TOILETING: A LOT
STANDING UP FROM CHAIR USING ARMS: A LOT
EATING MEALS: A LITTLE
DAILY ACTIVITIY SCORE: 13
DRESSING REGULAR LOWER BODY CLOTHING: A LOT
TOILETING: A LOT
PERSONAL GROOMING: A LOT
TURNING FROM BACK TO SIDE WHILE IN FLAT BAD: A LITTLE
DRESSING REGULAR UPPER BODY CLOTHING: A LOT

## 2024-11-24 ASSESSMENT — PAIN SCALES - GENERAL
PAINLEVEL_OUTOF10: 0 - NO PAIN
PAINLEVEL_OUTOF10: 0 - NO PAIN

## 2024-11-24 ASSESSMENT — PAIN - FUNCTIONAL ASSESSMENT: PAIN_FUNCTIONAL_ASSESSMENT: WONG-BAKER FACES

## 2024-11-24 ASSESSMENT — PAIN SCALES - WONG BAKER: WONGBAKER_NUMERICALRESPONSE: NO HURT

## 2024-11-24 NOTE — PROGRESS NOTES
"Carolina Chowdhury is a 39 y.o. female on day 6 of admission presenting with increased secretions from group home      Subjective     Seen and examined   Alert and awake   follow commands    No seizure        Objective     Last Recorded Vitals  /84   Pulse 76   Temp 36.7 °C (98.1 °F)   Resp 16   Ht 1.575 m (5' 2\")   Wt 49 kg (108 lb 0.4 oz)   SpO2 96%   BMI 19.76 kg/m²      Intake/Output last 3 Shifts:    Intake/Output Summary (Last 24 hours) at 11/24/2024 1050  Last data filed at 11/24/2024 0700  Gross per 24 hour   Intake 540 ml   Output --   Net 540 ml         Scheduled medications  budesonide, 0.5 mg, nebulization, BID  enoxaparin, 40 mg, subcutaneous, q24h  famotidine, 20 mg, oral, BID  FLUoxetine, 40 mg, oral, BID  glycopyrrolate, 2 mg, oral, TID  guaiFENesin, 200 mg, oral, BID  hydrOXYzine HCL, 50 mg, oral, BID  ipratropium-albuteroL, 3 mL, nebulization, TID  levETIRAcetam, 500 mg, g-tube, BID  lubricating eye drops, 2 drop, Both Eyes, BID  metoclopramide, 10 mg, oral, TID  metoprolol succinate XL, 12.5 mg, oral, Daily  montelukast, 10 mg, oral, Nightly  OLANZapine, 2.5 mg, oral, BID  oxygen, , inhalation, Continuous - Inhalation  pantoprazole, 40 mg, oral, BID AC  polyethylene glycol, 17 g, oral, Daily  prazosin, 2 mg, oral, Nightly  traZODone, 100 mg, oral, Nightly      Continuous medications       PRN medications  PRN medications: acetaminophen **OR** acetaminophen    Physical Exam   Gen: NAD  Neck: Chronic trach  HEENT: EOM, MMM  CV: RRR, no murmurs rubs or gallops  Resp: scattered rhonchi b/l, trach in place  Abdomen: soft, NT,+BS, peg in place   LE: No edema  Neuro: Alert and awake.  Follow commands no focal deficit  Skin exam no lesions    Relevant Results  Lab Results   Component Value Date    WBC 7.4 11/19/2024    HGB 13.1 11/19/2024    HCT 40.1 11/19/2024    MCV 96 11/19/2024     11/19/2024     Lab Results   Component Value Date    GLUCOSE 69 (L) 11/19/2024    CALCIUM 8.4 (L) " 11/19/2024     11/19/2024    K 3.9 11/19/2024    CO2 26 11/19/2024     (H) 11/19/2024    BUN 8 11/19/2024    CREATININE 0.62 11/19/2024         Assessment/Plan     Follow commands  39 year old female with history of MRDD admitted from group home for increased secretions     Chronic respiratory failure s/p post tracheostomy  Seen and examined   Clinically stable     Alert and awake   Follows commands   Trach care  No fever      Seizure disorder  No more seizures  Seizure precaution   Keppra p.o. twice daily 500 mg     S/p PEG tube on TF:   continue tube feeds night 240ml osmolite (dont have order for nutren here) with free water flush after feed  -also on pureed diet thin liquid at group home during the day   she passed swallow eval    Intellectually challenging    Anxiety continue with the [facility]    Social work following for placement    DVT ppx: lovenox    DC planning: Waiting for SNF placement        Floyd Decker MD

## 2024-11-24 NOTE — CARE PLAN
The patient's goals for the shift include comfort/safety    The clinical goals for the shift include pt will remain free from injury

## 2024-11-24 NOTE — CARE PLAN
The patient's goals for the shift include DWAYNE    The clinical goals for the shift include pt will remain free from injury      Problem: Skin  Goal: Decreased wound size/increased tissue granulation at next dressing change  Outcome: Progressing  Flowsheets (Taken 11/22/2024 1120 by Siria Ni, RN)  Decreased wound size/increased tissue granulation at next dressing change: Promote sleep for wound healing  Goal: Participates in plan/prevention/treatment measures  Outcome: Progressing  Flowsheets (Taken 11/16/2024 1544 by Camila Rosen RN)  Participates in plan/prevention/treatment measures:   Increase activity/out of bed for meals   Discuss with provider PT/OT consult   Elevate heels  Goal: Prevent/manage excess moisture  Outcome: Progressing  Flowsheets (Taken 11/22/2024 1120 by Siria Ni, RN)  Prevent/manage excess moisture: Moisturize dry skin  Goal: Prevent/minimize sheer/friction injuries  Outcome: Progressing  Flowsheets (Taken 11/18/2024 0052 by Cha Marie RN)  Prevent/minimize sheer/friction injuries:   Use pull sheet   Turn/reposition every 2 hours/use positioning/transfer devices  Goal: Promote/optimize nutrition  Outcome: Progressing  Flowsheets (Taken 11/16/2024 2154 by Franklin Richards RN)  Promote/optimize nutrition: Consume > 50% meals/supplements  Goal: Promote skin healing  Outcome: Progressing     Problem: Fall/Injury  Goal: Be free from injury by end of the shift  Outcome: Progressing  Goal: Verbalize understanding of personal risk factors for fall in the hospital  Outcome: Progressing  Goal: Verbalize understanding of risk factor reduction measures to prevent injury from fall in the home  Outcome: Progressing  Goal: Use assistive devices by end of the shift  Outcome: Progressing  Goal: Pace activities to prevent fatigue by end of the shift  Outcome: Progressing     Problem: Nutrition  Goal: Oral intake greater 75%  Outcome: Progressing  Goal: Adequate PO fluid intake  Outcome:  Progressing  Goal: Nutrition support is meeting 75% of nutrient needs  Outcome: Progressing  Goal: Tube feed tolerance  Outcome: Progressing  Goal: BG  mg/dL  Outcome: Progressing  Goal: Electrolytes WNL  Outcome: Progressing  Goal: Maintain stable weight  Outcome: Progressing     Problem: Pain - Adult  Goal: Verbalizes/displays adequate comfort level or baseline comfort level  Outcome: Progressing     Problem: Safety - Adult  Goal: Free from fall injury  Outcome: Progressing     Problem: Discharge Planning  Goal: Discharge to home or other facility with appropriate resources  Outcome: Progressing     Problem: Chronic Conditions and Co-morbidities  Goal: Patient's chronic conditions and co-morbidity symptoms are monitored and maintained or improved  Outcome: Progressing

## 2024-11-25 PROCEDURE — 92526 ORAL FUNCTION THERAPY: CPT | Mod: GN

## 2024-11-25 PROCEDURE — 2500000001 HC RX 250 WO HCPCS SELF ADMINISTERED DRUGS (ALT 637 FOR MEDICARE OP): Performed by: HOSPITALIST

## 2024-11-25 PROCEDURE — 2500000005 HC RX 250 GENERAL PHARMACY W/O HCPCS: Performed by: HOSPITALIST

## 2024-11-25 PROCEDURE — 94640 AIRWAY INHALATION TREATMENT: CPT

## 2024-11-25 PROCEDURE — 1210000001 HC SEMI-PRIVATE ROOM DAILY

## 2024-11-25 PROCEDURE — 2500000002 HC RX 250 W HCPCS SELF ADMINISTERED DRUGS (ALT 637 FOR MEDICARE OP, ALT 636 FOR OP/ED): Performed by: HOSPITALIST

## 2024-11-25 PROCEDURE — 2500000001 HC RX 250 WO HCPCS SELF ADMINISTERED DRUGS (ALT 637 FOR MEDICARE OP): Performed by: STUDENT IN AN ORGANIZED HEALTH CARE EDUCATION/TRAINING PROGRAM

## 2024-11-25 PROCEDURE — 2500000001 HC RX 250 WO HCPCS SELF ADMINISTERED DRUGS (ALT 637 FOR MEDICARE OP): Performed by: INTERNAL MEDICINE

## 2024-11-25 ASSESSMENT — PAIN SCALES - GENERAL: PAINLEVEL_OUTOF10: 0 - NO PAIN

## 2024-11-25 ASSESSMENT — COGNITIVE AND FUNCTIONAL STATUS - GENERAL
DRESSING REGULAR UPPER BODY CLOTHING: A LOT
DRESSING REGULAR LOWER BODY CLOTHING: A LOT
STANDING UP FROM CHAIR USING ARMS: A LOT
TOILETING: A LOT
CLIMB 3 TO 5 STEPS WITH RAILING: TOTAL
DAILY ACTIVITIY SCORE: 12
WALKING IN HOSPITAL ROOM: TOTAL
TURNING FROM BACK TO SIDE WHILE IN FLAT BAD: A LITTLE
HELP NEEDED FOR BATHING: A LOT
DRESSING REGULAR LOWER BODY CLOTHING: A LOT
EATING MEALS: A LOT
STANDING UP FROM CHAIR USING ARMS: A LOT
WALKING IN HOSPITAL ROOM: TOTAL
DRESSING REGULAR UPPER BODY CLOTHING: A LOT
EATING MEALS: A LOT
TURNING FROM BACK TO SIDE WHILE IN FLAT BAD: A LITTLE
MOBILITY SCORE: 13
MOBILITY SCORE: 13
MOVING TO AND FROM BED TO CHAIR: A LOT
MOVING TO AND FROM BED TO CHAIR: A LOT
DAILY ACTIVITIY SCORE: 12
PERSONAL GROOMING: A LOT
PERSONAL GROOMING: A LOT
CLIMB 3 TO 5 STEPS WITH RAILING: TOTAL
HELP NEEDED FOR BATHING: A LOT
TOILETING: A LOT

## 2024-11-25 ASSESSMENT — PAIN - FUNCTIONAL ASSESSMENT
PAIN_FUNCTIONAL_ASSESSMENT: WONG-BAKER FACES
PAIN_FUNCTIONAL_ASSESSMENT: WONG-BAKER FACES

## 2024-11-25 ASSESSMENT — PAIN SCALES - WONG BAKER
WONGBAKER_NUMERICALRESPONSE: NO HURT

## 2024-11-25 NOTE — PROGRESS NOTES
11/25/24 0811   Discharge Planning   Type of Post Acute Facility Services Long term care   Expected Discharge Disposition Inter  ([facility])   Does the patient need discharge transport arranged? Yes   RoundTrip coordination needed? Yes   Has discharge transport been arranged? No     PASRR submitted for level II review on 11/20, still awaiting determination/results. Message sent to [employer] to inquire about status of review, awaiting response. Pt cannot admit to LTC facility until PASRR results are received.

## 2024-11-25 NOTE — PROGRESS NOTES
"Carolina Chowdhury is a 39 y.o. female on day 7 of admission presenting with increased secretions from group home      Subjective     Seen and examined   Alert and awake   follow commands    No seizure        Objective     Last Recorded Vitals  /83   Pulse 81   Temp 36.6 °C (97.9 °F)   Resp 16   Ht 1.575 m (5' 2\")   Wt 49 kg (108 lb 0.4 oz)   SpO2 97%   BMI 19.76 kg/m²      Intake/Output last 3 Shifts:    Intake/Output Summary (Last 24 hours) at 11/25/2024 1149  Last data filed at 11/25/2024 0611  Gross per 24 hour   Intake 1464 ml   Output --   Net 1464 ml         Scheduled medications  budesonide, 0.5 mg, nebulization, BID  enoxaparin, 40 mg, subcutaneous, q24h  famotidine, 20 mg, oral, BID  FLUoxetine, 40 mg, oral, BID  glycopyrrolate, 2 mg, oral, TID  guaiFENesin, 200 mg, oral, BID  hydrOXYzine HCL, 50 mg, oral, BID  ipratropium-albuteroL, 3 mL, nebulization, TID  levETIRAcetam, 500 mg, g-tube, BID  lubricating eye drops, 2 drop, Both Eyes, BID  metoclopramide, 10 mg, oral, TID  metoprolol succinate XL, 12.5 mg, oral, Daily  montelukast, 10 mg, oral, Nightly  OLANZapine, 2.5 mg, oral, BID  oxygen, , inhalation, Continuous - Inhalation  pantoprazole, 40 mg, oral, BID AC  polyethylene glycol, 17 g, oral, Daily  prazosin, 2 mg, oral, Nightly  traZODone, 100 mg, oral, Nightly      Continuous medications       PRN medications  PRN medications: acetaminophen **OR** acetaminophen    Physical Exam   Gen: NAD  Neck: Chronic trach  HEENT: EOM, MMM  CV: RRR, no murmurs rubs or gallops  Resp: scattered rhonchi b/l, trach in place  Abdomen: soft, NT,+BS, peg in place   LE: No edema  Neuro: Alert and awake.  Follow commands no focal deficit  Skin exam no lesions    Relevant Results  Lab Results   Component Value Date    WBC 7.4 11/19/2024    HGB 13.1 11/19/2024    HCT 40.1 11/19/2024    MCV 96 11/19/2024     11/19/2024     Lab Results   Component Value Date    GLUCOSE 69 (L) 11/19/2024    CALCIUM 8.4 (L) " 11/19/2024     11/19/2024    K 3.9 11/19/2024    CO2 26 11/19/2024     (H) 11/19/2024    BUN 8 11/19/2024    CREATININE 0.62 11/19/2024         Assessment/Plan     Follow commands  39 year old female with history of MRDD admitted from group home for increased secretions     Chronic respiratory failure s/p post tracheostomy  Seen and examined   Clinically stable   Alert and awake   Follows commands   Trach care  No fever      Seizure disorder  No more seizures  Seizure precaution   Keppra p.o. twice daily 500 mg     S/p PEG tube on TF    Intellectually challenging    Anxiety continue with the [facility]    Social work following for placement    DVT ppx: lovenox    DC planning: Waiting for SNF placement        Floyd Decker MD

## 2024-11-25 NOTE — CARE PLAN
The patient's goals for the shift include control of secretions    The clinical goals for the shift include pt will remain free from injury      Problem: Skin  Goal: Decreased wound size/increased tissue granulation at next dressing change  Outcome: Progressing  Flowsheets (Taken 11/22/2024 1120 by Siria Ni, RN)  Decreased wound size/increased tissue granulation at next dressing change: Promote sleep for wound healing  Goal: Participates in plan/prevention/treatment measures  Outcome: Progressing  Flowsheets (Taken 11/16/2024 1544 by Camila Rosen RN)  Participates in plan/prevention/treatment measures:   Increase activity/out of bed for meals   Discuss with provider PT/OT consult   Elevate heels  Goal: Prevent/manage excess moisture  Outcome: Progressing  Flowsheets (Taken 11/24/2024 2126)  Prevent/manage excess moisture: Cleanse incontinence/protect with barrier cream  Goal: Prevent/minimize sheer/friction injuries  Outcome: Progressing  Flowsheets (Taken 11/24/2024 2126)  Prevent/minimize sheer/friction injuries: Use pull sheet  Goal: Promote/optimize nutrition  Outcome: Progressing  Flowsheets (Taken 11/24/2024 2126)  Promote/optimize nutrition: Offer water/supplements/favorite foods  Goal: Promote skin healing  Outcome: Progressing  Flowsheets (Taken 11/24/2024 2126)  Promote skin healing: Protective dressings over bony prominences     Problem: Fall/Injury  Goal: Be free from injury by end of the shift  Outcome: Progressing  Goal: Verbalize understanding of personal risk factors for fall in the hospital  Outcome: Progressing  Goal: Verbalize understanding of risk factor reduction measures to prevent injury from fall in the home  Outcome: Progressing  Goal: Use assistive devices by end of the shift  Outcome: Progressing  Goal: Pace activities to prevent fatigue by end of the shift  Outcome: Progressing  Goal: Not fall by end of shift  Outcome: Progressing     Problem: Nutrition  Goal: Oral intake  greater 75%  Outcome: Progressing  Goal: Adequate PO fluid intake  Outcome: Progressing  Goal: Nutrition support is meeting 75% of nutrient needs  Outcome: Progressing  Goal: Tube feed tolerance  Outcome: Progressing  Goal: BG  mg/dL  Outcome: Progressing  Goal: Electrolytes WNL  Outcome: Progressing  Goal: Maintain stable weight  Outcome: Progressing     Problem: Pain - Adult  Goal: Verbalizes/displays adequate comfort level or baseline comfort level  Outcome: Progressing     Problem: Safety - Adult  Goal: Free from fall injury  Outcome: Progressing     Problem: Discharge Planning  Goal: Discharge to home or other facility with appropriate resources  Outcome: Progressing     Problem: Chronic Conditions and Co-morbidities  Goal: Patient's chronic conditions and co-morbidity symptoms are monitored and maintained or improved  Outcome: Progressing

## 2024-11-25 NOTE — PROGRESS NOTES
Inpatient Speech Language Pathology Treatment Note     Patient Name: Carolina Chowdhury  MRN: [id number]  : 1985  Patient Room: [address] [place], [place] [address]  Today's Date: 24  Time Calculation  Start Time: 810  Stop Time: 818  Time Calculation (min): 8 min         PLAN:  SLP TX Plan: Discharge from Speech Therapy    Discussed POC: Patient  Discussed Risks/Benefits: Patient  Patient/Caregiver Agreeable: Yes  Continue skilled SLP at next level of care: Not at this time, re-consult if clinically warranted       Recommendations:   Solid Diet Recommendations: Pureed/extremely thick  (IDDSI Level 4)  Liquid Diet Recommendations: Thin (IDDSI Level 0)  Compensatory strategies: small bites/sips, alternate bites/sips, upright 90 degrees for intake , remain upright 1 hour post intake   Medication administration: via PEG     SLP Assessment:  Pt had already eat breakfast when entering room. Continues to tolerate intake with no evidence of fistula. If concern would become present, a modifiied barium swallow study or esophagram would be warranted to further assess. Does continues to self use suction to suction oral resiude as needed per pt preference. Concern remains high for infection risk with this but patient very adamant she needs it. Pt continues to have anterior spillage of liquids due to poor labial seal around cup but pt does refuse to use spoon for bolus control. Again; Pt very adamant about her wishes.   Continue puree/thin liquid diet. No further SLP at this time.       Subjective:  Pt. Seen at bedside for skilled dysphagia treatment.   Prior to Session Communication: Bedside nurse  Pain:  [id number]-[id number]   Pt report: 0  Action taken: none needed         Oxygen Status:   Trach collar             Goals:   Established on- 24  Pt will demonstrate tolerance of thin liquids via cup with no overt s/s aspiration/penetration in order to facilitate pt safety with PO intake and pt return to PLOF. - goal met. 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          Treatment Outcome:  SLP TX Intervention Outcome: Other (Comment)    Treatment Tolerance: Patient tolerated treatment well   Prognosis: Good   Barriers: Cognition   Medical Staff Made Aware: Yes         SLP Inpatient Education:  Learner patient   Barriers to Learning Cognitive limitations   Method Verbal   Education - Topic ST provided patient education regarding role of ST, purpose of treatment clinical impressions, recommendations   Outcome    1= partially meets; unable to fully meet due to cognitive deficits at baseline

## 2024-11-26 PROCEDURE — 2500000004 HC RX 250 GENERAL PHARMACY W/ HCPCS (ALT 636 FOR OP/ED): Performed by: INTERNAL MEDICINE

## 2024-11-26 PROCEDURE — 2500000001 HC RX 250 WO HCPCS SELF ADMINISTERED DRUGS (ALT 637 FOR MEDICARE OP): Performed by: HOSPITALIST

## 2024-11-26 PROCEDURE — 94760 N-INVAS EAR/PLS OXIMETRY 1: CPT

## 2024-11-26 PROCEDURE — 2500000005 HC RX 250 GENERAL PHARMACY W/O HCPCS: Performed by: HOSPITALIST

## 2024-11-26 PROCEDURE — 2500000001 HC RX 250 WO HCPCS SELF ADMINISTERED DRUGS (ALT 637 FOR MEDICARE OP): Performed by: INTERNAL MEDICINE

## 2024-11-26 PROCEDURE — 2500000002 HC RX 250 W HCPCS SELF ADMINISTERED DRUGS (ALT 637 FOR MEDICARE OP, ALT 636 FOR OP/ED): Performed by: HOSPITALIST

## 2024-11-26 PROCEDURE — 94640 AIRWAY INHALATION TREATMENT: CPT

## 2024-11-26 PROCEDURE — 99231 SBSQ HOSP IP/OBS SF/LOW 25: CPT | Performed by: STUDENT IN AN ORGANIZED HEALTH CARE EDUCATION/TRAINING PROGRAM

## 2024-11-26 PROCEDURE — 1210000001 HC SEMI-PRIVATE ROOM DAILY

## 2024-11-26 ASSESSMENT — COGNITIVE AND FUNCTIONAL STATUS - GENERAL
DRESSING REGULAR LOWER BODY CLOTHING: A LOT
STANDING UP FROM CHAIR USING ARMS: A LOT
PERSONAL GROOMING: A LOT
TURNING FROM BACK TO SIDE WHILE IN FLAT BAD: A LITTLE
STANDING UP FROM CHAIR USING ARMS: A LOT
EATING MEALS: A LOT
DRESSING REGULAR UPPER BODY CLOTHING: TOTAL
WALKING IN HOSPITAL ROOM: TOTAL
DRESSING REGULAR LOWER BODY CLOTHING: TOTAL
TOILETING: A LOT
MOVING TO AND FROM BED TO CHAIR: A LOT
CLIMB 3 TO 5 STEPS WITH RAILING: TOTAL
MOBILITY SCORE: 13
DAILY ACTIVITIY SCORE: 6
DAILY ACTIVITIY SCORE: 12
CLIMB 3 TO 5 STEPS WITH RAILING: TOTAL
PERSONAL GROOMING: TOTAL
EATING MEALS: TOTAL
TURNING FROM BACK TO SIDE WHILE IN FLAT BAD: A LITTLE
MOVING TO AND FROM BED TO CHAIR: A LOT
TOILETING: TOTAL
MOBILITY SCORE: 13
WALKING IN HOSPITAL ROOM: TOTAL
HELP NEEDED FOR BATHING: TOTAL
HELP NEEDED FOR BATHING: A LOT
DRESSING REGULAR UPPER BODY CLOTHING: A LOT

## 2024-11-26 ASSESSMENT — PAIN SCALES - GENERAL
PAINLEVEL_OUTOF10: 0 - NO PAIN
PAINLEVEL_OUTOF10: 3
PAINLEVEL_OUTOF10: 0 - NO PAIN

## 2024-11-26 ASSESSMENT — PAIN - FUNCTIONAL ASSESSMENT
PAIN_FUNCTIONAL_ASSESSMENT: 0-10
PAIN_FUNCTIONAL_ASSESSMENT: 0-10

## 2024-11-26 ASSESSMENT — PAIN SCALES - WONG BAKER: WONGBAKER_NUMERICALRESPONSE: NO HURT

## 2024-11-26 ASSESSMENT — PAIN DESCRIPTION - DESCRIPTORS: DESCRIPTORS: DISCOMFORT

## 2024-11-26 NOTE — PROGRESS NOTES
"Carolina Chowdhury is a 39 y.o. female on day 8 of admission presenting with increased secretions from group home      Subjective     Patient had no acute events overnight.  Laying in bed.  When asked how she was doing patient gave a thumbs up.  Shook head no when asked if she was in any pain or had any difficulty breathing.  Denies any abdominal pain.  Denied any further concerns.      Objective     Last Recorded Vitals  /77   Pulse 75   Temp 36.6 °C (97.9 °F)   Resp 18   Ht 1.575 m (5' 2\")   Wt 49 kg (108 lb 0.4 oz)   SpO2 98%   BMI 19.76 kg/m²      Intake/Output last 3 Shifts:    Intake/Output Summary (Last 24 hours) at 11/26/2024 1631  Last data filed at 11/26/2024 1255  Gross per 24 hour   Intake 1102 ml   Output 550 ml   Net 552 ml         Scheduled medications  budesonide, 0.5 mg, nebulization, BID  enoxaparin, 40 mg, subcutaneous, q24h  famotidine, 20 mg, oral, BID  FLUoxetine, 40 mg, oral, BID  glycopyrrolate, 2 mg, oral, TID  guaiFENesin, 200 mg, oral, BID  hydrOXYzine HCL, 50 mg, oral, BID  ipratropium-albuteroL, 3 mL, nebulization, TID  levETIRAcetam, 500 mg, g-tube, BID  lubricating eye drops, 2 drop, Both Eyes, BID  metoclopramide, 10 mg, oral, TID  metoprolol succinate XL, 12.5 mg, oral, Daily  montelukast, 10 mg, oral, Nightly  OLANZapine, 2.5 mg, oral, BID  oxygen, , inhalation, Continuous - Inhalation  pantoprazole, 40 mg, oral, BID AC  polyethylene glycol, 17 g, oral, Daily  prazosin, 2 mg, oral, Nightly  traZODone, 100 mg, oral, Nightly      Continuous medications       PRN medications  PRN medications: acetaminophen **OR** acetaminophen    Physical Exam   General: Ill-appearing adult female in mild distress   HEENT: Clear sclera, EOMI, trachea midline, moist mucous membranes, trach in place  Extremities: No cyanosis or clubbing appreciated  Neurological: Spontaneously moves all extremities, awake, alert  Psychiatric: Appropriate mood and affect  Skin: Warm, dry    Relevant Results  Lab " Results   Component Value Date    WBC 7.4 11/19/2024    HGB 13.1 11/19/2024    HCT 40.1 11/19/2024    MCV 96 11/19/2024     11/19/2024     Lab Results   Component Value Date    GLUCOSE 69 (L) 11/19/2024    CALCIUM 8.4 (L) 11/19/2024     11/19/2024    K 3.9 11/19/2024    CO2 26 11/19/2024     (H) 11/19/2024    BUN 8 11/19/2024    CREATININE 0.62 11/19/2024         Assessment/Plan     39 year old female with history of MRDD admitted from group home for increased secretions     Chronic respiratory failure s/p post tracheostomy  Seen and examined   Clinically stable   Alert and awake   Follows commands   Trach care  No fever    Seizure disorder  No more seizures  Seizure precaution   Keppra p.o. twice daily 500 mg     S/p PEG tube on TF    Intellectually challenging    Anxiety continue with the Prozac    Social work following for placement    DVT ppx: lovenox    DC planning: Patient was discharged on 11/22.  Dispo to LTC pending state clearance.  Remains medically cleared       Christiano Sullivan MD

## 2024-11-26 NOTE — CARE PLAN
The patient's goals for the shift include comfort/safety    The clinical goals for the shift include pt will remain free from injury      Problem: Skin  Goal: Decreased wound size/increased tissue granulation at next dressing change  Outcome: Progressing  Flowsheets (Taken 11/25/2024 2226)  Decreased wound size/increased tissue granulation at next dressing change: Promote sleep for wound healing  Goal: Participates in plan/prevention/treatment measures  Outcome: Progressing  Flowsheets (Taken 11/25/2024 2226)  Participates in plan/prevention/treatment measures: Elevate heels  Goal: Prevent/manage excess moisture  Outcome: Progressing  Flowsheets (Taken 11/24/2024 2126)  Prevent/manage excess moisture: Cleanse incontinence/protect with barrier cream  Goal: Prevent/minimize sheer/friction injuries  Outcome: Progressing  Flowsheets (Taken 11/25/2024 2226)  Prevent/minimize sheer/friction injuries: Use pull sheet  Goal: Promote/optimize nutrition  Outcome: Progressing  Flowsheets (Taken 11/25/2024 2226)  Promote/optimize nutrition: Offer water/supplements/favorite foods  Goal: Promote skin healing  Outcome: Progressing  Flowsheets (Taken 11/25/2024 2226)  Promote skin healing:   Assess skin/pad under line(s)/device(s)   Protective dressings over bony prominences     Problem: Fall/Injury  Goal: Be free from injury by end of the shift  Outcome: Progressing  Goal: Verbalize understanding of personal risk factors for fall in the hospital  Outcome: Progressing  Goal: Verbalize understanding of risk factor reduction measures to prevent injury from fall in the home  Outcome: Progressing  Goal: Use assistive devices by end of the shift  Outcome: Progressing  Goal: Pace activities to prevent fatigue by end of the shift  Outcome: Progressing  Goal: Not fall by end of shift  Outcome: Progressing     Problem: Nutrition  Goal: Oral intake greater 75%  Outcome: Progressing  Goal: Adequate PO fluid intake  Outcome: Progressing  Goal:  Nutrition support is meeting 75% of nutrient needs  Outcome: Progressing  Goal: Tube feed tolerance  Outcome: Progressing  Goal: BG  mg/dL  Outcome: Progressing  Goal: Electrolytes WNL  Outcome: Progressing  Goal: Maintain stable weight  Outcome: Progressing     Problem: Pain - Adult  Goal: Verbalizes/displays adequate comfort level or baseline comfort level  Outcome: Progressing     Problem: Safety - Adult  Goal: Free from fall injury  Outcome: Progressing     Problem: Discharge Planning  Goal: Discharge to home or other facility with appropriate resources  Outcome: Progressing     Problem: Chronic Conditions and Co-morbidities  Goal: Patient's chronic conditions and co-morbidity symptoms are monitored and maintained or improved  Outcome: Progressing

## 2024-11-27 PROCEDURE — 2500000005 HC RX 250 GENERAL PHARMACY W/O HCPCS: Performed by: HOSPITALIST

## 2024-11-27 PROCEDURE — 2500000002 HC RX 250 W HCPCS SELF ADMINISTERED DRUGS (ALT 637 FOR MEDICARE OP, ALT 636 FOR OP/ED): Performed by: HOSPITALIST

## 2024-11-27 PROCEDURE — 94640 AIRWAY INHALATION TREATMENT: CPT

## 2024-11-27 PROCEDURE — 2500000001 HC RX 250 WO HCPCS SELF ADMINISTERED DRUGS (ALT 637 FOR MEDICARE OP): Performed by: INTERNAL MEDICINE

## 2024-11-27 PROCEDURE — 2500000004 HC RX 250 GENERAL PHARMACY W/ HCPCS (ALT 636 FOR OP/ED): Performed by: INTERNAL MEDICINE

## 2024-11-27 PROCEDURE — 94760 N-INVAS EAR/PLS OXIMETRY 1: CPT

## 2024-11-27 PROCEDURE — 2500000001 HC RX 250 WO HCPCS SELF ADMINISTERED DRUGS (ALT 637 FOR MEDICARE OP): Performed by: STUDENT IN AN ORGANIZED HEALTH CARE EDUCATION/TRAINING PROGRAM

## 2024-11-27 PROCEDURE — 1210000001 HC SEMI-PRIVATE ROOM DAILY

## 2024-11-27 PROCEDURE — 99231 SBSQ HOSP IP/OBS SF/LOW 25: CPT | Performed by: STUDENT IN AN ORGANIZED HEALTH CARE EDUCATION/TRAINING PROGRAM

## 2024-11-27 PROCEDURE — 2500000001 HC RX 250 WO HCPCS SELF ADMINISTERED DRUGS (ALT 637 FOR MEDICARE OP): Performed by: HOSPITALIST

## 2024-11-27 ASSESSMENT — COGNITIVE AND FUNCTIONAL STATUS - GENERAL
TURNING FROM BACK TO SIDE WHILE IN FLAT BAD: A LITTLE
MOBILITY SCORE: 13
HELP NEEDED FOR BATHING: TOTAL
MOVING TO AND FROM BED TO CHAIR: A LOT
STANDING UP FROM CHAIR USING ARMS: A LOT
DRESSING REGULAR LOWER BODY CLOTHING: A LOT
DAILY ACTIVITIY SCORE: 7
WALKING IN HOSPITAL ROOM: TOTAL
DRESSING REGULAR UPPER BODY CLOTHING: TOTAL
EATING MEALS: TOTAL
TOILETING: TOTAL
PERSONAL GROOMING: TOTAL
CLIMB 3 TO 5 STEPS WITH RAILING: TOTAL

## 2024-11-27 ASSESSMENT — PAIN SCALES - WONG BAKER
WONGBAKER_NUMERICALRESPONSE: NO HURT
WONGBAKER_NUMERICALRESPONSE: NO HURT

## 2024-11-27 ASSESSMENT — PAIN SCALES - GENERAL
PAINLEVEL_OUTOF10: 0 - NO PAIN
PAINLEVEL_OUTOF10: 0 - NO PAIN

## 2024-11-27 NOTE — PROGRESS NOTES
"Carolina Chowdhury is a 39 y.o. female on day 9 of admission presenting with increased secretions from group home      Subjective     Patient had no acute events overnight.  Laying in bed.  Patient continues to give thumbs up when asked how she is doing.  Shook head no when asked if she was in any pain or had any difficulty breathing.  Denies any abdominal pain.  Denied any further concerns.      Objective     Last Recorded Vitals  BP (!) 153/116 (BP Location: Left arm, Patient Position: Lying)   Pulse 102   Temp 36.5 °C (97.7 °F) (Temporal)   Resp 18   Ht 1.575 m (5' 2\")   Wt 48.6 kg (107 lb 2.3 oz)   SpO2 94%   BMI 19.60 kg/m²      Intake/Output last 3 Shifts:    Intake/Output Summary (Last 24 hours) at 11/27/2024 1107  Last data filed at 11/27/2024 0600  Gross per 24 hour   Intake 660 ml   Output 652 ml   Net 8 ml         Scheduled medications  budesonide, 0.5 mg, nebulization, BID  enoxaparin, 40 mg, subcutaneous, q24h  famotidine, 20 mg, oral, BID  FLUoxetine, 40 mg, oral, BID  glycopyrrolate, 2 mg, oral, TID  guaiFENesin, 200 mg, oral, BID  hydrOXYzine HCL, 50 mg, oral, BID  ipratropium-albuteroL, 3 mL, nebulization, TID  levETIRAcetam, 500 mg, g-tube, BID  lubricating eye drops, 2 drop, Both Eyes, BID  metoclopramide, 10 mg, oral, TID  metoprolol succinate XL, 12.5 mg, oral, Daily  montelukast, 10 mg, oral, Nightly  OLANZapine, 2.5 mg, oral, BID  oxygen, , inhalation, Continuous - Inhalation  pantoprazole, 40 mg, oral, BID AC  polyethylene glycol, 17 g, oral, Daily  prazosin, 2 mg, oral, Nightly  traZODone, 100 mg, oral, Nightly      Continuous medications       PRN medications  PRN medications: acetaminophen **OR** acetaminophen    Physical Exam   General: Ill-appearing adult female in mild distress   HEENT: Clear sclera, EOMI, trachea midline, moist mucous membranes, trach in place  Extremities: No cyanosis or clubbing appreciated  Neurological: Spontaneously moves all extremities, awake, " alert  Psychiatric: Appropriate mood and affect  Skin: Warm, dry    Relevant Results  Lab Results   Component Value Date    WBC 7.4 11/19/2024    HGB 13.1 11/19/2024    HCT 40.1 11/19/2024    MCV 96 11/19/2024     11/19/2024     Lab Results   Component Value Date    GLUCOSE 69 (L) 11/19/2024    CALCIUM 8.4 (L) 11/19/2024     11/19/2024    K 3.9 11/19/2024    CO2 26 11/19/2024     (H) 11/19/2024    BUN 8 11/19/2024    CREATININE 0.62 11/19/2024         Assessment/Plan     39 year old female with history of MRDD admitted from group home for increased secretions     Chronic respiratory failure s/p post tracheostomy  Seen and examined   Clinically stable   Alert and awake   Follows commands   Trach care  No fever    Seizure disorder  No more seizures  Seizure precaution   Keppra p.o. twice daily 500 mg     S/p PEG tube on TF    Intellectually challenging    Anxiety continue with the Proza    Social work following for placement    DVT ppx: lovenox    DC planning: Patient was discharged on 11/22.  Dispo to LTC pending state clearance.  Remains medically cleared       Christiano Sullivan MD

## 2024-11-27 NOTE — PROGRESS NOTES
11/27/24 1552   Discharge Planning   Expected Discharge Disposition Inter  ([facility])     Updated clinical information sent to the facility. Awaiting PASRR level II results which are needed prior to NH admission.

## 2024-11-27 NOTE — CARE PLAN
The patient's goals for the shift include Labs WNL    The clinical goals for the shift include Labs WNL    Problem: Skin  Goal: Decreased wound size/increased tissue granulation at next dressing change  11/27/2024 0638 by Jyoti Benavides RN  Outcome: Progressing  11/26/2024 2204 by Jyoti Benavides RN  Flowsheets (Taken 11/26/2024 2204)  Decreased wound size/increased tissue granulation at next dressing change:   Utilize specialty bed per algorithm   Promote sleep for wound healing   Protective dressings over bony prominences  Goal: Participates in plan/prevention/treatment measures  11/27/2024 0638 by Jyoti Benavides RN  Outcome: Progressing  11/26/2024 2204 by Jyoti Benavides RN  Flowsheets (Taken 11/26/2024 2204)  Participates in plan/prevention/treatment measures:   Increase activity/out of bed for meals   Discuss with provider PT/OT consult   Elevate heels  Goal: Prevent/manage excess moisture  11/27/2024 0638 by Jyoti Benavides RN  Outcome: Progressing  11/26/2024 2204 by Jyoti Benavides RN  Flowsheets (Taken 11/26/2024 2204)  Prevent/manage excess moisture:   Use wicking fabric (obtain order)   Moisturize dry skin   Cleanse incontinence/protect with barrier cream   Monitor for/manage infection if present   Follow provider orders for dressing changes  Goal: Prevent/minimize sheer/friction injuries  11/27/2024 0638 by Jyoti Benavides RN  Outcome: Progressing  11/26/2024 2204 by Jyoti Benavides RN  Flowsheets (Taken 11/26/2024 2204)  Prevent/minimize sheer/friction injuries:   Utilize specialty bed per algorithm   Use pull sheet   Turn/reposition every 2 hours/use positioning/transfer devices   Increase activity/out of bed for meals   HOB 30 degrees or less   Complete micro-shifts as needed if patient unable. Adjust patient position to relieve pressure points, not a full turn  Goal: Promote/optimize nutrition  11/27/2024 0638 by Jyoti Benavides RN  Outcome: Progressing  11/26/2024 2204  by Jyoti Benavides RN  Flowsheets (Taken 11/26/2024 2204)  Promote/optimize nutrition:   Offer water/supplements/favorite foods   Discuss with provider if NPO > 2 days   Assist with feeding   Reassess MST if dietician not consulted   Consume > 50% meals/supplements   Monitor/record intake including meals  Goal: Promote skin healing  11/27/2024 0638 by Jyoti Benavides RN  Outcome: Progressing  11/26/2024 2204 by Jyoti Benavides RN  Flowsheets (Taken 11/26/2024 2204)  Promote skin healing:   Turn/reposition every 2 hours/use positioning/transfer devices   Rotate device position/do not position patient on device   Protective dressings over bony prominences   Ensure correct size (line/device) and apply per  instructions   Assess skin/pad under line(s)/device(s)     Problem: Fall/Injury  Goal: Be free from injury by end of the shift  Outcome: Progressing  Goal: Verbalize understanding of personal risk factors for fall in the hospital  Outcome: Progressing  Goal: Verbalize understanding of risk factor reduction measures to prevent injury from fall in the home  Outcome: Progressing  Goal: Use assistive devices by end of the shift  Outcome: Progressing  Goal: Pace activities to prevent fatigue by end of the shift  Outcome: Progressing  Goal: Not fall by end of shift  Outcome: Progressing     Problem: Nutrition  Goal: Oral intake greater 75%  Outcome: Progressing  Goal: Adequate PO fluid intake  Outcome: Progressing  Goal: Nutrition support is meeting 75% of nutrient needs  Outcome: Progressing  Goal: Tube feed tolerance  Outcome: Progressing  Goal: BG  mg/dL  Outcome: Progressing  Goal: Electrolytes WNL  Outcome: Progressing  Goal: Maintain stable weight  Outcome: Progressing     Problem: Pain - Adult  Goal: Verbalizes/displays adequate comfort level or baseline comfort level  Outcome: Progressing     Problem: Safety - Adult  Goal: Free from fall injury  Outcome: Progressing     Problem: Discharge  Planning  Goal: Discharge to home or other facility with appropriate resources  Outcome: Progressing     Problem: Chronic Conditions and Co-morbidities  Goal: Patient's chronic conditions and co-morbidity symptoms are monitored and maintained or improved  Outcome: Progressing     Problem: Pain  Goal: Takes deep breaths with improved pain control throughout the shift  Outcome: Progressing  Goal: Turns in bed with improved pain control throughout the shift  Outcome: Progressing  Goal: Walks with improved pain control throughout the shift  Outcome: Progressing  Goal: Performs ADL's with improved pain control throughout shift  Outcome: Progressing  Goal: Participates in PT with improved pain control throughout the shift  Outcome: Progressing  Goal: Free from opioid side effects throughout the shift  Outcome: Progressing  Goal: Free from acute confusion related to pain meds throughout the shift  Outcome: Progressing     Problem: Respiratory  Goal: Clear secretions with interventions this shift  Outcome: Progressing  Goal: Minimize anxiety/maximize coping throughout shift  Outcome: Progressing  Goal: Minimal/no exertional discomfort or dyspnea this shift  Outcome: Progressing  Goal: No signs of respiratory distress (eg. Use of accessory muscles. Peds grunting)  Outcome: Progressing  Goal: Patent airway maintained this shift  Outcome: Progressing  Goal: Tolerate mechanical ventilation evidenced by VS/agitation level this shift  Outcome: Progressing  Goal: Tolerate pulmonary toileting this shift  Outcome: Progressing  Goal: Verbalize decreased shortness of breath this shift  Outcome: Progressing  Goal: Wean oxygen to maintain O2 saturation per order/standard this shift  Outcome: Progressing  Goal: Increase self care and/or family involvement in next 24 hours  Outcome: Progressing

## 2024-11-27 NOTE — CARE PLAN
The patient's goals for the shift include Labs WNL    The clinical goals for the shift include labs WNL

## 2024-11-27 NOTE — PROGRESS NOTES
"Nutrition Follow Up Assessment:   Nutrition Assessment         Patient is a 39 y.o. female presenting with tracheostomy care      Nutrition History:  Energy Intake: Good > 75 %  Food and Nutrient History: RDN follow up. PO intake has been good. Pt is nonverbal, able to give thumbs up that appetite has been good and signed more then pointed to pop on bedside table. Pt continues to receive Osmolite bolus nightly. Will continue to monitor.  Food Allergies/Intolerances:  eggs, apple, oat, milk, strawberries, orange juice. Question accuracy of oat, milk, and apple allergies as a RD has asked at a previous admission and pt denied being allergic to these. Appears to have tolerated Osmolite 1.5 this admission. Of note, apparently receives Nutren outpatient, which would also contain milk.   GI Symptoms: None  Oral Problems: Swallowing difficulty       Anthropometrics:  Height: 157.5 cm (5' 2\")   Weight: 48.6 kg (107 lb 2.3 oz)   BMI (Calculated): 19.59  IBW/kg (Dietitian Calculated): 50 kg          Weight History:   Wt Readings from Last 10 Encounters:   11/27/24 48.6 kg (107 lb 2.3 oz)   11/15/24 49 kg (108 lbs)   06/26/24 49.9 kg (110 lb)   06/05/24 49.9 kg (110 lb)   06/03/24 (!) 37 kg (81 lb 9.1 oz)   04/27/24 (!) 36.3 kg (80 lb)   03/15/24 (!) 40 kg (88 lb 2.9 oz)   10/22/21 36.1 kg (79 lb 9.6 oz)   08/27/21 37.6 kg (83 lb)   07/31/21 (!) 43 kg (94 lb 12.8 oz)       Weight Change %:  Weight History / % Weight Change: wt stable since last assessment    Nutrition Focused Physical Exam Findings:  Pt with hx CP, appears to be well nourished for disease state   Subcutaneous Fat Loss:      Muscle Wasting:     Edema:  Edema: none  Physical Findings:  Skin: Negative    Nutrition Significant Labs:  BMP Trend:    , A1C:No results found for: \"HGBA1C\", BG POCT trend:        Nutrition Specific Medications:  Scheduled medications  guaiFENesin, 200 mg, oral, BID  pantoprazole, 40 mg, oral, BID AC  polyethylene glycol, 17 g, oral, " Daily    I/O:   Last BM Date: 11/26/24; Stool Appearance: Soft (11/27/24 1005)    Dietary Orders (From admission, onward)       Start     Ordered    11/19/24 1946  Enteral feeding WITH diet order Diet type: Regular; Texture: Pureed 4; Fluid consistency: Thin 0; Tube feeding formula: Osmolite 1.5; 240; nightly  Continuous        Question Answer Comment   Diet type Regular    Texture Pureed 4    Fluid consistency Thin 0    Tube feeding formula: Osmolite 1.5    Tube feeding bolus (mL): 240    Tube feeding bolus frequency: nightly        11/19/24 1945 11/17/24 0827  May Not Participate in Room Service  ( ROOM SERVICE MAY NOT PARTICIPATE)  Once        Question:  .  Answer:  Yes    11/17/24 0826                     Estimated Needs:   Total Energy Estimated Needs (kCal): 1470 kCal  Method for Estimating Needs: 30 kcal/kg  Total Protein Estimated Needs (g): 59 g  Method for Estimating Needs: 1.2 g/kg  Total Fluid Estimated Needs (mL): 1470 mL  Method for Estimating Needs: 1 ml/kcal or per MD        Nutrition Diagnosis   Malnutrition Diagnosis  Patient has Malnutrition Diagnosis: No    Nutrition Diagnosis  Patient has Nutrition Diagnosis: Yes  Diagnosis Status (1): Ongoing  Nutrition Diagnosis 1: Swallowing difficulity  Related to (1): h/o MRDD and CP s/p trach and PEG  As Evidenced by (1): SLP recommending pureed/thin liquids, need for supplemental TF       Nutrition Interventions/Recommendations         Nutrition Prescription:  Individualized Nutrition Prescription Provided for : Continue current diet regimen        Nutrition Interventions:   Interventions: Meals and snacks, Enteral intake  Meals and Snacks: Texture-modified diet, General healthful diet  Goal: Consumes 3 meals per day  Enteral Intake: Enteral nutrition site care, Modify concentration of enteral nutrition, Modify rate of enteral nutrition  Goal: 240 ml bolus via PEG tube of Osmolite 1.5 (provides 360 kcal, 15 g protein and 183 ml free water). 50 ml  water flush before and after bolus feed         Nutrition Education:   Not appropriate       Nutrition Monitoring and Evaluation   Food/Nutrient Related History Monitoring  Monitoring and Evaluation Plan: Energy intake, Amount of food, Fluid intake  Energy Intake: Estimated energy intake  Criteria: Pt meets >75% of estimated energy needs - met  Fluid Intake: Estimated fluid intake  Criteria: Meets >75% of estimated fluid needs - met  Amount of Food: Estimated amout of food, Medical food intake  Criteria: Pt consumes >75% of meals and supplements - met  Enteral and Parenteral Nutrition Intake: Enteral nutrition intake  Criteria: tolerate TF bolus - met    Body Composition/Growth/Weight History  Monitoring and Evaluation Plan: Weight  Weight: Measured weight  Criteria: Maintains stable weight - met    Biochemical Data, Medical Tests and Procedures  Monitoring and Evaluation Plan: Glucose/endocrine profile, Electrolyte/renal panel  Electrolyte and Renal Panel: Sodium, Potassium, Phosphorus  Criteria: Electrolytes WNL - unable to assess, no new labs  Glucose/Endocrine Profile: Glucose, casual  Criteria: BG within desirable range - unable to assess, no new labs              Time Spent (min): 30 minutes

## 2024-11-28 PROCEDURE — 2500000005 HC RX 250 GENERAL PHARMACY W/O HCPCS: Performed by: HOSPITALIST

## 2024-11-28 PROCEDURE — 99231 SBSQ HOSP IP/OBS SF/LOW 25: CPT | Performed by: STUDENT IN AN ORGANIZED HEALTH CARE EDUCATION/TRAINING PROGRAM

## 2024-11-28 PROCEDURE — 2500000004 HC RX 250 GENERAL PHARMACY W/ HCPCS (ALT 636 FOR OP/ED): Performed by: INTERNAL MEDICINE

## 2024-11-28 PROCEDURE — 2500000001 HC RX 250 WO HCPCS SELF ADMINISTERED DRUGS (ALT 637 FOR MEDICARE OP): Performed by: HOSPITALIST

## 2024-11-28 PROCEDURE — 2500000001 HC RX 250 WO HCPCS SELF ADMINISTERED DRUGS (ALT 637 FOR MEDICARE OP): Performed by: STUDENT IN AN ORGANIZED HEALTH CARE EDUCATION/TRAINING PROGRAM

## 2024-11-28 PROCEDURE — 2500000002 HC RX 250 W HCPCS SELF ADMINISTERED DRUGS (ALT 637 FOR MEDICARE OP, ALT 636 FOR OP/ED): Performed by: HOSPITALIST

## 2024-11-28 PROCEDURE — 94640 AIRWAY INHALATION TREATMENT: CPT

## 2024-11-28 PROCEDURE — 1210000001 HC SEMI-PRIVATE ROOM DAILY

## 2024-11-28 PROCEDURE — 2500000001 HC RX 250 WO HCPCS SELF ADMINISTERED DRUGS (ALT 637 FOR MEDICARE OP): Performed by: INTERNAL MEDICINE

## 2024-11-28 ASSESSMENT — PAIN SCALES - GENERAL: PAINLEVEL_OUTOF10: 0 - NO PAIN

## 2024-11-28 NOTE — CARE PLAN
The patient's goals for the shift include Labs WNL    The clinical goals for the shift include tolerate bolus feed

## 2024-11-28 NOTE — CARE PLAN
The patient's goals for the shift include Labs WNL  Problem: Skin  Goal: Decreased wound size/increased tissue granulation at next dressing change  Outcome: Progressing  Goal: Participates in plan/prevention/treatment measures  Outcome: Progressing  Goal: Prevent/manage excess moisture  Outcome: Progressing  Goal: Prevent/minimize sheer/friction injuries  Outcome: Progressing  Goal: Promote/optimize nutrition  Outcome: Progressing  Goal: Promote skin healing  Outcome: Progressing     Problem: Fall/Injury  Goal: Be free from injury by end of the shift  Outcome: Progressing  Goal: Verbalize understanding of personal risk factors for fall in the hospital  Outcome: Progressing  Goal: Verbalize understanding of risk factor reduction measures to prevent injury from fall in the home  Outcome: Progressing  Goal: Use assistive devices by end of the shift  Outcome: Progressing  Goal: Pace activities to prevent fatigue by end of the shift  Outcome: Progressing  Goal: Not fall by end of shift  Outcome: Progressing     Problem: Nutrition  Goal: Oral intake greater 75%  Outcome: Progressing  Goal: Adequate PO fluid intake  Outcome: Progressing  Goal: Nutrition support is meeting 75% of nutrient needs  Outcome: Progressing  Goal: Tube feed tolerance  Outcome: Progressing  Goal: BG  mg/dL  Outcome: Progressing  Goal: Electrolytes WNL  Outcome: Progressing  Goal: Maintain stable weight  Outcome: Progressing     Problem: Pain - Adult  Goal: Verbalizes/displays adequate comfort level or baseline comfort level  Outcome: Progressing     Problem: Safety - Adult  Goal: Free from fall injury  Outcome: Progressing     Problem: Discharge Planning  Goal: Discharge to home or other facility with appropriate resources  Outcome: Progressing     Problem: Chronic Conditions and Co-morbidities  Goal: Patient's chronic conditions and co-morbidity symptoms are monitored and maintained or improved  Outcome: Progressing     Problem: Pain  Goal:  Takes deep breaths with improved pain control throughout the shift  Outcome: Progressing  Goal: Turns in bed with improved pain control throughout the shift  Outcome: Progressing  Goal: Walks with improved pain control throughout the shift  Outcome: Progressing  Goal: Performs ADL's with improved pain control throughout shift  Outcome: Progressing  Goal: Participates in PT with improved pain control throughout the shift  Outcome: Progressing  Goal: Free from opioid side effects throughout the shift  Outcome: Progressing  Goal: Free from acute confusion related to pain meds throughout the shift  Outcome: Progressing     Problem: Respiratory  Goal: Clear secretions with interventions this shift  Outcome: Progressing  Goal: Minimize anxiety/maximize coping throughout shift  Outcome: Progressing  Goal: Minimal/no exertional discomfort or dyspnea this shift  Outcome: Progressing  Goal: No signs of respiratory distress (eg. Use of accessory muscles. Peds grunting)  Outcome: Progressing  Goal: Patent airway maintained this shift  Outcome: Progressing  Goal: Tolerate mechanical ventilation evidenced by VS/agitation level this shift  Outcome: Progressing  Goal: Tolerate pulmonary toileting this shift  Outcome: Progressing  Goal: Verbalize decreased shortness of breath this shift  Outcome: Progressing  Goal: Wean oxygen to maintain O2 saturation per order/standard this shift  Outcome: Progressing  Goal: Increase self care and/or family involvement in next 24 hours  Outcome: Progressing       The clinical goals for the shift include labs WNL

## 2024-11-28 NOTE — CARE PLAN
Problem: Skin  Goal: Decreased wound size/increased tissue granulation at next dressing change  11/28/2024 0240 by Letty Johnson RN  Outcome: Progressing  11/28/2024 0238 by Letty Johnson RN  Outcome: Progressing  11/28/2024 0218 by Letty Johnson RN  Outcome: Progressing     Problem: Skin  Goal: Decreased wound size/increased tissue granulation at next dressing change  11/28/2024 0240 by Letty Johnson RN  Outcome: Progressing  11/28/2024 0238 by Letty Johnson RN  Outcome: Progressing  11/28/2024 0218 by Letty Johnson RN  Outcome: Progressing  Goal: Participates in plan/prevention/treatment measures  11/28/2024 0240 by Letty Johnson RN  Outcome: Progressing  11/28/2024 0238 by Letty Johnson RN  Outcome: Progressing  11/28/2024 0218 by Letty Johnson RN  Outcome: Progressing  Goal: Prevent/manage excess moisture  11/28/2024 0240 by Letty Johnson RN  Outcome: Progressing  11/28/2024 0238 by Letty Johnson RN  Outcome: Progressing  11/28/2024 0218 by Letty Johnson RN  Outcome: Progressing  Goal: Prevent/minimize sheer/friction injuries  11/28/2024 0240 by Letty Johnson RN  Outcome: Progressing  11/28/2024 0238 by Letty Johnson RN  Outcome: Progressing  11/28/2024 0218 by Letty Johnson RN  Outcome: Progressing  Goal: Promote/optimize nutrition  11/28/2024 0240 by Letty Johnson RN  Outcome: Progressing  11/28/2024 0238 by Letty Johnson RN  Outcome: Progressing  11/28/2024 0218 by Letty Johnson RN  Outcome: Progressing  Goal: Promote skin healing  11/28/2024 0240 by Letty Johnson RN  Outcome: Progressing  11/28/2024 0238 by Letty Johnson RN  Outcome: Progressing  11/28/2024 0218 by Letty Johnson RN  Outcome: Progressing     Problem: Fall/Injury  Goal: Be free from injury by end of the shift  11/28/2024 0238 by Letty Johnson RN  Outcome: Progressing  11/28/2024 0218 by Letty Johnson RN  Outcome: Progressing  Goal: Verbalize understanding of personal risk  factors for fall in the hospital  11/28/2024 0238 by Letty Johnson RN  Outcome: Progressing  11/28/2024 0218 by Letty Johnson RN  Outcome: Progressing  Goal: Verbalize understanding of risk factor reduction measures to prevent injury from fall in the home  11/28/2024 0238 by Letty Johnson RN  Outcome: Progressing  11/28/2024 0218 by Letty Johnson RN  Outcome: Progressing  Goal: Use assistive devices by end of the shift  11/28/2024 0238 by Letty Johnson RN  Outcome: Progressing  11/28/2024 0218 by Letty Johnson RN  Outcome: Progressing  Goal: Pace activities to prevent fatigue by end of the shift  11/28/2024 0238 by Letty Johnson RN  Outcome: Progressing  11/28/2024 0218 by Letty Johnson RN  Outcome: Progressing  Goal: Not fall by end of shift  11/28/2024 0238 by Letty Johnson RN  Outcome: Progressing  11/28/2024 0218 by Letty Johnson RN  Outcome: Progressing     Problem: Nutrition  Goal: Oral intake greater 75%  11/28/2024 0238 by Letty Johnson RN  Outcome: Progressing  11/28/2024 0218 by Letty Johnson RN  Outcome: Progressing  Goal: Adequate PO fluid intake  11/28/2024 0238 by Letty Johnson RN  Outcome: Progressing  11/28/2024 0218 by Letty Johnson RN  Outcome: Progressing  Goal: Nutrition support is meeting 75% of nutrient needs  11/28/2024 0238 by Letty Johnson RN  Outcome: Progressing  11/28/2024 0218 by Letty Johnson RN  Outcome: Progressing  Goal: Tube feed tolerance  11/28/2024 0238 by Letty Johnson RN  Outcome: Progressing  11/28/2024 0218 by Letty Johnson RN  Outcome: Progressing  Goal: BG  mg/dL  11/28/2024 0238 by Letty Johnson RN  Outcome: Progressing  11/28/2024 0218 by Letty Johnson RN  Outcome: Progressing  Goal: Electrolytes WNL  11/28/2024 0238 by Letty Johnson RN  Outcome: Progressing  11/28/2024 0218 by Letty Johnson RN  Outcome: Progressing  Goal: Maintain stable weight  11/28/2024 0238 by Letty Johnson, RN  Outcome:  Progressing  11/28/2024 0218 by Letty Johnson RN  Outcome: Progressing     Problem: Pain - Adult  Goal: Verbalizes/displays adequate comfort level or baseline comfort level  11/28/2024 0238 by Letty Johnson RN  Outcome: Progressing  11/28/2024 0218 by Letty Johnson RN  Outcome: Progressing     Problem: Safety - Adult  Goal: Free from fall injury  11/28/2024 0238 by Letty Johnson RN  Outcome: Progressing  11/28/2024 0218 by Letty Johnson RN  Outcome: Progressing     Problem: Discharge Planning  Goal: Discharge to home or other facility with appropriate resources  11/28/2024 0238 by Letty Johnson RN  Outcome: Progressing  11/28/2024 0218 by Letty Johnson RN  Outcome: Progressing     Problem: Chronic Conditions and Co-morbidities  Goal: Patient's chronic conditions and co-morbidity symptoms are monitored and maintained or improved  11/28/2024 0238 by Letty Johnson RN  Outcome: Progressing  11/28/2024 0218 by Letty Johnson RN  Outcome: Progressing     Problem: Pain  Goal: Takes deep breaths with improved pain control throughout the shift  11/28/2024 0238 by Letty Johnson RN  Outcome: Progressing  11/28/2024 0218 by Letty Johnson RN  Outcome: Progressing  Goal: Turns in bed with improved pain control throughout the shift  11/28/2024 0238 by Letty Johnson RN  Outcome: Progressing  11/28/2024 0218 by Letty Johnson RN  Outcome: Progressing  Goal: Walks with improved pain control throughout the shift  11/28/2024 0238 by Letty Johnson RN  Outcome: Progressing  11/28/2024 0218 by Letty Johnson RN  Outcome: Progressing  Goal: Performs ADL's with improved pain control throughout shift  11/28/2024 0238 by Letty Johnson RN  Outcome: Progressing  11/28/2024 0218 by Letty Johnson RN  Outcome: Progressing  Goal: Participates in PT with improved pain control throughout the shift  11/28/2024 0238 by Letty Johnson RN  Outcome: Progressing  11/28/2024 0218 by Letty Johnson  RN  Outcome: Progressing  Goal: Free from opioid side effects throughout the shift  11/28/2024 0238 by Letty Johnson RN  Outcome: Progressing  11/28/2024 0218 by Letty Johnson RN  Outcome: Progressing  Goal: Free from acute confusion related to pain meds throughout the shift  11/28/2024 0238 by Letty Johnson RN  Outcome: Progressing  11/28/2024 0218 by Letty Johnson RN  Outcome: Progressing     Problem: Respiratory  Goal: Clear secretions with interventions this shift  11/28/2024 0238 by Letty Johnson RN  Outcome: Progressing  11/28/2024 0218 by Letty Johnson RN  Outcome: Progressing  Goal: Minimize anxiety/maximize coping throughout shift  11/28/2024 0238 by Letty Johnson RN  Outcome: Progressing  11/28/2024 0218 by Letty Johnson RN  Outcome: Progressing  Goal: Minimal/no exertional discomfort or dyspnea this shift  11/28/2024 0238 by Letty Johnson RN  Outcome: Progressing  11/28/2024 0218 by Letty Johnson RN  Outcome: Progressing  Goal: No signs of respiratory distress (eg. Use of accessory muscles. Peds grunting)  11/28/2024 0238 by Letty Johnson RN  Outcome: Progressing  11/28/2024 0218 by Letty Johnson RN  Outcome: Progressing  Goal: Patent airway maintained this shift  11/28/2024 0238 by Letty Johnson RN  Outcome: Progressing  11/28/2024 0218 by Letty Johnson RN  Outcome: Progressing  Goal: Tolerate mechanical ventilation evidenced by VS/agitation level this shift  11/28/2024 0238 by Letty Johnson RN  Outcome: Progressing  11/28/2024 0218 by Letty Johnson RN  Outcome: Progressing  Goal: Tolerate pulmonary toileting this shift  11/28/2024 0238 by Letty Johnson RN  Outcome: Progressing  11/28/2024 0218 by Letty Johnson RN  Outcome: Progressing  Goal: Verbalize decreased shortness of breath this shift  11/28/2024 0238 by Letty Johnson RN  Outcome: Progressing  11/28/2024 0218 by Letty Johnson RN  Outcome: Progressing  Goal: Wean oxygen to maintain O2  saturation per order/standard this shift  11/28/2024 0238 by Letty Johnson RN  Outcome: Progressing  11/28/2024 0218 by Letty Johnson RN  Outcome: Progressing  Goal: Increase self care and/or family involvement in next 24 hours  11/28/2024 0238 by Letty Johnson RN  Outcome: Progressing  11/28/2024 0218 by Letty Johnson RN  Outcome: Progressing     Problem: Skin  Goal: Decreased wound size/increased tissue granulation at next dressing change  11/28/2024 0240 by Letty Johnson RN  Outcome: Progressing  11/28/2024 0238 by Letty Johnson RN  Outcome: Progressing  11/28/2024 0218 by Letty Johnson RN  Outcome: Progressing  Goal: Participates in plan/prevention/treatment measures  11/28/2024 0240 by Letty Johnson RN  Outcome: Progressing  11/28/2024 0238 by Ltety Johnson RN  Outcome: Progressing  11/28/2024 0218 by Letty Johnson RN  Outcome: Progressing  Goal: Prevent/manage excess moisture  11/28/2024 0240 by Letty Johnson RN  Outcome: Progressing  11/28/2024 0238 by Letty Johnson RN  Outcome: Progressing  11/28/2024 0218 by Letty Johnson RN  Outcome: Progressing  Goal: Prevent/minimize sheer/friction injuries  11/28/2024 0240 by Letty Johnson RN  Outcome: Progressing  11/28/2024 0238 by Letty Johnson RN  Outcome: Progressing  11/28/2024 0218 by Letty Johnson RN  Outcome: Progressing  Goal: Promote/optimize nutrition  11/28/2024 0240 by Letty Johnson RN  Outcome: Progressing  11/28/2024 0238 by Letty Johnson RN  Outcome: Progressing  11/28/2024 0218 by Letty Johnson RN  Outcome: Progressing  Goal: Promote skin healing  11/28/2024 0240 by Letty Johnson RN  Outcome: Progressing  11/28/2024 0238 by Letty Johnson RN  Outcome: Progressing  11/28/2024 0218 by Letty Johnson RN  Outcome: Progressing     Problem: Fall/Injury  Goal: Be free from injury by end of the shift  11/28/2024 0238 by Letty Johnson, RN  Outcome: Progressing  11/28/2024 0218 by Letty Johnson,  RN  Outcome: Progressing  Goal: Verbalize understanding of personal risk factors for fall in the hospital  11/28/2024 0238 by Letty Johnson RN  Outcome: Progressing  11/28/2024 0218 by Letty Johnson RN  Outcome: Progressing  Goal: Verbalize understanding of risk factor reduction measures to prevent injury from fall in the home  11/28/2024 0238 by Letty Johnson RN  Outcome: Progressing  11/28/2024 0218 by Letty Johnson RN  Outcome: Progressing  Goal: Use assistive devices by end of the shift  11/28/2024 0238 by Letty Johnson RN  Outcome: Progressing  11/28/2024 0218 by Letty Johnson RN  Outcome: Progressing  Goal: Pace activities to prevent fatigue by end of the shift  11/28/2024 0238 by Letty Johnson RN  Outcome: Progressing  11/28/2024 0218 by Letty Johnson RN  Outcome: Progressing  Goal: Not fall by end of shift  11/28/2024 0238 by Letty Johnson RN  Outcome: Progressing  11/28/2024 0218 by Letty Johnson RN  Outcome: Progressing     Problem: Nutrition  Goal: Oral intake greater 75%  11/28/2024 0238 by Letty Johnson RN  Outcome: Progressing  11/28/2024 0218 by Letty Johnson RN  Outcome: Progressing  Goal: Adequate PO fluid intake  11/28/2024 0238 by Letty Johnson RN  Outcome: Progressing  11/28/2024 0218 by Letty Johnson RN  Outcome: Progressing  Goal: Nutrition support is meeting 75% of nutrient needs  11/28/2024 0238 by Letty Johnson RN  Outcome: Progressing  11/28/2024 0218 by Letty Johnson RN  Outcome: Progressing  Goal: Tube feed tolerance  11/28/2024 0238 by Letty Johnson RN  Outcome: Progressing  11/28/2024 0218 by Letty Johnson RN  Outcome: Progressing  Goal: BG  mg/dL  11/28/2024 0238 by Letty Johnson RN  Outcome: Progressing  11/28/2024 0218 by Letty Johnson RN  Outcome: Progressing  Goal: Electrolytes WNL  11/28/2024 0238 by Letty Johnson RN  Outcome: Progressing  11/28/2024 0218 by Letty Johnson, RN  Outcome: Progressing  Goal:  Maintain stable weight  11/28/2024 0238 by Letty Johnson RN  Outcome: Progressing  11/28/2024 0218 by Letty Johnson RN  Outcome: Progressing     Problem: Pain - Adult  Goal: Verbalizes/displays adequate comfort level or baseline comfort level  11/28/2024 0238 by Letty Johnson RN  Outcome: Progressing  11/28/2024 0218 by Letty Johnson RN  Outcome: Progressing     Problem: Safety - Adult  Goal: Free from fall injury  11/28/2024 0238 by Letty Johnson RN  Outcome: Progressing  11/28/2024 0218 by Letty Johnson RN  Outcome: Progressing     Problem: Discharge Planning  Goal: Discharge to home or other facility with appropriate resources  11/28/2024 0238 by Letty Johnson RN  Outcome: Progressing  11/28/2024 0218 by Letty Johnson RN  Outcome: Progressing     Problem: Chronic Conditions and Co-morbidities  Goal: Patient's chronic conditions and co-morbidity symptoms are monitored and maintained or improved  11/28/2024 0238 by Letty Johnson RN  Outcome: Progressing  11/28/2024 0218 by Letty Johnson RN  Outcome: Progressing     Problem: Pain  Goal: Takes deep breaths with improved pain control throughout the shift  11/28/2024 0238 by Letty Johnson RN  Outcome: Progressing  11/28/2024 0218 by Letty Johnson RN  Outcome: Progressing  Goal: Turns in bed with improved pain control throughout the shift  11/28/2024 0238 by Letty Johnson RN  Outcome: Progressing  11/28/2024 0218 by Letty Jonhson RN  Outcome: Progressing  Goal: Walks with improved pain control throughout the shift  11/28/2024 0238 by Letty Johnson RN  Outcome: Progressing  11/28/2024 0218 by Letty Johnson RN  Outcome: Progressing  Goal: Performs ADL's with improved pain control throughout shift  11/28/2024 0238 by Letty Johnson RN  Outcome: Progressing  11/28/2024 0218 by Letty Johnson RN  Outcome: Progressing  Goal: Participates in PT with improved pain control throughout the shift  11/28/2024 0238 by Letty  LALITA Johnson  Outcome: Progressing  11/28/2024 0218 by Letty Johnson RN  Outcome: Progressing  Goal: Free from opioid side effects throughout the shift  11/28/2024 0238 by Letty Johnson RN  Outcome: Progressing  11/28/2024 0218 by Letty Johnson RN  Outcome: Progressing  Goal: Free from acute confusion related to pain meds throughout the shift  11/28/2024 0238 by Letty Johnson RN  Outcome: Progressing  11/28/2024 0218 by Letty Johnson RN  Outcome: Progressing     Problem: Respiratory  Goal: Clear secretions with interventions this shift  11/28/2024 0238 by Letty Johnson RN  Outcome: Progressing  11/28/2024 0218 by Letty Johnson RN  Outcome: Progressing  Goal: Minimize anxiety/maximize coping throughout shift  11/28/2024 0238 by Letty Johnson RN  Outcome: Progressing  11/28/2024 0218 by Letty Johnson RN  Outcome: Progressing  Goal: Minimal/no exertional discomfort or dyspnea this shift  11/28/2024 0238 by Letty Johnson RN  Outcome: Progressing  11/28/2024 0218 by Letty Johnson RN  Outcome: Progressing  Goal: No signs of respiratory distress (eg. Use of accessory muscles. Peds grunting)  11/28/2024 0238 by Letty Johnson RN  Outcome: Progressing  11/28/2024 0218 by Letty Johnson RN  Outcome: Progressing  Goal: Patent airway maintained this shift  11/28/2024 0238 by Letty Johnson RN  Outcome: Progressing  11/28/2024 0218 by Letty Johnson RN  Outcome: Progressing  Goal: Tolerate mechanical ventilation evidenced by VS/agitation level this shift  11/28/2024 0238 by Letty Johnson RN  Outcome: Progressing  11/28/2024 0218 by Letty Johnson RN  Outcome: Progressing  Goal: Tolerate pulmonary toileting this shift  11/28/2024 0238 by Letty Johnson RN  Outcome: Progressing  11/28/2024 0218 by Letty Johnson RN  Outcome: Progressing  Goal: Verbalize decreased shortness of breath this shift  11/28/2024 0238 by Letty Johnson RN  Outcome: Progressing  11/28/2024 0218 by Letty  LALITA Johnson  Outcome: Progressing  Goal: Wean oxygen to maintain O2 saturation per order/standard this shift  11/28/2024 0238 by Letty Johnson RN  Outcome: Progressing  11/28/2024 0218 by Letty Johnson RN  Outcome: Progressing  Goal: Increase self care and/or family involvement in next 24 hours  11/28/2024 0238 by Letty Johnson RN  Outcome: Progressing  11/28/2024 0218 by Letty Johnson RN  Outcome: Progressing   The patient's goals for the shift include Labs WNL    The clinical goals for the shift include tolerate bolus feed

## 2024-11-28 NOTE — CARE PLAN
The patient's goals for the shift include Labs WNL    The clinical goals for the shift include tolerate bolus feed      Problem: Skin  Goal: Decreased wound size/increased tissue granulation at next dressing change  11/28/2024 0238 by Letty Johnson RN  Outcome: Progressing  11/28/2024 0218 by Letty Johnson RN  Outcome: Progressing  Goal: Participates in plan/prevention/treatment measures  11/28/2024 0238 by Letty Johnson RN  Outcome: Progressing  11/28/2024 0218 by Letty Johnson RN  Outcome: Progressing  Goal: Prevent/manage excess moisture  11/28/2024 0238 by Letty Johnson RN  Outcome: Progressing  11/28/2024 0218 by Letty Johnson RN  Outcome: Progressing  Goal: Prevent/minimize sheer/friction injuries  11/28/2024 0238 by Letty Johnson RN  Outcome: Progressing  11/28/2024 0218 by Letty Johnson RN  Outcome: Progressing  Goal: Promote/optimize nutrition  11/28/2024 0238 by Letty Johnson RN  Outcome: Progressing  11/28/2024 0218 by Letty Johnson RN  Outcome: Progressing  Goal: Promote skin healing  11/28/2024 0238 by Letty Johnson RN  Outcome: Progressing  11/28/2024 0218 by Letty Johnson RN  Outcome: Progressing     Problem: Fall/Injury  Goal: Be free from injury by end of the shift  11/28/2024 0238 by Letty Johnson RN  Outcome: Progressing  11/28/2024 0218 by Letty Johnson RN  Outcome: Progressing  Goal: Verbalize understanding of personal risk factors for fall in the hospital  11/28/2024 0238 by Letty Johnson RN  Outcome: Progressing  11/28/2024 0218 by Letty Johnson RN  Outcome: Progressing  Goal: Verbalize understanding of risk factor reduction measures to prevent injury from fall in the home  11/28/2024 0238 by Letty Johnson RN  Outcome: Progressing  11/28/2024 0218 by Letty Johnson RN  Outcome: Progressing  Goal: Use assistive devices by end of the shift  11/28/2024 0238 by Letty Johnson RN  Outcome: Progressing  11/28/2024 0218 by Letty Johnson  RN  Outcome: Progressing  Goal: Pace activities to prevent fatigue by end of the shift  11/28/2024 0238 by Letty Johnson RN  Outcome: Progressing  11/28/2024 0218 by Letty Johnson RN  Outcome: Progressing  Goal: Not fall by end of shift  11/28/2024 0238 by Letty Johnson RN  Outcome: Progressing  11/28/2024 0218 by Letty Johnson RN  Outcome: Progressing     Problem: Nutrition  Goal: Oral intake greater 75%  11/28/2024 0238 by Letty Johnson RN  Outcome: Progressing  11/28/2024 0218 by Letty Johnson RN  Outcome: Progressing  Goal: Adequate PO fluid intake  11/28/2024 0238 by Letty Johnson RN  Outcome: Progressing  11/28/2024 0218 by Letty Johnson RN  Outcome: Progressing  Goal: Nutrition support is meeting 75% of nutrient needs  11/28/2024 0238 by Letty Johnson RN  Outcome: Progressing  11/28/2024 0218 by Letty Johnson RN  Outcome: Progressing  Goal: Tube feed tolerance  11/28/2024 0238 by Letty Johnson RN  Outcome: Progressing  11/28/2024 0218 by Letty Johnson RN  Outcome: Progressing  Goal: BG  mg/dL  11/28/2024 0238 by Letty Johnson RN  Outcome: Progressing  11/28/2024 0218 by Letty Johnson RN  Outcome: Progressing  Goal: Electrolytes WNL  11/28/2024 0238 by Letty Johnson RN  Outcome: Progressing  11/28/2024 0218 by Letty Johnson RN  Outcome: Progressing  Goal: Maintain stable weight  11/28/2024 0238 by Letty Johnson RN  Outcome: Progressing  11/28/2024 0218 by Letty Johnson RN  Outcome: Progressing     Problem: Pain - Adult  Goal: Verbalizes/displays adequate comfort level or baseline comfort level  11/28/2024 0238 by Letty Johnson RN  Outcome: Progressing  11/28/2024 0218 by Letty Johnson RN  Outcome: Progressing     Problem: Safety - Adult  Goal: Free from fall injury  11/28/2024 0238 by Letty Johnson RN  Outcome: Progressing  11/28/2024 0218 by Letty Johnson RN  Outcome: Progressing     Problem: Discharge Planning  Goal: Discharge to home or  other facility with appropriate resources  11/28/2024 0238 by Letty Johnson RN  Outcome: Progressing  11/28/2024 0218 by Letty Johnson RN  Outcome: Progressing     Problem: Chronic Conditions and Co-morbidities  Goal: Patient's chronic conditions and co-morbidity symptoms are monitored and maintained or improved  11/28/2024 0238 by Letty Johnson RN  Outcome: Progressing  11/28/2024 0218 by Letty Johnson RN  Outcome: Progressing     Problem: Pain  Goal: Takes deep breaths with improved pain control throughout the shift  11/28/2024 0238 by Letty Johnson RN  Outcome: Progressing  11/28/2024 0218 by Letty Johnson RN  Outcome: Progressing  Goal: Turns in bed with improved pain control throughout the shift  11/28/2024 0238 by Letty Johnson RN  Outcome: Progressing  11/28/2024 0218 by Letty Johnson RN  Outcome: Progressing  Goal: Walks with improved pain control throughout the shift  11/28/2024 0238 by Letty Johnson RN  Outcome: Progressing  11/28/2024 0218 by Letty Johnson RN  Outcome: Progressing  Goal: Performs ADL's with improved pain control throughout shift  11/28/2024 0238 by Letty Johnson RN  Outcome: Progressing  11/28/2024 0218 by Letty Johnson RN  Outcome: Progressing  Goal: Participates in PT with improved pain control throughout the shift  11/28/2024 0238 by Letty Johnson RN  Outcome: Progressing  11/28/2024 0218 by Letty Johnson RN  Outcome: Progressing  Goal: Free from opioid side effects throughout the shift  11/28/2024 0238 by Letty Johnson RN  Outcome: Progressing  11/28/2024 0218 by Letty Johnson RN  Outcome: Progressing  Goal: Free from acute confusion related to pain meds throughout the shift  11/28/2024 0238 by Letty Johnson RN  Outcome: Progressing  11/28/2024 0218 by Letty Johnson RN  Outcome: Progressing     Problem: Respiratory  Goal: Clear secretions with interventions this shift  11/28/2024 0238 by Letty Johnson RN  Outcome:  Progressing  11/28/2024 0218 by Letty Johnson RN  Outcome: Progressing  Goal: Minimize anxiety/maximize coping throughout shift  11/28/2024 0238 by Letty Johnson RN  Outcome: Progressing  11/28/2024 0218 by Letty Johnson RN  Outcome: Progressing  Goal: Minimal/no exertional discomfort or dyspnea this shift  11/28/2024 0238 by Letty Johnson RN  Outcome: Progressing  11/28/2024 0218 by Letty Johnson RN  Outcome: Progressing  Goal: No signs of respiratory distress (eg. Use of accessory muscles. Peds grunting)  11/28/2024 0238 by Letty Johnson RN  Outcome: Progressing  11/28/2024 0218 by Letty Johnson RN  Outcome: Progressing  Goal: Patent airway maintained this shift  11/28/2024 0238 by Letty Johnson RN  Outcome: Progressing  11/28/2024 0218 by Letty Johnson RN  Outcome: Progressing  Goal: Tolerate mechanical ventilation evidenced by VS/agitation level this shift  11/28/2024 0238 by Letty Johnson RN  Outcome: Progressing  11/28/2024 0218 by Letty Johnson RN  Outcome: Progressing  Goal: Tolerate pulmonary toileting this shift  11/28/2024 0238 by Letty Johnson RN  Outcome: Progressing  11/28/2024 0218 by Letty Johnson RN  Outcome: Progressing  Goal: Verbalize decreased shortness of breath this shift  11/28/2024 0238 by Letty Johnson RN  Outcome: Progressing  11/28/2024 0218 by Letty Johnson RN  Outcome: Progressing  Goal: Wean oxygen to maintain O2 saturation per order/standard this shift  11/28/2024 0238 by Letty Johnson RN  Outcome: Progressing  11/28/2024 0218 by Letty Johnson RN  Outcome: Progressing  Goal: Increase self care and/or family involvement in next 24 hours  11/28/2024 0238 by Letty Johnson RN  Outcome: Progressing  11/28/2024 0218 by Letty Johnson RN  Outcome: Progressing

## 2024-11-28 NOTE — PROGRESS NOTES
"Carolina Chowdhury is a 39 y.o. female on day 10 of admission presenting with increased secretions from group home      Subjective     Patient had no acute events overnight.  Laying in bed.  Patient continues to give thumbs up when asked how she is doing.  Shook head no when asked if she was in any pain or had any difficulty breathing.   Denied any further concerns.      Objective     Last Recorded Vitals  /60 (BP Location: Right arm, Patient Position: Lying)   Pulse 93   Temp 36.4 °C (97.5 °F) (Temporal)   Resp 16   Ht 1.575 m (5' 2\")   Wt 48.6 kg (107 lb 2.3 oz)   SpO2 95%   BMI 19.60 kg/m²      Intake/Output last 3 Shifts:  No intake or output data in the 24 hours ending 11/28/24 1354        Scheduled medications  budesonide, 0.5 mg, nebulization, BID  enoxaparin, 40 mg, subcutaneous, q24h  famotidine, 20 mg, oral, BID  FLUoxetine, 40 mg, oral, BID  glycopyrrolate, 2 mg, oral, TID  guaiFENesin, 200 mg, oral, BID  hydrOXYzine HCL, 50 mg, oral, BID  ipratropium-albuteroL, 3 mL, nebulization, TID  levETIRAcetam, 500 mg, g-tube, BID  lubricating eye drops, 2 drop, Both Eyes, BID  metoclopramide, 10 mg, oral, TID  metoprolol succinate XL, 12.5 mg, oral, Daily  montelukast, 10 mg, oral, Nightly  OLANZapine, 2.5 mg, oral, BID  oxygen, , inhalation, Continuous - Inhalation  pantoprazole, 40 mg, oral, BID AC  polyethylene glycol, 17 g, oral, Daily  prazosin, 2 mg, oral, Nightly  traZODone, 100 mg, oral, Nightly      Continuous medications       PRN medications  PRN medications: acetaminophen **OR** acetaminophen    Physical Exam   General: Ill-appearing adult female in mild distress   HEENT: Clear sclera, EOMI, trachea midline, moist mucous membranes, trach in place  Extremities: No cyanosis or clubbing appreciated  Neurological: Spontaneously moves all extremities, awake, alert  Psychiatric: Appropriate mood and affect  Skin: Warm, dry    Relevant Results  Lab Results   Component Value Date    WBC 7.4 11/19/2024 "    HGB 13.1 11/19/2024    HCT 40.1 11/19/2024    MCV 96 11/19/2024     11/19/2024     Lab Results   Component Value Date    GLUCOSE 69 (L) 11/19/2024    CALCIUM 8.4 (L) 11/19/2024     11/19/2024    K 3.9 11/19/2024    CO2 26 11/19/2024     (H) 11/19/2024    BUN 8 11/19/2024    CREATININE 0.62 11/19/2024         Assessment/Plan     39 year old female with history of MRDD admitted from group home for increased secretions     Chronic respiratory failure s/p post tracheostomy  Seen and examined   Clinically stable   Alert and awake   Follows commands   Trach care  No fever    Seizure disorder  No more seizures  Seizure precaution   Keppra p.o. twice daily 500 mg     S/p PEG tube on TF    Intellectually challenging    Anxiety continue with the Prozac    Social work following for placement    DVT ppx: lovenox    DC planning: Patient was discharged on 11/22.  Dispo to LTC pending state clearance.  Remains medically cleared       Christiano Sullivan MD

## 2024-11-29 PROCEDURE — 2500000001 HC RX 250 WO HCPCS SELF ADMINISTERED DRUGS (ALT 637 FOR MEDICARE OP): Performed by: STUDENT IN AN ORGANIZED HEALTH CARE EDUCATION/TRAINING PROGRAM

## 2024-11-29 PROCEDURE — 94640 AIRWAY INHALATION TREATMENT: CPT

## 2024-11-29 PROCEDURE — 99232 SBSQ HOSP IP/OBS MODERATE 35: CPT | Performed by: STUDENT IN AN ORGANIZED HEALTH CARE EDUCATION/TRAINING PROGRAM

## 2024-11-29 PROCEDURE — 1210000001 HC SEMI-PRIVATE ROOM DAILY

## 2024-11-29 PROCEDURE — 2500000001 HC RX 250 WO HCPCS SELF ADMINISTERED DRUGS (ALT 637 FOR MEDICARE OP): Performed by: INTERNAL MEDICINE

## 2024-11-29 PROCEDURE — 2500000001 HC RX 250 WO HCPCS SELF ADMINISTERED DRUGS (ALT 637 FOR MEDICARE OP): Performed by: HOSPITALIST

## 2024-11-29 PROCEDURE — 2500000005 HC RX 250 GENERAL PHARMACY W/O HCPCS: Performed by: HOSPITALIST

## 2024-11-29 PROCEDURE — 2500000002 HC RX 250 W HCPCS SELF ADMINISTERED DRUGS (ALT 637 FOR MEDICARE OP, ALT 636 FOR OP/ED): Performed by: HOSPITALIST

## 2024-11-29 PROCEDURE — 2500000004 HC RX 250 GENERAL PHARMACY W/ HCPCS (ALT 636 FOR OP/ED): Performed by: INTERNAL MEDICINE

## 2024-11-29 ASSESSMENT — COGNITIVE AND FUNCTIONAL STATUS - GENERAL
STANDING UP FROM CHAIR USING ARMS: A LOT
HELP NEEDED FOR BATHING: TOTAL
PERSONAL GROOMING: TOTAL
CLIMB 3 TO 5 STEPS WITH RAILING: TOTAL
DRESSING REGULAR UPPER BODY CLOTHING: TOTAL
DRESSING REGULAR LOWER BODY CLOTHING: A LOT
EATING MEALS: TOTAL
PERSONAL GROOMING: TOTAL
DRESSING REGULAR UPPER BODY CLOTHING: TOTAL
MOBILITY SCORE: 12
DAILY ACTIVITIY SCORE: 7
DAILY ACTIVITIY SCORE: 7
STANDING UP FROM CHAIR USING ARMS: A LOT
TURNING FROM BACK TO SIDE WHILE IN FLAT BAD: A LOT
DRESSING REGULAR LOWER BODY CLOTHING: A LOT
EATING MEALS: TOTAL
TOILETING: TOTAL
WALKING IN HOSPITAL ROOM: TOTAL
MOVING TO AND FROM BED TO CHAIR: A LOT
MOVING TO AND FROM BED TO CHAIR: A LOT
TURNING FROM BACK TO SIDE WHILE IN FLAT BAD: A LOT
HELP NEEDED FOR BATHING: TOTAL
CLIMB 3 TO 5 STEPS WITH RAILING: TOTAL
TOILETING: TOTAL
WALKING IN HOSPITAL ROOM: TOTAL
MOBILITY SCORE: 12

## 2024-11-29 ASSESSMENT — PAIN SCALES - PAIN ASSESSMENT IN ADVANCED DEMENTIA (PAINAD)
BREATHING: NORMAL
TOTALSCORE: 0
BODYLANGUAGE: RELAXED
CONSOLABILITY: NO NEED TO CONSOLE
FACIALEXPRESSION: SMILING OR INEXPRESSIVE

## 2024-11-29 ASSESSMENT — PAIN SCALES - GENERAL: PAINLEVEL_OUTOF10: 0 - NO PAIN

## 2024-11-29 ASSESSMENT — PAIN SCALES - WONG BAKER: WONGBAKER_NUMERICALRESPONSE: NO HURT

## 2024-11-29 NOTE — CARE PLAN
The patient's goals for the shift include Labs WNL    The clinical goals for the shift include tolerate bolus feeds  Problem: Skin  Goal: Decreased wound size/increased tissue granulation at next dressing change  11/29/2024 0249 by Letty Johnson RN  Outcome: Progressing  11/29/2024 0217 by Letty Johnson RN  Outcome: Progressing  Goal: Participates in plan/prevention/treatment measures  11/29/2024 0249 by Letty Johnson RN  Outcome: Progressing  11/29/2024 0217 by Letty Johnson RN  Outcome: Progressing  Goal: Prevent/manage excess moisture  11/29/2024 0249 by Letty Johnson RN  Outcome: Progressing  11/29/2024 0217 by Letty Johnson RN  Outcome: Progressing  Goal: Prevent/minimize sheer/friction injuries  11/29/2024 0249 by Letty Johnson RN  Outcome: Progressing  11/29/2024 0217 by Letty Johnson RN  Outcome: Progressing  Goal: Promote/optimize nutrition  11/29/2024 0249 by Letty Johnson RN  Outcome: Progressing  11/29/2024 0217 by Letty Johnson RN  Outcome: Progressing  Goal: Promote skin healing  11/29/2024 0249 by Letty Johnson RN  Outcome: Progressing  11/29/2024 0217 by Letty Johnson RN  Outcome: Progressing     Problem: Fall/Injury  Goal: Be free from injury by end of the shift  11/29/2024 0249 by Letty Johnson RN  Outcome: Progressing  11/29/2024 0217 by Letty Johnson RN  Outcome: Progressing  Goal: Verbalize understanding of personal risk factors for fall in the hospital  11/29/2024 0249 by Letty Johnson RN  Outcome: Progressing  11/29/2024 0217 by Letty Johnson RN  Outcome: Progressing  Goal: Verbalize understanding of risk factor reduction measures to prevent injury from fall in the home  11/29/2024 0249 by Letty Johnson RN  Outcome: Progressing  11/29/2024 0217 by Letty Johnson RN  Outcome: Progressing  Goal: Use assistive devices by end of the shift  11/29/2024 0249 by Letty Johnson RN  Outcome: Progressing  11/29/2024 0217 by Letty Johnson  RN  Outcome: Progressing  Goal: Pace activities to prevent fatigue by end of the shift  11/29/2024 0249 by Letty Johnson RN  Outcome: Progressing  11/29/2024 0217 by Letty Johnson RN  Outcome: Progressing  Goal: Not fall by end of shift  11/29/2024 0249 by Letty Johnson RN  Outcome: Progressing  11/29/2024 0217 by Letty Johnson RN  Outcome: Progressing     Problem: Nutrition  Goal: Oral intake greater 75%  11/29/2024 0249 by Letty Johnson RN  Outcome: Progressing  11/29/2024 0217 by Letty Johnson RN  Outcome: Progressing  Goal: Adequate PO fluid intake  11/29/2024 0249 by Letty Johnson RN  Outcome: Progressing  11/29/2024 0217 by Letty Johnson RN  Outcome: Progressing  Goal: Nutrition support is meeting 75% of nutrient needs  11/29/2024 0249 by Letty Johnson RN  Outcome: Progressing  11/29/2024 0217 by Letty Johnson RN  Outcome: Progressing  Goal: Tube feed tolerance  11/29/2024 0249 by Letty Johnson RN  Outcome: Progressing  11/29/2024 0217 by Letty Johnson RN  Outcome: Progressing  Goal: BG  mg/dL  11/29/2024 0249 by Letty Johnson RN  Outcome: Progressing  11/29/2024 0217 by Letty Johnson RN  Outcome: Progressing  Goal: Electrolytes WNL  11/29/2024 0249 by Letty Johnson RN  Outcome: Progressing  11/29/2024 0217 by Letty Johnson RN  Outcome: Progressing  Goal: Maintain stable weight  11/29/2024 0249 by Letty Johnson RN  Outcome: Progressing  11/29/2024 0217 by Letty Johnson RN  Outcome: Progressing     Problem: Pain - Adult  Goal: Verbalizes/displays adequate comfort level or baseline comfort level  11/29/2024 0249 by Letty Johnson RN  Outcome: Progressing  11/29/2024 0217 by Letty Johnson RN  Outcome: Progressing     Problem: Safety - Adult  Goal: Free from fall injury  11/29/2024 0249 by Letty Johnson RN  Outcome: Progressing  11/29/2024 0217 by Letty Johnson RN  Outcome: Progressing     Problem: Discharge Planning  Goal: Discharge to home or  other facility with appropriate resources  11/29/2024 0249 by Letty Johnson RN  Outcome: Progressing  11/29/2024 0217 by Letty Johnson RN  Outcome: Progressing     Problem: Chronic Conditions and Co-morbidities  Goal: Patient's chronic conditions and co-morbidity symptoms are monitored and maintained or improved  11/29/2024 0249 by Letty Johnson RN  Outcome: Progressing  11/29/2024 0217 by Letty Johnson RN  Outcome: Progressing     Problem: Pain  Goal: Takes deep breaths with improved pain control throughout the shift  11/29/2024 0249 by Letty Johnson RN  Outcome: Progressing  11/29/2024 0217 by Letty Johnson RN  Outcome: Progressing  Goal: Turns in bed with improved pain control throughout the shift  11/29/2024 0249 by Letty Johnson RN  Outcome: Progressing  11/29/2024 0217 by Letty Johnson RN  Outcome: Progressing  Goal: Walks with improved pain control throughout the shift  11/29/2024 0249 by Letty Johnson RN  Outcome: Progressing  11/29/2024 0217 by Letty Johnson RN  Outcome: Progressing  Goal: Performs ADL's with improved pain control throughout shift  11/29/2024 0249 by Letty Johnson RN  Outcome: Progressing  11/29/2024 0217 by Letty Johnson RN  Outcome: Progressing  Goal: Participates in PT with improved pain control throughout the shift  11/29/2024 0249 by Letty Johnson RN  Outcome: Progressing  11/29/2024 0217 by Letty Johnson RN  Outcome: Progressing  Goal: Free from opioid side effects throughout the shift  11/29/2024 0249 by Letty Johnson RN  Outcome: Progressing  11/29/2024 0217 by Letty Johnson RN  Outcome: Progressing  Goal: Free from acute confusion related to pain meds throughout the shift  11/29/2024 0249 by Letty Johnson RN  Outcome: Progressing  11/29/2024 0217 by Letty Johnson RN  Outcome: Progressing     Problem: Respiratory  Goal: Clear secretions with interventions this shift  11/29/2024 0249 by Letty Johnson RN  Outcome:  Progressing  11/29/2024 0217 by Letty Johnson RN  Outcome: Progressing  Goal: Minimize anxiety/maximize coping throughout shift  11/29/2024 0249 by Letty Johnson RN  Outcome: Progressing  11/29/2024 0217 by Letty Johnson RN  Outcome: Progressing  Goal: Minimal/no exertional discomfort or dyspnea this shift  11/29/2024 0249 by Letty Johnson RN  Outcome: Progressing  11/29/2024 0217 by Letty Johnson RN  Outcome: Progressing  Goal: No signs of respiratory distress (eg. Use of accessory muscles. Peds grunting)  11/29/2024 0249 by Letty Johnson RN  Outcome: Progressing  11/29/2024 0217 by Letty Johnson RN  Outcome: Progressing  Goal: Patent airway maintained this shift  11/29/2024 0249 by Letty Johnson RN  Outcome: Progressing  11/29/2024 0217 by Letty Johnson RN  Outcome: Progressing  Goal: Tolerate mechanical ventilation evidenced by VS/agitation level this shift  11/29/2024 0249 by Letty Johnson RN  Outcome: Progressing  11/29/2024 0217 by Letty Johnson RN  Outcome: Progressing  Goal: Tolerate pulmonary toileting this shift  11/29/2024 0249 by Letty Johnson RN  Outcome: Progressing  11/29/2024 0217 by Letty Johnson RN  Outcome: Progressing  Goal: Verbalize decreased shortness of breath this shift  11/29/2024 0249 by Letty Johnson RN  Outcome: Progressing  11/29/2024 0217 by Letty Johnson RN  Outcome: Progressing  Goal: Wean oxygen to maintain O2 saturation per order/standard this shift  11/29/2024 0249 by Letty Johnson RN  Outcome: Progressing  11/29/2024 0217 by Letty Johnson RN  Outcome: Progressing  Goal: Increase self care and/or family involvement in next 24 hours  11/29/2024 0249 by Letty Johnson RN  Outcome: Progressing  11/29/2024 0217 by Letty Johnson RN  Outcome: Progressing

## 2024-11-29 NOTE — PROGRESS NOTES
11/29/24 1509   Discharge Planning   Type of Post Acute Facility Services Long term care   Expected Discharge Disposition Inter  ([facility])     Updated clinical information sent to the facility. Level II review remains pending. Per [facility], review can take up to 9 business days not including weekends and Holidays. Review was submitted on 11/20. Called and updated brother Afshin that Level II review remains pending, no questions or concerns at this time.

## 2024-11-29 NOTE — CARE PLAN
The patient's goals for the shift include Labs WNL    The clinical goals for the shift include tolerate bolus feed   Problem: Skin  Goal: Decreased wound size/increased tissue granulation at next dressing change  Outcome: Progressing  Goal: Participates in plan/prevention/treatment measures  Outcome: Progressing  Goal: Prevent/manage excess moisture  Outcome: Progressing  Goal: Prevent/minimize sheer/friction injuries  Outcome: Progressing  Goal: Promote/optimize nutrition  Outcome: Progressing  Goal: Promote skin healing  Outcome: Progressing     Problem: Fall/Injury  Goal: Be free from injury by end of the shift  Outcome: Progressing  Goal: Verbalize understanding of personal risk factors for fall in the hospital  Outcome: Progressing  Goal: Verbalize understanding of risk factor reduction measures to prevent injury from fall in the home  Outcome: Progressing  Goal: Use assistive devices by end of the shift  Outcome: Progressing  Goal: Pace activities to prevent fatigue by end of the shift  Outcome: Progressing  Goal: Not fall by end of shift  Outcome: Progressing     Problem: Nutrition  Goal: Oral intake greater 75%  Outcome: Progressing  Goal: Adequate PO fluid intake  Outcome: Progressing  Goal: Nutrition support is meeting 75% of nutrient needs  Outcome: Progressing  Goal: Tube feed tolerance  Outcome: Progressing  Goal: BG  mg/dL  Outcome: Progressing  Goal: Electrolytes WNL  Outcome: Progressing  Goal: Maintain stable weight  Outcome: Progressing     Problem: Pain - Adult  Goal: Verbalizes/displays adequate comfort level or baseline comfort level  Outcome: Progressing     Problem: Safety - Adult  Goal: Free from fall injury  Outcome: Progressing     Problem: Discharge Planning  Goal: Discharge to home or other facility with appropriate resources  Outcome: Progressing     Problem: Chronic Conditions and Co-morbidities  Goal: Patient's chronic conditions and co-morbidity symptoms are monitored and  maintained or improved  Outcome: Progressing     Problem: Pain  Goal: Takes deep breaths with improved pain control throughout the shift  Outcome: Progressing  Goal: Turns in bed with improved pain control throughout the shift  Outcome: Progressing  Goal: Walks with improved pain control throughout the shift  Outcome: Progressing  Goal: Performs ADL's with improved pain control throughout shift  Outcome: Progressing  Goal: Participates in PT with improved pain control throughout the shift  Outcome: Progressing  Goal: Free from opioid side effects throughout the shift  Outcome: Progressing  Goal: Free from acute confusion related to pain meds throughout the shift  Outcome: Progressing     Problem: Respiratory  Goal: Clear secretions with interventions this shift  Outcome: Progressing  Goal: Minimize anxiety/maximize coping throughout shift  Outcome: Progressing  Goal: Minimal/no exertional discomfort or dyspnea this shift  Outcome: Progressing  Goal: No signs of respiratory distress (eg. Use of accessory muscles. Peds grunting)  Outcome: Progressing  Goal: Patent airway maintained this shift  Outcome: Progressing  Goal: Tolerate mechanical ventilation evidenced by VS/agitation level this shift  Outcome: Progressing  Goal: Tolerate pulmonary toileting this shift  Outcome: Progressing  Goal: Verbalize decreased shortness of breath this shift  Outcome: Progressing  Goal: Wean oxygen to maintain O2 saturation per order/standard this shift  Outcome: Progressing  Goal: Increase self care and/or family involvement in next 24 hours  Outcome: Progressing

## 2024-11-29 NOTE — PROGRESS NOTES
"Carolina Chowdhury is a 39 y.o. female on day 11 of admission presenting with increased secretions from group home      Subjective     Patient had no acute events overnight. Patient continues to give thumbs up when asked how she is doing.  Shook head no when asked if she was in any pain or had any difficulty breathing. Signals that she wants to leave the hospital.  Denied any further concerns.      Objective     Last Recorded Vitals  /67 (BP Location: Right arm, Patient Position: Lying)   Pulse 85   Temp 36.8 °C (98.2 °F) (Temporal)   Resp 16   Ht 1.575 m (5' 2\")   Wt 48.6 kg (107 lb 2.3 oz)   SpO2 97%   BMI 19.60 kg/m²      Intake/Output last 3 Shifts:    Intake/Output Summary (Last 24 hours) at 11/29/2024 1252  Last data filed at 11/29/2024 0830  Gross per 24 hour   Intake 450 ml   Output --   Net 450 ml           Scheduled medications  budesonide, 0.5 mg, nebulization, BID  enoxaparin, 40 mg, subcutaneous, q24h  famotidine, 20 mg, oral, BID  FLUoxetine, 40 mg, oral, BID  glycopyrrolate, 2 mg, oral, TID  guaiFENesin, 200 mg, oral, BID  hydrOXYzine HCL, 50 mg, oral, BID  ipratropium-albuteroL, 3 mL, nebulization, TID  levETIRAcetam, 500 mg, g-tube, BID  lubricating eye drops, 2 drop, Both Eyes, BID  metoclopramide, 10 mg, oral, TID  metoprolol succinate XL, 12.5 mg, oral, Daily  montelukast, 10 mg, oral, Nightly  OLANZapine, 2.5 mg, oral, BID  oxygen, , inhalation, Continuous - Inhalation  pantoprazole, 40 mg, oral, BID AC  polyethylene glycol, 17 g, oral, Daily  prazosin, 2 mg, oral, Nightly  traZODone, 100 mg, oral, Nightly      Continuous medications       PRN medications  PRN medications: acetaminophen **OR** acetaminophen    Physical Exam   General: Ill-appearing adult female in mild distress   HEENT: Clear sclera, EOMI, trachea midline, moist mucous membranes, trach in place  Extremities: No cyanosis or clubbing appreciated  Neurological: Spontaneously moves all extremities, awake, alert  Psychiatric: " Appropriate mood and affect  Skin: Warm, dry    Relevant Results  Lab Results   Component Value Date    WBC 7.4 11/19/2024    HGB 13.1 11/19/2024    HCT 40.1 11/19/2024    MCV 96 11/19/2024     11/19/2024     Lab Results   Component Value Date    GLUCOSE 69 (L) 11/19/2024    CALCIUM 8.4 (L) 11/19/2024     11/19/2024    K 3.9 11/19/2024    CO2 26 11/19/2024     (H) 11/19/2024    BUN 8 11/19/2024    CREATININE 0.62 11/19/2024         Assessment/Plan     39 year old female with history of MRDD admitted from group home for increased secretions     Chronic respiratory failure s/p post tracheostomy  Seen and examined   Clinically stable   Alert and awake   Follows commands   Trach care  No fever    Seizure disorder  No more seizures  Seizure precaution   Keppra p.o. twice daily 500 mg     S/p PEG tube on TF    Intellectually challenging    Anxiety continue with the Prozac    Social work following for placement    DVT ppx: lovenox    DC planning: Patient was discharged on 11/22.  Dispo to LTC pending state clearance.  Remains medically cleared       Christiano Sullivan MD

## 2024-11-30 PROCEDURE — 1210000001 HC SEMI-PRIVATE ROOM DAILY

## 2024-11-30 PROCEDURE — 2500000004 HC RX 250 GENERAL PHARMACY W/ HCPCS (ALT 636 FOR OP/ED): Performed by: INTERNAL MEDICINE

## 2024-11-30 PROCEDURE — 2500000002 HC RX 250 W HCPCS SELF ADMINISTERED DRUGS (ALT 637 FOR MEDICARE OP, ALT 636 FOR OP/ED): Performed by: HOSPITALIST

## 2024-11-30 PROCEDURE — 2500000001 HC RX 250 WO HCPCS SELF ADMINISTERED DRUGS (ALT 637 FOR MEDICARE OP): Performed by: HOSPITALIST

## 2024-11-30 PROCEDURE — 2500000001 HC RX 250 WO HCPCS SELF ADMINISTERED DRUGS (ALT 637 FOR MEDICARE OP): Performed by: STUDENT IN AN ORGANIZED HEALTH CARE EDUCATION/TRAINING PROGRAM

## 2024-11-30 PROCEDURE — 94640 AIRWAY INHALATION TREATMENT: CPT

## 2024-11-30 PROCEDURE — 94760 N-INVAS EAR/PLS OXIMETRY 1: CPT

## 2024-11-30 PROCEDURE — 2500000005 HC RX 250 GENERAL PHARMACY W/O HCPCS: Performed by: HOSPITALIST

## 2024-11-30 PROCEDURE — 99231 SBSQ HOSP IP/OBS SF/LOW 25: CPT | Performed by: STUDENT IN AN ORGANIZED HEALTH CARE EDUCATION/TRAINING PROGRAM

## 2024-11-30 PROCEDURE — 2500000001 HC RX 250 WO HCPCS SELF ADMINISTERED DRUGS (ALT 637 FOR MEDICARE OP): Performed by: INTERNAL MEDICINE

## 2024-11-30 ASSESSMENT — PAIN SCALES - WONG BAKER
WONGBAKER_NUMERICALRESPONSE: HURTS LITTLE MORE
WONGBAKER_NUMERICALRESPONSE: HURTS LITTLE MORE
WONGBAKER_NUMERICALRESPONSE: NO HURT

## 2024-11-30 ASSESSMENT — COGNITIVE AND FUNCTIONAL STATUS - GENERAL
PERSONAL GROOMING: TOTAL
WALKING IN HOSPITAL ROOM: TOTAL
HELP NEEDED FOR BATHING: TOTAL
EATING MEALS: TOTAL
DRESSING REGULAR UPPER BODY CLOTHING: TOTAL
TOILETING: TOTAL
CLIMB 3 TO 5 STEPS WITH RAILING: TOTAL
STANDING UP FROM CHAIR USING ARMS: A LOT
MOVING TO AND FROM BED TO CHAIR: A LOT
TURNING FROM BACK TO SIDE WHILE IN FLAT BAD: A LOT
DRESSING REGULAR LOWER BODY CLOTHING: A LOT
MOBILITY SCORE: 12
DAILY ACTIVITIY SCORE: 7

## 2024-11-30 ASSESSMENT — PAIN SCALES - GENERAL
PAINLEVEL_OUTOF10: 0 - NO PAIN
PAINLEVEL_OUTOF10: 0 - NO PAIN

## 2024-11-30 ASSESSMENT — PAIN DESCRIPTION - LOCATION
LOCATION: MOUTH
LOCATION: HEAD

## 2024-11-30 NOTE — CARE PLAN
The patient's goals for the shift include Labs WNL    The clinical goals for the shift include pt will remain HDS this  shift      Problem: Fall/Injury  Goal: Be free from injury by end of the shift  Outcome: Met

## 2024-11-30 NOTE — CARE PLAN
The patient's goals for the shift include Labs WNL    The clinical goals for the shift include pt will remain HDS this  shift

## 2024-11-30 NOTE — PROGRESS NOTES
"Carolina Chowdhury is a 39 y.o. female on day 12 of admission presenting with increased secretions from group home      Subjective     Rapid response was called overnight due to respiratory distress. Distress was alleviated following deep suctioning. Patient feels well this morning. Gives a thumbs up when asked how she feels. Requests to have diet again. Denies any significant cough or difficulty breathing. Further ROS was unremarkable.     Objective     Last Recorded Vitals  /71 (Patient Position: Sitting)   Pulse 108   Temp 36.1 °C (97 °F)   Resp 20   Ht 1.575 m (5' 2\")   Wt 48.6 kg (107 lb 2.3 oz)   SpO2 95%   BMI 19.60 kg/m²      Intake/Output last 3 Shifts:    Intake/Output Summary (Last 24 hours) at 11/30/2024 1205  Last data filed at 11/29/2024 2300  Gross per 24 hour   Intake 750 ml   Output 0 ml   Net 750 ml           Scheduled medications  budesonide, 0.5 mg, nebulization, BID  enoxaparin, 40 mg, subcutaneous, q24h  famotidine, 20 mg, oral, BID  FLUoxetine, 40 mg, oral, BID  glycopyrrolate, 2 mg, oral, TID  guaiFENesin, 200 mg, oral, BID  hydrOXYzine HCL, 50 mg, oral, BID  ipratropium-albuteroL, 3 mL, nebulization, TID  levETIRAcetam, 500 mg, g-tube, BID  lubricating eye drops, 2 drop, Both Eyes, BID  metoclopramide, 10 mg, oral, TID  metoprolol succinate XL, 12.5 mg, oral, Daily  montelukast, 10 mg, oral, Nightly  OLANZapine, 2.5 mg, oral, BID  oxygen, , inhalation, Continuous - Inhalation  pantoprazole, 40 mg, oral, BID AC  polyethylene glycol, 17 g, oral, Daily  prazosin, 2 mg, oral, Nightly  traZODone, 100 mg, oral, Nightly      Continuous medications       PRN medications  PRN medications: acetaminophen **OR** acetaminophen    Physical Exam   General: Ill-appearing adult female in mild distress   HEENT: Clear sclera, EOMI, trachea midline, moist mucous membranes, trach in place  Extremities: No cyanosis or clubbing appreciated  Neurological: Spontaneously moves all extremities, awake, " alert  Psychiatric: Appropriate mood and affect  Skin: Warm, dry    Relevant Results  Lab Results   Component Value Date    WBC 7.4 11/19/2024    HGB 13.1 11/19/2024    HCT 40.1 11/19/2024    MCV 96 11/19/2024     11/19/2024     Lab Results   Component Value Date    GLUCOSE 69 (L) 11/19/2024    CALCIUM 8.4 (L) 11/19/2024     11/19/2024    K 3.9 11/19/2024    CO2 26 11/19/2024     (H) 11/19/2024    BUN 8 11/19/2024    CREATININE 0.62 11/19/2024         Assessment/Plan     39 year old female with history of MRDD admitted from group home for increased secretions     Chronic respiratory failure s/p post tracheostomy  Seen and examined   Clinically stable   Alert and awake   Follows commands   Trach care  No fever    Seizure disorder  No more seizures  Seizure precaution   Keppra p.o. twice daily 500 mg     S/p PEG tube on TF    Intellectually challenging    Anxiety continue with the Proza    Social work following for placement    DVT ppx: lovenox    DC planning: Patient was discharged on 11/22.  Dispo to LTC pending state clearance.  Remains medically cleared       Christiano Sullivan MD

## 2024-11-30 NOTE — NURSING NOTE
This RN was called to the bedside that patient had pulled her colostomy bag. Gathered supplies and was cleaning the pt when pt started grasping for air and holding her neck with audible sounds of inability to breath.  This activated Rapid response and proceeded to suction the pt. Thick mucus was observed in the tubing. Rapid response RN suctioned the patient some more and pt noded her head that she felt better. Pt HR that was elevated to 116 was seen trending down.  at bedside ordered Chest Xray. Pt is currently not on telemetry. Pt has sitter at bedside for safety.

## 2024-11-30 NOTE — SIGNIFICANT EVENT
0545 Overhead call for Rapid Response activated.  Pt had partial airway occlusion for primary RN and was struggling to move air (possible partial airway occlusion from a mucous plug).  RRT arrived, multiple personnel in room. Trach collar in place, pt tachycardic (had been 80s) pulse ox 90-91 (had been mid to upper 90s). I passed 14 Fr catheter for total of three very productive rounds of tracheal suctioning and I could observe very thick green mucous and also some dried thicker brown secretions that could not clear off the end of the suction catheter.   Attending ordered for a CXR.   Pt indicating that the emergency had passed and that she was fine.   Pt is on telemetry.

## 2024-11-30 NOTE — SIGNIFICANT EVENT
Responded to a rapid response call at about 0545 hrs.  Patient has a tracheostomy and PEG tube.  She was on respiratory distress.  She was found to be struggling for breathing.  Did not appear cyanotic, no diaphoresis.  SOP2 was low 90s.  Cardiac monitor showed sinus tachycardia rate of 118-125/min. She was alert and awake.  Could express her need with finger movement.  The respiratory therapist helped us to have deep suctioning through the trach collar.  Multiple suctions were given, thick green mucus came out in the suction tube.  At this patient gradually felt better.  Initially she was somewhat anxious during this process.      Most likely patient had mucous plugging and did not get suctions prior at regular intervals.  Her tube feeding was held.  Patient wanted to drink. I advised sponge swab and mouth care. No labs were drawn.  Chest x-ray was ordered to see if she has  aspiration pneumonitis.      Vital signs were stable. Bedside nurse was comfortable to take over the care.  Rapid response was ended without any major crisis.    KARIN MERINO MD  Internal Medicine & Pediatrics  Hospitalist, [facility]   Phone : [phone]

## 2024-12-01 VITALS
HEIGHT: 62 IN | DIASTOLIC BLOOD PRESSURE: 79 MMHG | HEART RATE: 72 BPM | SYSTOLIC BLOOD PRESSURE: 126 MMHG | TEMPERATURE: 98.4 F | RESPIRATION RATE: 16 BRPM | WEIGHT: 107.14 LBS | BODY MASS INDEX: 19.72 KG/M2 | OXYGEN SATURATION: 96 %

## 2024-12-01 PROCEDURE — 2500000001 HC RX 250 WO HCPCS SELF ADMINISTERED DRUGS (ALT 637 FOR MEDICARE OP): Performed by: INTERNAL MEDICINE

## 2024-12-01 PROCEDURE — 94760 N-INVAS EAR/PLS OXIMETRY 1: CPT

## 2024-12-01 PROCEDURE — 94640 AIRWAY INHALATION TREATMENT: CPT

## 2024-12-01 PROCEDURE — 99231 SBSQ HOSP IP/OBS SF/LOW 25: CPT | Performed by: STUDENT IN AN ORGANIZED HEALTH CARE EDUCATION/TRAINING PROGRAM

## 2024-12-01 PROCEDURE — 2500000004 HC RX 250 GENERAL PHARMACY W/ HCPCS (ALT 636 FOR OP/ED): Performed by: INTERNAL MEDICINE

## 2024-12-01 PROCEDURE — 1210000001 HC SEMI-PRIVATE ROOM DAILY

## 2024-12-01 PROCEDURE — 2500000005 HC RX 250 GENERAL PHARMACY W/O HCPCS: Performed by: HOSPITALIST

## 2024-12-01 PROCEDURE — 2500000001 HC RX 250 WO HCPCS SELF ADMINISTERED DRUGS (ALT 637 FOR MEDICARE OP): Performed by: HOSPITALIST

## 2024-12-01 PROCEDURE — 2500000002 HC RX 250 W HCPCS SELF ADMINISTERED DRUGS (ALT 637 FOR MEDICARE OP, ALT 636 FOR OP/ED): Performed by: HOSPITALIST

## 2024-12-01 PROCEDURE — 2500000001 HC RX 250 WO HCPCS SELF ADMINISTERED DRUGS (ALT 637 FOR MEDICARE OP): Performed by: STUDENT IN AN ORGANIZED HEALTH CARE EDUCATION/TRAINING PROGRAM

## 2024-12-01 ASSESSMENT — PAIN SCALES - GENERAL
PAINLEVEL_OUTOF10: 0 - NO PAIN
PAINLEVEL_OUTOF10: 3

## 2024-12-01 ASSESSMENT — PAIN DESCRIPTION - LOCATION: LOCATION: HEAD

## 2024-12-01 ASSESSMENT — COGNITIVE AND FUNCTIONAL STATUS - GENERAL
MOVING TO AND FROM BED TO CHAIR: A LOT
DRESSING REGULAR UPPER BODY CLOTHING: TOTAL
HELP NEEDED FOR BATHING: TOTAL
STANDING UP FROM CHAIR USING ARMS: A LOT
TURNING FROM BACK TO SIDE WHILE IN FLAT BAD: A LOT
DAILY ACTIVITIY SCORE: 7
PERSONAL GROOMING: TOTAL
MOBILITY SCORE: 12
DRESSING REGULAR LOWER BODY CLOTHING: A LOT
TOILETING: TOTAL
WALKING IN HOSPITAL ROOM: TOTAL
CLIMB 3 TO 5 STEPS WITH RAILING: TOTAL
EATING MEALS: TOTAL

## 2024-12-01 ASSESSMENT — PAIN DESCRIPTION - ORIENTATION: ORIENTATION: MID

## 2024-12-01 NOTE — CARE PLAN
The patient's goals for the shift include Rest   The clinical goals for the shift include Safety

## 2024-12-01 NOTE — CARE PLAN
The patient's goals for the shift include Labs WNL    The clinical goals for the shift include [place] safety throughout shift    Problem: Skin  Goal: Decreased wound size/increased tissue granulation at next dressing change  Outcome: Progressing  Goal: Participates in plan/prevention/treatment measures  Outcome: Progressing  Goal: Prevent/manage excess moisture  Outcome: Progressing  Goal: Prevent/minimize sheer/friction injuries  Outcome: Progressing  Goal: Promote/optimize nutrition  Outcome: Progressing  Goal: Promote skin healing  Outcome: Progressing     Problem: Fall/Injury  Goal: Verbalize understanding of personal risk factors for fall in the hospital  Outcome: Progressing  Goal: Verbalize understanding of risk factor reduction measures to prevent injury from fall in the home  Outcome: Progressing  Goal: Use assistive devices by end of the shift  Outcome: Progressing  Goal: Pace activities to prevent fatigue by end of the shift  Outcome: Progressing  Goal: Not fall by end of shift  Outcome: Progressing  Goal: Be free from injury by end of the shift  Outcome: Progressing     Problem: Nutrition  Goal: Oral intake greater 75%  Outcome: Progressing  Goal: Adequate PO fluid intake  Outcome: Progressing  Goal: Nutrition support is meeting 75% of nutrient needs  Outcome: Progressing  Goal: Tube feed tolerance  Outcome: Progressing  Goal: BG  mg/dL  Outcome: Progressing  Goal: Electrolytes WNL  Outcome: Progressing  Goal: Maintain stable weight  Outcome: Progressing     Problem: Pain - Adult  Goal: Verbalizes/displays adequate comfort level or baseline comfort level  Outcome: Progressing     Problem: Safety - Adult  Goal: Free from fall injury  Outcome: Progressing     Problem: Discharge Planning  Goal: Discharge to home or other facility with appropriate resources  Outcome: Progressing     Problem: Chronic Conditions and Co-morbidities  Goal: Patient's chronic conditions and co-morbidity symptoms are  monitored and maintained or improved  Outcome: Progressing     Problem: Pain  Goal: Takes deep breaths with improved pain control throughout the shift  Outcome: Progressing  Goal: Turns in bed with improved pain control throughout the shift  Outcome: Progressing  Goal: Walks with improved pain control throughout the shift  Outcome: Progressing  Goal: Performs ADL's with improved pain control throughout shift  Outcome: Progressing  Goal: Participates in PT with improved pain control throughout the shift  Outcome: Progressing  Goal: Free from opioid side effects throughout the shift  Outcome: Progressing  Goal: Free from acute confusion related to pain meds throughout the shift  Outcome: Progressing     Problem: Respiratory  Goal: Clear secretions with interventions this shift  Outcome: Progressing  Goal: Minimize anxiety/maximize coping throughout shift  Outcome: Progressing  Goal: Minimal/no exertional discomfort or dyspnea this shift  Outcome: Progressing  Goal: No signs of respiratory distress (eg. Use of accessory muscles. Peds grunting)  Outcome: Progressing  Goal: Patent airway maintained this shift  Outcome: Progressing  Goal: Tolerate mechanical ventilation evidenced by VS/agitation level this shift  Outcome: Progressing  Goal: Tolerate pulmonary toileting this shift  Outcome: Progressing  Goal: Verbalize decreased shortness of breath this shift  Outcome: Progressing  Goal: Wean oxygen to maintain O2 saturation per order/standard this shift  Outcome: Progressing  Goal: Increase self care and/or family involvement in next 24 hours  Outcome: Progressing

## 2024-12-01 NOTE — PROGRESS NOTES
"Carolina Chowdhury is a 39 y.o. female on day 13 of admission presenting with increased secretions from group home      Subjective     Patient had no acute events overnight. Gives thumbs up when asked how she feels. Shakes head no when asked if she has difficulty breathing. Denies any fevers or chills. Patient shook head no when asked if she had any further concerns.     Objective     Last Recorded Vitals  /78   Pulse 79   Temp 36.5 °C (97.7 °F) (Temporal)   Resp 20   Ht 1.575 m (5' 2\")   Wt 48.6 kg (107 lb 2.3 oz)   SpO2 98%   BMI 19.60 kg/m²      Intake/Output last 3 Shifts:    Intake/Output Summary (Last 24 hours) at 12/1/2024 1239  Last data filed at 11/30/2024 2120  Gross per 24 hour   Intake 150 ml   Output 0 ml   Net 150 ml           Scheduled medications  budesonide, 0.5 mg, nebulization, BID  enoxaparin, 40 mg, subcutaneous, q24h  famotidine, 20 mg, oral, BID  FLUoxetine, 40 mg, oral, BID  glycopyrrolate, 2 mg, oral, TID  guaiFENesin, 200 mg, oral, BID  hydrOXYzine HCL, 50 mg, oral, BID  ipratropium-albuteroL, 3 mL, nebulization, TID  levETIRAcetam, 500 mg, g-tube, BID  lubricating eye drops, 2 drop, Both Eyes, BID  metoclopramide, 10 mg, oral, TID  metoprolol succinate XL, 12.5 mg, oral, Daily  montelukast, 10 mg, oral, Nightly  OLANZapine, 2.5 mg, oral, BID  oxygen, , inhalation, Continuous - Inhalation  pantoprazole, 40 mg, oral, BID AC  polyethylene glycol, 17 g, oral, Daily  prazosin, 2 mg, oral, Nightly  traZODone, 100 mg, oral, Nightly      Continuous medications       PRN medications  PRN medications: acetaminophen **OR** acetaminophen    Physical Exam   General: Ill-appearing adult female in mild distress   HEENT: Clear sclera, EOMI, trachea midline, moist mucous membranes, trach in place  Extremities: No cyanosis or clubbing appreciated  Neurological: Spontaneously moves all extremities, awake, alert  Psychiatric: Appropriate mood and affect  Skin: Warm, dry    Relevant Results  Lab " Results   Component Value Date    WBC 7.4 11/19/2024    HGB 13.1 11/19/2024    HCT 40.1 11/19/2024    MCV 96 11/19/2024     11/19/2024     Lab Results   Component Value Date    GLUCOSE 69 (L) 11/19/2024    CALCIUM 8.4 (L) 11/19/2024     11/19/2024    K 3.9 11/19/2024    CO2 26 11/19/2024     (H) 11/19/2024    BUN 8 11/19/2024    CREATININE 0.62 11/19/2024         Assessment/Plan     39 year old female with history of MRDD admitted from group home for increased secretions     Chronic respiratory failure s/p post tracheostomy  Seen and examined   Clinically stable   Alert and awake   Follows commands   Trach care  No fever    Seizure disorder  No more seizures  Seizure precaution   Keppra p.o. twice daily 500 mg     S/p PEG tube on TF    Intellectually challenging    Anxiety continue with the Prozac    Social work following for placement    DVT ppx: lovenox    DC planning: Patient was discharged on 11/22.  Dispo to LTC pending state clearance.  Remains medically cleared       Christiano Sullivan MD

## 2024-12-02 PROCEDURE — 2500000005 HC RX 250 GENERAL PHARMACY W/O HCPCS: Performed by: HOSPITALIST

## 2024-12-02 PROCEDURE — 2500000001 HC RX 250 WO HCPCS SELF ADMINISTERED DRUGS (ALT 637 FOR MEDICARE OP): Performed by: HOSPITALIST

## 2024-12-02 PROCEDURE — 2500000002 HC RX 250 W HCPCS SELF ADMINISTERED DRUGS (ALT 637 FOR MEDICARE OP, ALT 636 FOR OP/ED): Performed by: HOSPITALIST

## 2024-12-02 PROCEDURE — 2500000001 HC RX 250 WO HCPCS SELF ADMINISTERED DRUGS (ALT 637 FOR MEDICARE OP): Performed by: STUDENT IN AN ORGANIZED HEALTH CARE EDUCATION/TRAINING PROGRAM

## 2024-12-02 PROCEDURE — 1210000001 HC SEMI-PRIVATE ROOM DAILY

## 2024-12-02 PROCEDURE — 2500000001 HC RX 250 WO HCPCS SELF ADMINISTERED DRUGS (ALT 637 FOR MEDICARE OP): Performed by: INTERNAL MEDICINE

## 2024-12-02 PROCEDURE — 94640 AIRWAY INHALATION TREATMENT: CPT

## 2024-12-02 PROCEDURE — 99231 SBSQ HOSP IP/OBS SF/LOW 25: CPT | Performed by: STUDENT IN AN ORGANIZED HEALTH CARE EDUCATION/TRAINING PROGRAM

## 2024-12-02 ASSESSMENT — COGNITIVE AND FUNCTIONAL STATUS - GENERAL
DRESSING REGULAR LOWER BODY CLOTHING: A LOT
WALKING IN HOSPITAL ROOM: TOTAL
HELP NEEDED FOR BATHING: TOTAL
STANDING UP FROM CHAIR USING ARMS: A LOT
CLIMB 3 TO 5 STEPS WITH RAILING: TOTAL
DRESSING REGULAR UPPER BODY CLOTHING: TOTAL
EATING MEALS: TOTAL
TOILETING: TOTAL
STANDING UP FROM CHAIR USING ARMS: A LOT
MOVING TO AND FROM BED TO CHAIR: A LOT
DRESSING REGULAR LOWER BODY CLOTHING: A LOT
DRESSING REGULAR UPPER BODY CLOTHING: TOTAL
MOVING TO AND FROM BED TO CHAIR: A LOT
TURNING FROM BACK TO SIDE WHILE IN FLAT BAD: A LOT
PERSONAL GROOMING: TOTAL
MOBILITY SCORE: 12
CLIMB 3 TO 5 STEPS WITH RAILING: TOTAL
PERSONAL GROOMING: TOTAL
DAILY ACTIVITIY SCORE: 7
TOILETING: TOTAL
WALKING IN HOSPITAL ROOM: TOTAL
MOBILITY SCORE: 12
DAILY ACTIVITIY SCORE: 7
TURNING FROM BACK TO SIDE WHILE IN FLAT BAD: A LOT
EATING MEALS: TOTAL
HELP NEEDED FOR BATHING: TOTAL

## 2024-12-02 ASSESSMENT — PAIN SCALES - GENERAL
PAINLEVEL_OUTOF10: 0 - NO PAIN
PAINLEVEL_OUTOF10: 10 - WORST POSSIBLE PAIN

## 2024-12-02 ASSESSMENT — PAIN DESCRIPTION - LOCATION: LOCATION: HEAD

## 2024-12-02 ASSESSMENT — PAIN SCALES - WONG BAKER: WONGBAKER_NUMERICALRESPONSE: NO HURT

## 2024-12-02 ASSESSMENT — PAIN - FUNCTIONAL ASSESSMENT: PAIN_FUNCTIONAL_ASSESSMENT: 0-10

## 2024-12-02 NOTE — PROGRESS NOTES
"Carolina Chowdhury is a 39 y.o. female on day 14 of admission presenting with increased secretions from group home      Subjective     Patient had no acute events overnight. Gave 2 thumbs up when asked how she felt. Shook head no when asked if she has difficulty breathing. Denies any fevers or chills. Patient shook head no when asked if she had any further concerns.     Objective     Last Recorded Vitals  /83 (BP Location: Left arm, Patient Position: Lying)   Pulse 75   Temp 36.8 °C (98.2 °F) (Temporal)   Resp 17   Ht 1.575 m (5' 2\")   Wt 48.6 kg (107 lb 2.3 oz)   SpO2 96%   BMI 19.60 kg/m²      Intake/Output last 3 Shifts:    Intake/Output Summary (Last 24 hours) at 12/2/2024 1007  Last data filed at 12/1/2024 2100  Gross per 24 hour   Intake 360 ml   Output --   Net 360 ml           Scheduled medications  budesonide, 0.5 mg, nebulization, BID  enoxaparin, 40 mg, subcutaneous, q24h  famotidine, 20 mg, oral, BID  FLUoxetine, 40 mg, oral, BID  glycopyrrolate, 2 mg, oral, TID  guaiFENesin, 200 mg, oral, BID  hydrOXYzine HCL, 50 mg, oral, BID  ipratropium-albuteroL, 3 mL, nebulization, TID  levETIRAcetam, 500 mg, g-tube, BID  lubricating eye drops, 2 drop, Both Eyes, BID  metoclopramide, 10 mg, oral, TID  metoprolol succinate XL, 12.5 mg, oral, Daily  montelukast, 10 mg, oral, Nightly  OLANZapine, 2.5 mg, oral, BID  oxygen, , inhalation, Continuous - Inhalation  pantoprazole, 40 mg, oral, BID AC  polyethylene glycol, 17 g, oral, Daily  prazosin, 2 mg, oral, Nightly  traZODone, 100 mg, oral, Nightly      Continuous medications       PRN medications  PRN medications: acetaminophen **OR** acetaminophen    Physical Exam   General: Ill-appearing adult female in mild distress   HEENT: Clear sclera, EOMI, trachea midline, moist mucous membranes, trach in place  Extremities: No cyanosis or clubbing appreciated  Neurological: Spontaneously moves all extremities, awake, alert  Psychiatric: Appropriate mood and " affect  Skin: Warm, dry    Relevant Results  Lab Results   Component Value Date    WBC 7.4 11/19/2024    HGB 13.1 11/19/2024    HCT 40.1 11/19/2024    MCV 96 11/19/2024     11/19/2024     Lab Results   Component Value Date    GLUCOSE 69 (L) 11/19/2024    CALCIUM 8.4 (L) 11/19/2024     11/19/2024    K 3.9 11/19/2024    CO2 26 11/19/2024     (H) 11/19/2024    BUN 8 11/19/2024    CREATININE 0.62 11/19/2024         Assessment/Plan     39 year old female with history of MRDD admitted from group home for increased secretions     Chronic respiratory failure s/p post tracheostomy  Seen and examined   Clinically stable   Alert and awake   Follows commands   Trach care  No fever    Seizure disorder  No more seizures  Seizure precaution   Keppra p.o. twice daily 500 mg     S/p PEG tube on TF    Intellectually challenging    Anxiety continue with the Prozac    Social work following for placement    DVT ppx: lovenox    DC planning: Patient was discharged on 11/22.  Dispo to LTC pending state clearance.  Remains medically cleared       Christiano Sullivan MD

## 2024-12-02 NOTE — CARE PLAN
The patient's goals for the shift include Safety .  The clinical goals for the shift include Patient to remain HDS.

## 2024-12-02 NOTE — CARE PLAN
The patient's goals for the shift include Labs WNL    The clinical goals for the shift include Patient to remain HDS throughout shift .

## 2024-12-03 ENCOUNTER — APPOINTMENT (OUTPATIENT)
Dept: RADIOLOGY | Facility: HOSPITAL | Age: 39
End: 2024-12-03
Payer: MEDICAID

## 2024-12-03 LAB
ERYTHROCYTE [DISTWIDTH] IN BLOOD BY AUTOMATED COUNT: 11.9 % (ref 11.5–14.5)
HCT VFR BLD AUTO: 40.4 % (ref 36–46)
HGB BLD-MCNC: 13.3 G/DL (ref 12–16)
HOLD SPECIMEN: NORMAL
MCH RBC QN AUTO: 31.1 PG (ref 26–34)
MCHC RBC AUTO-ENTMCNC: 32.9 G/DL (ref 32–36)
MCV RBC AUTO: 95 FL (ref 80–100)
NRBC BLD-RTO: 0 /100 WBCS (ref 0–0)
PLATELET # BLD AUTO: 314 X10*3/UL (ref 150–450)
RBC # BLD AUTO: 4.27 X10*6/UL (ref 4–5.2)
WBC # BLD AUTO: 12.9 X10*3/UL (ref 4.4–11.3)

## 2024-12-03 PROCEDURE — 2500000004 HC RX 250 GENERAL PHARMACY W/ HCPCS (ALT 636 FOR OP/ED): Performed by: INTERNAL MEDICINE

## 2024-12-03 PROCEDURE — 99232 SBSQ HOSP IP/OBS MODERATE 35: CPT | Performed by: HOSPITALIST

## 2024-12-03 PROCEDURE — 85027 COMPLETE CBC AUTOMATED: CPT | Performed by: HOSPITALIST

## 2024-12-03 PROCEDURE — 2500000002 HC RX 250 W HCPCS SELF ADMINISTERED DRUGS (ALT 637 FOR MEDICARE OP, ALT 636 FOR OP/ED): Performed by: HOSPITALIST

## 2024-12-03 PROCEDURE — 2500000001 HC RX 250 WO HCPCS SELF ADMINISTERED DRUGS (ALT 637 FOR MEDICARE OP): Performed by: HOSPITALIST

## 2024-12-03 PROCEDURE — 2500000001 HC RX 250 WO HCPCS SELF ADMINISTERED DRUGS (ALT 637 FOR MEDICARE OP): Performed by: STUDENT IN AN ORGANIZED HEALTH CARE EDUCATION/TRAINING PROGRAM

## 2024-12-03 PROCEDURE — 36415 COLL VENOUS BLD VENIPUNCTURE: CPT | Performed by: HOSPITALIST

## 2024-12-03 PROCEDURE — 71045 X-RAY EXAM CHEST 1 VIEW: CPT | Performed by: RADIOLOGY

## 2024-12-03 PROCEDURE — 71045 X-RAY EXAM CHEST 1 VIEW: CPT

## 2024-12-03 PROCEDURE — 1210000001 HC SEMI-PRIVATE ROOM DAILY

## 2024-12-03 PROCEDURE — 2500000001 HC RX 250 WO HCPCS SELF ADMINISTERED DRUGS (ALT 637 FOR MEDICARE OP): Performed by: INTERNAL MEDICINE

## 2024-12-03 PROCEDURE — 74018 RADEX ABDOMEN 1 VIEW: CPT | Performed by: RADIOLOGY

## 2024-12-03 PROCEDURE — 2500000005 HC RX 250 GENERAL PHARMACY W/O HCPCS: Performed by: HOSPITALIST

## 2024-12-03 PROCEDURE — 94640 AIRWAY INHALATION TREATMENT: CPT

## 2024-12-03 PROCEDURE — 87075 CULTR BACTERIA EXCEPT BLOOD: CPT | Mod: ELYLAB | Performed by: HOSPITALIST

## 2024-12-03 PROCEDURE — 74018 RADEX ABDOMEN 1 VIEW: CPT

## 2024-12-03 ASSESSMENT — COGNITIVE AND FUNCTIONAL STATUS - GENERAL
TURNING FROM BACK TO SIDE WHILE IN FLAT BAD: A LOT
DAILY ACTIVITIY SCORE: 8
PERSONAL GROOMING: TOTAL
STANDING UP FROM CHAIR USING ARMS: A LOT
MOBILITY SCORE: 12
EATING MEALS: TOTAL
DRESSING REGULAR UPPER BODY CLOTHING: TOTAL
TOILETING: TOTAL
DRESSING REGULAR LOWER BODY CLOTHING: A LOT
WALKING IN HOSPITAL ROOM: TOTAL
MOVING TO AND FROM BED TO CHAIR: A LOT
HELP NEEDED FOR BATHING: A LOT
CLIMB 3 TO 5 STEPS WITH RAILING: TOTAL

## 2024-12-03 ASSESSMENT — PAIN SCALES - GENERAL
PAINLEVEL_OUTOF10: 0 - NO PAIN
PAINLEVEL_OUTOF10: 0 - NO PAIN

## 2024-12-03 ASSESSMENT — PAIN SCALES - WONG BAKER: WONGBAKER_NUMERICALRESPONSE: NO HURT

## 2024-12-03 NOTE — CARE PLAN
The patient's goals for the shift include Labs WNL    The clinical goals for the shift include Patient to remain HDS throughout shift      Problem: Skin  Goal: Decreased wound size/increased tissue granulation at next dressing change  Outcome: Progressing  Goal: Participates in plan/prevention/treatment measures  Outcome: Progressing  Goal: Prevent/manage excess moisture  Outcome: Progressing  Goal: Prevent/minimize sheer/friction injuries  Outcome: Progressing  Goal: Promote/optimize nutrition  Outcome: Progressing  Goal: Promote skin healing  Outcome: Progressing     Problem: Fall/Injury  Goal: Verbalize understanding of personal risk factors for fall in the hospital  Outcome: Progressing  Goal: Verbalize understanding of risk factor reduction measures to prevent injury from fall in the home  Outcome: Progressing  Goal: Use assistive devices by end of the shift  Outcome: Progressing  Goal: Pace activities to prevent fatigue by end of the shift  Outcome: Progressing  Goal: Not fall by end of shift  Outcome: Progressing  Goal: Be free from injury by end of the shift  Outcome: Progressing

## 2024-12-03 NOTE — PROGRESS NOTES
"Carolina Chowdhury is a 39 y.o. female on day 15 of admission presenting with increased secretions from group home      Subjective     Seen and examined   Has thick green secretions from trach   No fever   Alert and awake   Follows commands      Objective     Last Recorded Vitals  /77   Pulse 73   Temp 36.7 °C (98.1 °F)   Resp 17   Ht 1.575 m (5' 2\")   Wt 48.6 kg (107 lb 2.3 oz)   SpO2 96%   BMI 19.60 kg/m²      Intake/Output last 3 Shifts:    Intake/Output Summary (Last 24 hours) at 12/3/2024 1132  Last data filed at 12/2/2024 7617  Gross per 24 hour   Intake 150 ml   Output --   Net 150 ml           Scheduled medications  budesonide, 0.5 mg, nebulization, BID  enoxaparin, 40 mg, subcutaneous, q24h  famotidine, 20 mg, oral, BID  FLUoxetine, 40 mg, oral, BID  glycopyrrolate, 2 mg, oral, TID  guaiFENesin, 200 mg, oral, BID  hydrOXYzine HCL, 50 mg, oral, BID  ipratropium-albuteroL, 3 mL, nebulization, TID  levETIRAcetam, 500 mg, g-tube, BID  lubricating eye drops, 2 drop, Both Eyes, BID  metoclopramide, 10 mg, oral, TID  metoprolol succinate XL, 12.5 mg, oral, Daily  montelukast, 10 mg, oral, Nightly  OLANZapine, 2.5 mg, oral, BID  oxygen, , inhalation, Continuous - Inhalation  pantoprazole, 40 mg, oral, BID AC  polyethylene glycol, 17 g, oral, Daily  prazosin, 2 mg, oral, Nightly  traZODone, 100 mg, oral, Nightly      Continuous medications       PRN medications  PRN medications: acetaminophen **OR** acetaminophen    Physical Exam   General: Ill-appearing adult female in mild distress   HEENT: Clear sclera, EOMI, trachea midline, moist mucous membranes, trach in place  Extremities: No cyanosis or clubbing appreciated  Neurological: Spontaneously moves all extremities, awake, alert  Psychiatric: Appropriate mood and affect  Skin: Warm, dry    Relevant Results  Lab Results   Component Value Date    WBC 7.4 11/19/2024    HGB 13.1 11/19/2024    HCT 40.1 11/19/2024    MCV 96 11/19/2024     11/19/2024 "     Lab Results   Component Value Date    GLUCOSE 69 (L) 11/19/2024    CALCIUM 8.4 (L) 11/19/2024     11/19/2024    K 3.9 11/19/2024    CO2 26 11/19/2024     (H) 11/19/2024    BUN 8 11/19/2024    CREATININE 0.62 11/19/2024         Assessment/Plan     39 year old female with history of MRDD admitted from group home for increased secretions     Chronic respiratory failure s/p post tracheostomy  Seen and examined   Clinically stable   Alert and awake   Had thick green secretions   No fever  CBC now   Sputum culture   Follows commands   Chest xray       Seizure disorder  No more seizures  Seizure precaution   Keppra p.o. twice daily 500 mg     S/p PEG tube on TF    Intellectually challenging    Anxiety continue with the Prozac    Social work following for placement    DVT ppx: lovenox    DC planning:   Dispo to LTC pending state clearance.

## 2024-12-03 NOTE — PROGRESS NOTES
12/03/24 0921   Discharge Planning   Expected Discharge Disposition Inter  ([facility])     Clinical updates sent to the facility. Still waiting Level 2 review results from [facility].

## 2024-12-04 PROCEDURE — 2500000001 HC RX 250 WO HCPCS SELF ADMINISTERED DRUGS (ALT 637 FOR MEDICARE OP): Performed by: HOSPITALIST

## 2024-12-04 PROCEDURE — 99232 SBSQ HOSP IP/OBS MODERATE 35: CPT | Performed by: HOSPITALIST

## 2024-12-04 PROCEDURE — 2500000004 HC RX 250 GENERAL PHARMACY W/ HCPCS (ALT 636 FOR OP/ED): Performed by: INTERNAL MEDICINE

## 2024-12-04 PROCEDURE — 2500000005 HC RX 250 GENERAL PHARMACY W/O HCPCS: Performed by: HOSPITALIST

## 2024-12-04 PROCEDURE — 1210000001 HC SEMI-PRIVATE ROOM DAILY

## 2024-12-04 PROCEDURE — 94640 AIRWAY INHALATION TREATMENT: CPT

## 2024-12-04 PROCEDURE — 2500000002 HC RX 250 W HCPCS SELF ADMINISTERED DRUGS (ALT 637 FOR MEDICARE OP, ALT 636 FOR OP/ED): Performed by: HOSPITALIST

## 2024-12-04 PROCEDURE — 2500000001 HC RX 250 WO HCPCS SELF ADMINISTERED DRUGS (ALT 637 FOR MEDICARE OP): Performed by: STUDENT IN AN ORGANIZED HEALTH CARE EDUCATION/TRAINING PROGRAM

## 2024-12-04 PROCEDURE — 2500000001 HC RX 250 WO HCPCS SELF ADMINISTERED DRUGS (ALT 637 FOR MEDICARE OP): Performed by: INTERNAL MEDICINE

## 2024-12-04 RX ORDER — LEVOFLOXACIN 250 MG/1
500 TABLET ORAL EVERY 24 HOURS
Status: DISCONTINUED | OUTPATIENT
Start: 2024-12-04 | End: 2024-12-06 | Stop reason: HOSPADM

## 2024-12-04 RX ORDER — LEVOFLOXACIN 5 MG/ML
500 INJECTION, SOLUTION INTRAVENOUS EVERY 24 HOURS
Status: DISCONTINUED | OUTPATIENT
Start: 2024-12-04 | End: 2024-12-04

## 2024-12-04 ASSESSMENT — COGNITIVE AND FUNCTIONAL STATUS - GENERAL
PERSONAL GROOMING: TOTAL
WALKING IN HOSPITAL ROOM: TOTAL
STANDING UP FROM CHAIR USING ARMS: TOTAL
MOVING TO AND FROM BED TO CHAIR: A LOT
CLIMB 3 TO 5 STEPS WITH RAILING: TOTAL
MOVING TO AND FROM BED TO CHAIR: A LOT
MOBILITY SCORE: 11
DAILY ACTIVITIY SCORE: 8
PERSONAL GROOMING: TOTAL
EATING MEALS: TOTAL
EATING MEALS: TOTAL
DRESSING REGULAR LOWER BODY CLOTHING: A LOT
DRESSING REGULAR LOWER BODY CLOTHING: A LOT
TOILETING: TOTAL
STANDING UP FROM CHAIR USING ARMS: TOTAL
TURNING FROM BACK TO SIDE WHILE IN FLAT BAD: A LOT
HELP NEEDED FOR BATHING: A LOT
DRESSING REGULAR UPPER BODY CLOTHING: TOTAL
TURNING FROM BACK TO SIDE WHILE IN FLAT BAD: A LOT
MOBILITY SCORE: 11
WALKING IN HOSPITAL ROOM: TOTAL
CLIMB 3 TO 5 STEPS WITH RAILING: TOTAL
TOILETING: TOTAL
DAILY ACTIVITIY SCORE: 8
DRESSING REGULAR UPPER BODY CLOTHING: TOTAL
HELP NEEDED FOR BATHING: A LOT

## 2024-12-04 ASSESSMENT — PAIN SCALES - GENERAL
PAINLEVEL_OUTOF10: 0 - NO PAIN
PAINLEVEL_OUTOF10: 0 - NO PAIN

## 2024-12-04 ASSESSMENT — PAIN - FUNCTIONAL ASSESSMENT
PAIN_FUNCTIONAL_ASSESSMENT: 0-10
PAIN_FUNCTIONAL_ASSESSMENT: 0-10

## 2024-12-04 NOTE — PROGRESS NOTES
Pt. Is complex dc due to physical needs and recent behavior in nursing home.   Over [facility] referrals have been sent out and awaiting responses from     Addendum from previous Social work note:  5933 Update- Level II review completed. [facility] notified and updated that they will no longer accept pt due to hx of behavior concerns as reported to them by previous group home. Hospital not notified of behavioral hx. SW sent referrals to several other SNF facilities in this area as well as surrounding areas. Called brother Afshin to update, unable to reach,  left and provided SW contact information.

## 2024-12-04 NOTE — PROGRESS NOTES
"Nutrition Follow Up Assessment:   Nutrition Assessment         Patient is a 39 y.o. female presenting with tracheostomy care      Nutrition History:  Energy Intake: Good > 75 %  Food and Nutrient History: RDN follow up. No changes in condition, pending discharge. Pt requesting pop and ice cream when meeting with this RD. Will continue to monitor.  Food Allergies/Intolerances:  eggs, apple, oat, milk, strawberries, orange juice. Question accuracy of oat, milk, and apple allergies as a RD has asked at a previous admission and pt denied being allergic to these. Appears to have tolerated Osmolite 1.5 this admission. Of note, apparently receives Nutren outpatient, which would also contain milk.   GI Symptoms: None  Oral Problems: Swallowing difficulty       Anthropometrics:  Height: 157.5 cm (5' 2\")   Weight: 48.6 kg (107 lb 2.3 oz)   BMI (Calculated): 19.59  IBW/kg (Dietitian Calculated): 50 kg          Weight History:   Wt Readings from Last 10 Encounters:   11/27/24 48.6 kg (107 lb 2.3 oz)   06/26/24 49.9 kg (110 lb)   06/05/24 49.9 kg (110 lb)   06/03/24 (!) 37 kg (81 lb 9.1 oz)   04/27/24 (!) 36.3 kg (80 lb)   03/15/24 (!) 40 kg (88 lb 2.9 oz)   10/22/21 36.1 kg (79 lb 9.6 oz)   08/27/21 37.6 kg (83 lb)   07/31/21 (!) 43 kg (94 lb 12.8 oz)      Weight Change %:  Weight History / % Weight Change: no new wt since last assessment    Nutrition Focused Physical Exam Findings:  defer: see 11/27 note  Subcutaneous Fat Loss:      Muscle Wasting:     Edema:  Edema: none  Physical Findings:  Skin: Negative    Nutrition Significant Labs:  BMP Trend:    , A1C:No results found for: \"HGBA1C\", BG POCT trend:        Nutrition Specific Medications:  Scheduled medications  guaiFENesin, 200 mg, oral, BID  pantoprazole, 40 mg, oral, BID AC  polyethylene glycol, 17 g, oral, Daily    I/O:   Last BM Date: 12/03/24; Stool Appearance: Soft (12/01/24 0706)    Dietary Orders (From admission, onward)       Start     Ordered    11/19/24 1946  " Enteral feeding WITH diet order Diet type: Regular; Texture: Pureed 4; Fluid consistency: Thin 0; Tube feeding formula: Osmolite 1.5; 240; nightly  Continuous        Question Answer Comment   Diet type Regular    Texture Pureed 4    Fluid consistency Thin 0    Tube feeding formula: Osmolite 1.5    Tube feeding bolus (mL): 240    Tube feeding bolus frequency: nightly        11/19/24 1945 11/17/24 0827  May Not Participate in Room Service  ( ROOM SERVICE MAY NOT PARTICIPATE)  Once        Question:  .  Answer:  Yes    11/17/24 0826                     Estimated Needs:   Total Energy Estimated Needs (kCal): 1470 kCal  Method for Estimating Needs: 30 kcal/kg  Total Protein Estimated Needs (g): 59 g  Method for Estimating Needs: 1.2 g/kg  Total Fluid Estimated Needs (mL): 1470 mL  Method for Estimating Needs: 1 ml/kcal or per MD        Nutrition Diagnosis   Malnutrition Diagnosis  Patient has Malnutrition Diagnosis: No    Nutrition Diagnosis  Patient has Nutrition Diagnosis: Yes  Diagnosis Status (1): Ongoing  Nutrition Diagnosis 1: Swallowing difficulity  Related to (1): h/o MRDD and CP s/p trach and PEG  As Evidenced by (1): SLP recommending pureed/thin liquids, need for supplemental TF       Nutrition Interventions/Recommendations         Nutrition Prescription:  Individualized Nutrition Prescription Provided for : Continue current diet regimen        Nutrition Interventions:   Interventions: Meals and snacks, Enteral intake  Meals and Snacks: General healthful diet, Texture-modified diet  Goal: Consumes 3 meals per day  Enteral Intake: Enteral nutrition site care, Modify concentration of enteral nutrition, Modify rate of enteral nutrition  Goal: 240 ml bolus via PEG tube of Osmolite 1.5 (provides 360 kcal, 15 g protein and 183 ml free water). 50 ml water flush before and after bolus feed    Collaboration and Referral of Nutrition Care: Collaboration by nutrition professional with other providers  Goal:  RN    Nutrition Education:   No needs       Nutrition Monitoring and Evaluation   Food/Nutrient Related History Monitoring  Monitoring and Evaluation Plan: Energy intake, Amount of food, Fluid intake  Energy Intake: Estimated energy intake  Criteria: Pt meets >75% of estimated energy needs - met  Fluid Intake: Estimated fluid intake  Criteria: Meets >75% of estimated fluid needs - met  Amount of Food: Estimated amout of food, Medical food intake  Criteria: Pt consumes >75% of meals and supplements - met  Enteral and Parenteral Nutrition Intake: Enteral nutrition intake  Criteria: tolerate TF bolus - met    Body Composition/Growth/Weight History  Monitoring and Evaluation Plan: Weight  Weight: Measured weight  Criteria: Maintains stable weight - unable to assess    Biochemical Data, Medical Tests and Procedures  Monitoring and Evaluation Plan: Glucose/endocrine profile, Electrolyte/renal panel  Electrolyte and Renal Panel: Sodium, Potassium, Phosphorus  Criteria: Electrolytes WNL - no recent labs  Glucose/Endocrine Profile: Glucose, casual  Criteria: BG within desirable range - no recent labs              Time Spent (min): 30 minutes

## 2024-12-04 NOTE — PROGRESS NOTES
Patient remains off 1:1. [facility] still willing to accept patient, however still waiting on level II PASSR from state. Patient can transfer once authorization obtained.

## 2024-12-04 NOTE — CARE PLAN
The patient's goals for the shift include NA    The clinical goals for the shift include Safety  Problem: Skin  Goal: Decreased wound size/increased tissue granulation at next dressing change  Outcome: Progressing  Goal: Participates in plan/prevention/treatment measures  Outcome: Progressing  Flowsheets (Taken 12/4/2024 0837)  Participates in plan/prevention/treatment measures:   Discuss with provider PT/OT consult   Elevate heels  Goal: Prevent/manage excess moisture  Outcome: Progressing  Flowsheets (Taken 12/4/2024 0837)  Prevent/manage excess moisture:   Cleanse incontinence/protect with barrier cream   Moisturize dry skin  Goal: Prevent/minimize sheer/friction injuries  Outcome: Progressing  Goal: Promote/optimize nutrition  Outcome: Progressing  Flowsheets (Taken 12/4/2024 0837)  Promote/optimize nutrition:   Assist with feeding   Consume > 50% meals/supplements   Monitor/record intake including meals  Goal: Promote skin healing  Outcome: Progressing     Problem: Fall/Injury  Goal: Verbalize understanding of personal risk factors for fall in the hospital  Outcome: Progressing  Goal: Verbalize understanding of risk factor reduction measures to prevent injury from fall in the home  Outcome: Progressing  Goal: Use assistive devices by end of the shift  Outcome: Progressing  Goal: Pace activities to prevent fatigue by end of the shift  Outcome: Progressing  Goal: Not fall by end of shift  Outcome: Progressing  Goal: Be free from injury by end of the shift  Outcome: Progressing     Problem: Nutrition  Goal: Oral intake greater 75%  Outcome: Progressing  Goal: Adequate PO fluid intake  Outcome: Progressing  Goal: Nutrition support is meeting 75% of nutrient needs  Outcome: Progressing  Goal: Tube feed tolerance  Outcome: Progressing  Goal: BG  mg/dL  Outcome: Progressing  Goal: Electrolytes WNL  Outcome: Progressing  Goal: Maintain stable weight  Outcome: Progressing     Problem: Pain - Adult  Goal:  Verbalizes/displays adequate comfort level or baseline comfort level  Outcome: Progressing     Problem: Safety - Adult  Goal: Free from fall injury  Outcome: Progressing     Problem: Discharge Planning  Goal: Discharge to home or other facility with appropriate resources  Outcome: Progressing     Problem: Chronic Conditions and Co-morbidities  Goal: Patient's chronic conditions and co-morbidity symptoms are monitored and maintained or improved  Outcome: Progressing     Problem: Pain  Goal: Takes deep breaths with improved pain control throughout the shift  Outcome: Progressing  Goal: Turns in bed with improved pain control throughout the shift  Outcome: Progressing  Goal: Walks with improved pain control throughout the shift  Outcome: Progressing  Goal: Performs ADL's with improved pain control throughout shift  Outcome: Progressing  Goal: Participates in PT with improved pain control throughout the shift  Outcome: Progressing  Goal: Free from opioid side effects throughout the shift  Outcome: Progressing  Goal: Free from acute confusion related to pain meds throughout the shift  Outcome: Progressing     Problem: Respiratory  Goal: Clear secretions with interventions this shift  Outcome: Progressing  Goal: Minimize anxiety/maximize coping throughout shift  Outcome: Progressing  Goal: Minimal/no exertional discomfort or dyspnea this shift  Outcome: Progressing  Goal: No signs of respiratory distress (eg. Use of accessory muscles. Peds grunting)  Outcome: Progressing  Goal: Patent airway maintained this shift  Outcome: Progressing  Goal: Tolerate mechanical ventilation evidenced by VS/agitation level this shift  Outcome: Progressing  Goal: Tolerate pulmonary toileting this shift  Outcome: Progressing  Goal: Verbalize decreased shortness of breath this shift  Outcome: Progressing  Goal: Wean oxygen to maintain O2 saturation per order/standard this shift  Outcome: Progressing  Goal: Increase self care and/or family  involvement in next 24 hours  Outcome: Progressing

## 2024-12-04 NOTE — PROGRESS NOTES
"Email response and call received this AM from Nancy Haque at [facility] regarding level II review for NF placement. Per Nancy,   \"We are converting the PASRR to respite to allow the NF to complete evaluation and treatment where necessary. This will also give the [facility] time to search for a least restrictive alternative placement as required by rule. Once admitted to the , the NF will have to submit a resident review.\"   In speaking with Nancy, she explained that respite stay at  will be extended on a bi-weekly basis as needed to allow [facility] adequate time to seek the most appropriate placement for pt. ROCKY sent email message to [facility] SSD, Kimberly Alvarez updating her on [facility] response.  also notified. Waiting on determination letter to be loaded into [facility] system before proceeding with discharge.     1730 Update- Level II review completed. [facility] notified and updated that they will no longer accept pt due to hx of behavior concerns as reported to them by previous group home. Hospital not notified of behavioral hx. ROCKY sent referrals to several other SNF facilities in this area as well as surrounding areas. Called brother Afshin to update, unable to reach,  left and provided  contact information.   "

## 2024-12-04 NOTE — PROGRESS NOTES
"Carolina Chowdhury is a 39 y.o. female on day 16 of admission presenting with increased secretions from group home      Subjective     Seen and examined   No fever   Alert and awake   Follows commands      Objective     Last Recorded Vitals  /86 (BP Location: Left arm, Patient Position: Lying)   Pulse 89   Temp 36.2 °C (97.2 °F) (Temporal)   Resp 18   Ht 1.575 m (5' 2\")   Wt 48.6 kg (107 lb 2.3 oz)   SpO2 97%   BMI 19.60 kg/m²      Intake/Output last 3 Shifts:    Intake/Output Summary (Last 24 hours) at 12/4/2024 1143  Last data filed at 12/4/2024 0757  Gross per 24 hour   Intake 740 ml   Output --   Net 740 ml           Scheduled medications  budesonide, 0.5 mg, nebulization, BID  enoxaparin, 40 mg, subcutaneous, q24h  famotidine, 20 mg, oral, BID  FLUoxetine, 40 mg, oral, BID  glycopyrrolate, 2 mg, oral, TID  guaiFENesin, 200 mg, oral, BID  hydrOXYzine HCL, 50 mg, oral, BID  ipratropium-albuteroL, 3 mL, nebulization, TID  levETIRAcetam, 500 mg, g-tube, BID  lubricating eye drops, 2 drop, Both Eyes, BID  metoclopramide, 10 mg, oral, TID  metoprolol succinate XL, 12.5 mg, oral, Daily  montelukast, 10 mg, oral, Nightly  OLANZapine, 2.5 mg, oral, BID  oxygen, , inhalation, Continuous - Inhalation  pantoprazole, 40 mg, oral, BID AC  polyethylene glycol, 17 g, oral, Daily  prazosin, 2 mg, oral, Nightly  traZODone, 100 mg, oral, Nightly      Continuous medications       PRN medications  PRN medications: acetaminophen **OR** acetaminophen    Physical Exam   General: Ill-appearing adult female in mild distress   HEENT: Clear sclera, EOMI, trachea midline, moist mucous membranes, trach in place  Extremities: No cyanosis or clubbing appreciated  Neurological: Spontaneously moves all extremities, awake, alert  Psychiatric: Appropriate mood and affect  Skin: Warm, dry    Relevant Results  Lab Results   Component Value Date    WBC 12.9 (H) 12/03/2024    HGB 13.3 12/03/2024    HCT 40.4 12/03/2024    MCV 95 12/03/2024    "  12/03/2024     Lab Results   Component Value Date    GLUCOSE 69 (L) 11/19/2024    CALCIUM 8.4 (L) 11/19/2024     11/19/2024    K 3.9 11/19/2024    CO2 26 11/19/2024     (H) 11/19/2024    BUN 8 11/19/2024    CREATININE 0.62 11/19/2024         Assessment/Plan     39 year old female with history of MRDD admitted from group home for increased secretions     Chronic respiratory failure s/p post tracheostomy  Seen and examined   Clinically stable   Alert and awake   Had thick green secretions   No fever  CBC is 12.9   Sputum culture is pending   Levaquin IV daily   Follows commands   Chest xray was unremarkable       Seizure disorder  No more seizures  Seizure precaution   Keppra p.o. twice daily 500 mg     S/p PEG tube on TF    Intellectually challenging    Anxiety continue with the Prozac    Social work following for placement    DVT ppx: lovenox    DC planning:   Dispo to LTC pending state clearance. Waiting for approval from the state

## 2024-12-05 PROCEDURE — 2500000002 HC RX 250 W HCPCS SELF ADMINISTERED DRUGS (ALT 637 FOR MEDICARE OP, ALT 636 FOR OP/ED): Performed by: HOSPITALIST

## 2024-12-05 PROCEDURE — 2500000001 HC RX 250 WO HCPCS SELF ADMINISTERED DRUGS (ALT 637 FOR MEDICARE OP): Performed by: HOSPITALIST

## 2024-12-05 PROCEDURE — 2500000001 HC RX 250 WO HCPCS SELF ADMINISTERED DRUGS (ALT 637 FOR MEDICARE OP): Performed by: INTERNAL MEDICINE

## 2024-12-05 PROCEDURE — 1210000001 HC SEMI-PRIVATE ROOM DAILY

## 2024-12-05 PROCEDURE — 99239 HOSP IP/OBS DSCHRG MGMT >30: CPT | Performed by: HOSPITALIST

## 2024-12-05 PROCEDURE — 2500000001 HC RX 250 WO HCPCS SELF ADMINISTERED DRUGS (ALT 637 FOR MEDICARE OP): Performed by: STUDENT IN AN ORGANIZED HEALTH CARE EDUCATION/TRAINING PROGRAM

## 2024-12-05 PROCEDURE — 94640 AIRWAY INHALATION TREATMENT: CPT

## 2024-12-05 PROCEDURE — 94760 N-INVAS EAR/PLS OXIMETRY 1: CPT

## 2024-12-05 PROCEDURE — 2500000005 HC RX 250 GENERAL PHARMACY W/O HCPCS: Performed by: HOSPITALIST

## 2024-12-05 RX ORDER — LEVOFLOXACIN 500 MG/1
500 TABLET, FILM COATED ORAL EVERY 24 HOURS
Qty: 7 TABLET | Refills: 0 | Status: SHIPPED | OUTPATIENT
Start: 2024-12-05 | End: 2024-12-12

## 2024-12-05 ASSESSMENT — COGNITIVE AND FUNCTIONAL STATUS - GENERAL
DRESSING REGULAR UPPER BODY CLOTHING: TOTAL
EATING MEALS: TOTAL
HELP NEEDED FOR BATHING: A LOT
WALKING IN HOSPITAL ROOM: TOTAL
EATING MEALS: TOTAL
PERSONAL GROOMING: TOTAL
EATING MEALS: TOTAL
DRESSING REGULAR UPPER BODY CLOTHING: TOTAL
DRESSING REGULAR UPPER BODY CLOTHING: TOTAL
CLIMB 3 TO 5 STEPS WITH RAILING: TOTAL
DRESSING REGULAR LOWER BODY CLOTHING: A LOT
HELP NEEDED FOR BATHING: A LOT
DAILY ACTIVITIY SCORE: 8
CLIMB 3 TO 5 STEPS WITH RAILING: TOTAL
DRESSING REGULAR LOWER BODY CLOTHING: A LOT
DRESSING REGULAR UPPER BODY CLOTHING: TOTAL
DAILY ACTIVITIY SCORE: 8
DAILY ACTIVITIY SCORE: 8
WALKING IN HOSPITAL ROOM: TOTAL
STANDING UP FROM CHAIR USING ARMS: TOTAL
HELP NEEDED FOR BATHING: A LOT
TURNING FROM BACK TO SIDE WHILE IN FLAT BAD: A LOT
EATING MEALS: TOTAL
PERSONAL GROOMING: TOTAL
MOVING TO AND FROM BED TO CHAIR: A LOT
STANDING UP FROM CHAIR USING ARMS: TOTAL
HELP NEEDED FOR BATHING: A LOT
TOILETING: TOTAL
WALKING IN HOSPITAL ROOM: TOTAL
STANDING UP FROM CHAIR USING ARMS: TOTAL
MOBILITY SCORE: 11
MOVING TO AND FROM BED TO CHAIR: A LOT
MOBILITY SCORE: 11
MOVING TO AND FROM BED TO CHAIR: A LOT
TOILETING: TOTAL
PERSONAL GROOMING: TOTAL
TOILETING: TOTAL
PERSONAL GROOMING: TOTAL
CLIMB 3 TO 5 STEPS WITH RAILING: TOTAL
MOBILITY SCORE: 11
DRESSING REGULAR LOWER BODY CLOTHING: A LOT
TURNING FROM BACK TO SIDE WHILE IN FLAT BAD: A LOT
TOILETING: TOTAL
TURNING FROM BACK TO SIDE WHILE IN FLAT BAD: A LOT

## 2024-12-05 ASSESSMENT — PAIN SCALES - WONG BAKER
WONGBAKER_NUMERICALRESPONSE: NO HURT
WONGBAKER_NUMERICALRESPONSE: HURTS LITTLE BIT

## 2024-12-05 ASSESSMENT — PAIN SCALES - GENERAL
PAINLEVEL_OUTOF10: 3
PAINLEVEL_OUTOF10: 5 - MODERATE PAIN
PAINLEVEL_OUTOF10: 0 - NO PAIN

## 2024-12-05 ASSESSMENT — PAIN DESCRIPTION - LOCATION: LOCATION: JAW

## 2024-12-05 NOTE — DISCHARGE SUMMARY
Discharge Diagnosis  Tracheostomy care (Multi)    Issues Requiring Follow-Up  None     Discharge Meds     Medication List      START taking these medications     ipratropium-albuteroL 0.5-2.5 mg/3 mL nebulizer solution; Commonly known   as: Duo-Neb; Take 3 mL by nebulization 3 times a day.   levETIRAcetam 100 mg/mL solution; Commonly known as: Keppra; 5 mL (500   mg) by g-tube route 2 times a day.   levoFLOXacin 500 mg tablet; Commonly known as: Levaquin; Take 1 tablet   (500 mg) by mouth once every 24 hours for 7 days.   oxygen gas therapy; Commonly known as: O2; Inhale 1 each every 12 hours.     CONTINUE taking these medications     albuterol 2.5 mg /3 mL (0.083 %) nebulizer solution   budesonide 0.5 mg/2 mL nebulizer solution; Commonly known as: Pulmicort   calcium citrate-vitamin D3 315 mg-5 mcg (200 unit) tablet; Commonly   known as: Citracal+D   cimetidine 400 mg tablet; Commonly known as: Tagamet   Flonase Sensimist 27.5 mcg/actuation nasal spray; Generic drug:   fluticasone   FLUoxetine 40 mg capsule; Commonly known as: PROzac   glycopyrrolate 1 mg tablet; Commonly known as: Robinul   guaiFENesin 100 mg/5 mL syrup; Commonly known as: Robitussin   hydrOXYzine HCL 50 mg tablet; Commonly known as: Atarax   lubricating eye drops ophthalmic solution   metoclopramide 10 mg tablet; Commonly known as: Reglan   metoprolol succinate XL 25 mg 24 hr tablet; Commonly known as: Toprol-XL   montelukast 10 mg tablet; Commonly known as: Singulair   multivitamin with iron - children's chewable tablet; Commonly known as:   Cerovite, Jr   OLANZapine 2.5 mg tablet; Commonly known as: ZyPREXA   omeprazole OTC 20 mg EC tablet; Commonly known as: PriLOSEC OTC   polyethylene glycol 8.5 gram powder in packet; Commonly known as:   Glycolax, Miralax; Take 17 g by mouth once daily. Substitute gastrostomy   for PO   potassium chloride 20 mEq/15 mL liquid   prazosin 2 mg capsule; Commonly known as: Minipress   traZODone 100 mg tablet;  Commonly known as: Desyrel       Test Results Pending At Discharge  Pending Labs       Order Current Status    Respiratory Culture/Smear Preliminary result            Hospital Course   39 year old female with history of MRDD admitted from group home for increased secretions      Chronic respiratory failure s/p post tracheostomy  Seen and examined   Clinically stable   Alert and awake   Had thick green secretions   No fever  CBC is 12.9   Sputum culture is pending   Levaquin IV daily   Follows commands   Chest xray was unremarkable         Seizure disorder  No more seizures  Seizure precaution   Keppra p.o. twice daily 500 mg      S/p PEG tube on TF     Intellectually challenging     [facility]     Social work following for placement     DVT ppx: lovenox     DC planning:   Dispo to LTC pending state clearance. Waiting for approval from the state       Pertinent Physical Exam At Time of Discharge  Physical Exam  General: Ill-appearing adult female in mild distress   HEENT: Clear sclera, EOMI, trachea midline, moist mucous membranes, trach in place  Extremities: No cyanosis or clubbing appreciated  Neurological: Spontaneously moves all extremities, awake, alert  Psychiatric: Appropriate mood and affect  Skin: Warm, dry      Outpatient Follow-Up  No future appointments.    Discharge time >30 min     Floyd Decker MD

## 2024-12-05 NOTE — CARE PLAN
The patient's goals for the shift include Labs WNL    The clinical goals for the shift include safety throughout shift

## 2024-12-05 NOTE — CARE PLAN
The patient's goals for the shift include Labs WNL    The clinical goals for the shift include tolerate peg tube feed    Problem: Skin  Goal: Decreased wound size/increased tissue granulation at next dressing change  Outcome: Progressing  Goal: Participates in plan/prevention/treatment measures  Outcome: Progressing  Goal: Prevent/manage excess moisture  Outcome: Progressing  Goal: Prevent/minimize sheer/friction injuries  Outcome: Progressing  Goal: Promote/optimize nutrition  Outcome: Progressing  Goal: Promote skin healing  Outcome: Progressing     Problem: Fall/Injury  Goal: Verbalize understanding of personal risk factors for fall in the hospital  Outcome: Progressing  Goal: Verbalize understanding of risk factor reduction measures to prevent injury from fall in the home  Outcome: Progressing  Goal: Use assistive devices by end of the shift  Outcome: Progressing  Goal: Pace activities to prevent fatigue by end of the shift  Outcome: Progressing  Goal: Not fall by end of shift  Outcome: Progressing  Goal: Be free from injury by end of the shift  Outcome: Progressing     Problem: Nutrition  Goal: Oral intake greater 75%  Outcome: Progressing  Goal: Adequate PO fluid intake  Outcome: Progressing  Goal: Nutrition support is meeting 75% of nutrient needs  Outcome: Progressing  Goal: Tube feed tolerance  Outcome: Progressing  Goal: BG  mg/dL  Outcome: Progressing  Goal: Electrolytes WNL  Outcome: Progressing  Goal: Maintain stable weight  Outcome: Progressing     Problem: Pain - Adult  Goal: Verbalizes/displays adequate comfort level or baseline comfort level  Outcome: Progressing     Problem: Safety - Adult  Goal: Free from fall injury  Outcome: Progressing     Problem: Discharge Planning  Goal: Discharge to home or other facility with appropriate resources  Outcome: Progressing     Problem: Chronic Conditions and Co-morbidities  Goal: Patient's chronic conditions and co-morbidity symptoms are monitored and  maintained or improved  Outcome: Progressing     Problem: Pain  Goal: Takes deep breaths with improved pain control throughout the shift  Outcome: Progressing  Goal: Turns in bed with improved pain control throughout the shift  Outcome: Progressing  Goal: Walks with improved pain control throughout the shift  Outcome: Progressing  Goal: Performs ADL's with improved pain control throughout shift  Outcome: Progressing  Goal: Participates in PT with improved pain control throughout the shift  Outcome: Progressing  Goal: Free from opioid side effects throughout the shift  Outcome: Progressing  Goal: Free from acute confusion related to pain meds throughout the shift  Outcome: Progressing     Problem: Respiratory  Goal: Clear secretions with interventions this shift  Outcome: Progressing  Goal: Minimize anxiety/maximize coping throughout shift  Outcome: Progressing  Goal: Minimal/no exertional discomfort or dyspnea this shift  Outcome: Progressing  Goal: No signs of respiratory distress (eg. Use of accessory muscles. Peds grunting)  Outcome: Progressing  Goal: Patent airway maintained this shift  Outcome: Progressing  Goal: Tolerate mechanical ventilation evidenced by VS/agitation level this shift  Outcome: Progressing  Goal: Tolerate pulmonary toileting this shift  Outcome: Progressing  Goal: Verbalize decreased shortness of breath this shift  Outcome: Progressing  Goal: Wean oxygen to maintain O2 saturation per order/standard this shift  Outcome: Progressing  Goal: Increase self care and/or family involvement in next 24 hours  Outcome: Progressing

## 2024-12-05 NOTE — PROGRESS NOTES
12/05/24 1455   Discharge Planning   Home or Post Acute Services Post acute facilities (Rehab/SNF/etc)   Type of Post Acute Facility Services Long term care   Expected Discharge Disposition Inter  ([facility])   Does the patient need discharge transport arranged? Yes   RoundTrip coordination needed? Yes   Has discharge transport been arranged? Yes   What day is the transport expected? 12/06/24   What time is the transport expected? 1000     [facility] is able to accommodate pt, room will be available tomorrow 12/6. Transport scheduled for tomorrow morning at 10am. Spoke with brother Afshin and provided update, Afshin in agreement with facility and discharge plan.

## 2024-12-05 NOTE — CARE PLAN
The patient's goals for the shift include Labs WNL    The clinical goals for the shift include Safety    Problem: Skin  Goal: Decreased wound size/increased tissue granulation at next dressing change  12/5/2024 1315 by Olga Maguire RN  Outcome: Progressing  Flowsheets (Taken 12/5/2024 1315)  Decreased wound size/increased tissue granulation at next dressing change: Promote sleep for wound healing  12/5/2024 1315 by Olga Maguire RN  Outcome: Progressing  Flowsheets (Taken 12/5/2024 1315)  Decreased wound size/increased tissue granulation at next dressing change: Promote sleep for wound healing  Goal: Participates in plan/prevention/treatment measures  12/5/2024 1315 by Olga Maguire RN  Outcome: Progressing  12/5/2024 1315 by Olag Maguire RN  Outcome: Progressing  Goal: Prevent/manage excess moisture  12/5/2024 1315 by Olga Maguire RN  Outcome: Progressing  Flowsheets (Taken 12/5/2024 1315)  Prevent/manage excess moisture: Moisturize dry skin  12/5/2024 1315 by Olga Maguire RN  Outcome: Progressing  Flowsheets (Taken 12/5/2024 1315)  Prevent/manage excess moisture: Moisturize dry skin  Goal: Prevent/minimize sheer/friction injuries  12/5/2024 1315 by Olga Maguire RN  Outcome: Progressing  12/5/2024 1315 by Olga Maguire RN  Outcome: Progressing  Goal: Promote/optimize nutrition  12/5/2024 1315 by Olga Maguire RN  Outcome: Progressing  12/5/2024 1315 by Olga Maguire RN  Outcome: Progressing  Goal: Promote skin healing  12/5/2024 1315 by Olga Maguire RN  Outcome: Progressing  12/5/2024 1315 by Olga Maguire RN  Outcome: Progressing     Problem: Fall/Injury  Goal: Verbalize understanding of personal risk factors for fall in the hospital  12/5/2024 1315 by Olga Maguire RN  Outcome: Progressing  12/5/2024 1315 by Olga Maguire RN  Outcome: Progressing  Goal: Verbalize understanding of risk factor reduction measures to prevent injury from fall in the home  12/5/2024 1315  by Olga Burnworth, RN  Outcome: Progressing  12/5/2024 1315 by Olga Maguire RN  Outcome: Progressing  Goal: Use assistive devices by end of the shift  12/5/2024 1315 by Olga Maguire RN  Outcome: Progressing  12/5/2024 1315 by Olga Maguire RN  Outcome: Progressing  Goal: Pace activities to prevent fatigue by end of the shift  12/5/2024 1315 by Olga Maguire RN  Outcome: Progressing  12/5/2024 1315 by Olga Maguire RN  Outcome: Progressing  Goal: Not fall by end of shift  12/5/2024 1315 by Olga Maguire RN  Outcome: Progressing  12/5/2024 1315 by Olga Maguire RN  Outcome: Progressing  Goal: Be free from injury by end of the shift  12/5/2024 1315 by Olga Maguire RN  Outcome: Progressing  12/5/2024 1315 by Olga Maguire RN  Outcome: Progressing     Problem: Nutrition  Goal: Oral intake greater 75%  12/5/2024 1315 by Olga Maguire RN  Outcome: Progressing  12/5/2024 1315 by Olga Maguire RN  Outcome: Progressing  Goal: Adequate PO fluid intake  12/5/2024 1315 by Olga Maguire RN  Outcome: Progressing  12/5/2024 1315 by Olga Maguire RN  Outcome: Progressing  Goal: Nutrition support is meeting 75% of nutrient needs  12/5/2024 1315 by Olga Maguire RN  Outcome: Progressing  12/5/2024 1315 by Olga Maguire RN  Outcome: Progressing  Goal: Tube feed tolerance  12/5/2024 1315 by Olga Maguire RN  Outcome: Progressing  12/5/2024 1315 by Olga Maguire RN  Outcome: Progressing  Goal: BG  mg/dL  12/5/2024 1315 by Olga Maguire RN  Outcome: Progressing  12/5/2024 1315 by Olga Maguire RN  Outcome: Progressing  Goal: Electrolytes WNL  12/5/2024 1315 by Olga Maguire RN  Outcome: Progressing  12/5/2024 1315 by Olga Maguire RN  Outcome: Progressing  Goal: Maintain stable weight  12/5/2024 1315 by Olga Maguire RN  Outcome: Progressing  12/5/2024 1315 by Olga Maguire, RN  Outcome: Progressing     Problem: Pain - Adult  Goal: Verbalizes/displays adequate  comfort level or baseline comfort level  12/5/2024 1315 by Olga Maguire RN  Outcome: Progressing  12/5/2024 1315 by Olga Maguire RN  Outcome: Progressing     Problem: Safety - Adult  Goal: Free from fall injury  12/5/2024 1315 by Olga Maguire RN  Outcome: Progressing  12/5/2024 1315 by Olga Maguire RN  Outcome: Progressing     Problem: Discharge Planning  Goal: Discharge to home or other facility with appropriate resources  12/5/2024 1315 by Olga Maguire RN  Outcome: Progressing  12/5/2024 1315 by Olga Maguire RN  Outcome: Progressing     Problem: Chronic Conditions and Co-morbidities  Goal: Patient's chronic conditions and co-morbidity symptoms are monitored and maintained or improved  12/5/2024 1315 by Olga Maguire RN  Outcome: Progressing  12/5/2024 1315 by Olga Maguire RN  Outcome: Progressing     Problem: Pain  Goal: Takes deep breaths with improved pain control throughout the shift  12/5/2024 1315 by Olga Maguire RN  Outcome: Progressing  12/5/2024 1315 by Olga Maguire RN  Outcome: Progressing  Goal: Turns in bed with improved pain control throughout the shift  12/5/2024 1315 by Olga Maguire RN  Outcome: Progressing  12/5/2024 1315 by Olga Maguire RN  Outcome: Progressing  Goal: Walks with improved pain control throughout the shift  12/5/2024 1315 by Olga Maguire RN  Outcome: Progressing  12/5/2024 1315 by Olga Maguire RN  Outcome: Progressing  Goal: Performs ADL's with improved pain control throughout shift  12/5/2024 1315 by Olga Maguire RN  Outcome: Progressing  12/5/2024 1315 by Olga Maguire RN  Outcome: Progressing  Goal: Participates in PT with improved pain control throughout the shift  12/5/2024 1315 by Olga Maguire RN  Outcome: Progressing  12/5/2024 1315 by Olga Maguire RN  Outcome: Progressing  Goal: Free from opioid side effects throughout the shift  12/5/2024 1315 by Olga Maguire RN  Outcome: Progressing  12/5/2024 1315 by  Olga Maguire RN  Outcome: Progressing  Goal: Free from acute confusion related to pain meds throughout the shift  12/5/2024 1315 by Olga Maguire RN  Outcome: Progressing  12/5/2024 1315 by Olga Maguire RN  Outcome: Progressing     Problem: Respiratory  Goal: Clear secretions with interventions this shift  12/5/2024 1315 by Olga Maguire RN  Outcome: Progressing  12/5/2024 1315 by Olga Maguire RN  Outcome: Progressing  Goal: Minimize anxiety/maximize coping throughout shift  12/5/2024 1315 by Olga Maguire RN  Outcome: Progressing  12/5/2024 1315 by Olga Maguire RN  Outcome: Progressing  Goal: Minimal/no exertional discomfort or dyspnea this shift  12/5/2024 1315 by Olga Maguire RN  Outcome: Progressing  12/5/2024 1315 by Olga Maguire RN  Outcome: Progressing  Goal: No signs of respiratory distress (eg. Use of accessory muscles. Peds grunting)  12/5/2024 1315 by Olga Maguire RN  Outcome: Progressing  12/5/2024 1315 by Olga Maugire RN  Outcome: Progressing  Goal: Patent airway maintained this shift  12/5/2024 1315 by Olga Maguire RN  Outcome: Progressing  12/5/2024 1315 by Olga Maguire RN  Outcome: Progressing  Goal: Tolerate mechanical ventilation evidenced by VS/agitation level this shift  12/5/2024 1315 by Olga Maguire RN  Outcome: Progressing  12/5/2024 1315 by Olga Maguire RN  Outcome: Progressing  Goal: Tolerate pulmonary toileting this shift  12/5/2024 1315 by Olga Maguire RN  Outcome: Progressing  12/5/2024 1315 by Olga Maguire RN  Outcome: Progressing  Goal: Verbalize decreased shortness of breath this shift  12/5/2024 1315 by Olga Maugire RN  Outcome: Progressing  12/5/2024 1315 by Olga Maguire RN  Outcome: Progressing  Goal: Wean oxygen to maintain O2 saturation per order/standard this shift  12/5/2024 1315 by Olga Maguire RN  Outcome: Progressing  12/5/2024 1315 by Olga Burnworth, RN  Outcome: Progressing  Goal: Increase self  care and/or family involvement in next 24 hours  12/5/2024 1315 by Olga Maguire RN  Outcome: Progressing  12/5/2024 1315 by Olga Maguire, RN  Outcome: Progressing

## 2024-12-06 VITALS
TEMPERATURE: 99 F | DIASTOLIC BLOOD PRESSURE: 56 MMHG | HEIGHT: 62 IN | RESPIRATION RATE: 20 BRPM | WEIGHT: 107.14 LBS | BODY MASS INDEX: 19.72 KG/M2 | HEART RATE: 79 BPM | OXYGEN SATURATION: 97 % | SYSTOLIC BLOOD PRESSURE: 113 MMHG

## 2024-12-06 PROCEDURE — 2500000002 HC RX 250 W HCPCS SELF ADMINISTERED DRUGS (ALT 637 FOR MEDICARE OP, ALT 636 FOR OP/ED): Performed by: HOSPITALIST

## 2024-12-06 PROCEDURE — 2500000005 HC RX 250 GENERAL PHARMACY W/O HCPCS: Performed by: HOSPITALIST

## 2024-12-06 PROCEDURE — 94640 AIRWAY INHALATION TREATMENT: CPT

## 2024-12-06 PROCEDURE — 94760 N-INVAS EAR/PLS OXIMETRY 1: CPT

## 2024-12-06 NOTE — NURSING NOTE
0730- Attempted to call report to Zbigniew    0830- Second attempt at report to Zbigniew    0876- Report given to Zbigniew, verified that transport is able to take her now to facility

## 2024-12-08 LAB
BACTERIA SPEC RESP CULT: ABNORMAL
BACTERIA SPEC RESP CULT: ABNORMAL
GRAM STN SPEC: ABNORMAL
GRAM STN SPEC: ABNORMAL

## 2024-12-10 NOTE — DOCUMENTATION CLARIFICATION NOTE
"    PATIENT:               ARISTIDES RAWLS  ACCT #:                  [id number]  MRN:                       [id number]  :                       1985  ADMIT DATE:       11/15/2024 3:48 PM  DISCH DATE:        2024 9:36 AM  RESPONDING PROVIDER #:        [id number]          PROVIDER RESPONSE TEXT:    Mucus plug in trachea ruled in for this admission    CDI QUERY TEXT:    Clarification    Based on your assessment of the patient and the clinical information, please provide the requested documentation by clicking on the appropriate radio button and enter any additional information if prompted.    Question: Please further clarify the diagnosis of  likely mucus plug as    When answering this query, please exercise your independent professional judgment. The fact that a question is being asked, does not imply that any particular answer is desired or expected.    The patient's clinical indicators include:  Clinical Information:  39 y.o. female on day 12 of admission presenting with  increased secretions in her trach and seizure    Clinical Indicators:   Significant event by Dr. Zamora \"Responded to a rapid response call at about 0545 hrs.  Patient has a tracheostomy and PEG tube.  She was on respiratory distress.  She was found to be struggling for breathing.  Did not appear cyanotic, no diaphoresis.  SOP2 was low 90s.\" \" The respiratory therapist helped us to have deep suctioning through the trach collar.  Multiple suctions were given, thick green mucus came out in the suction tube\" \"Most likely patient had mucous plugging and did not get suctions prior at regular intervals.\"     by Dr. Sullivan \"Rapid response was called overnight due to respiratory distress. Distress was alleviated following deep suctioning. \"    Treatment: Respiratory Therapy,   Deep suctioning    Risk Factors: Trach collar,  increased secretions,   respiratory distress  Options provided:  -- Mucus plug in trachea ruled in for this " admission  -- Mucus plug ruled out after workup  -- Other - I will add my own diagnosis  -- Refer to Clinical Documentation Reviewer    Query created by: Genie Lentz on 12/6/2024 9:36 AM      Electronically signed by:  ANTON ELI MD 12/10/2024 2:50 PM

## 2024-12-12 ENCOUNTER — NURSING HOME VISIT (OUTPATIENT)
Dept: POST ACUTE CARE | Facility: EXTERNAL LOCATION | Age: 39
End: 2024-12-12
Payer: MEDICAID

## 2024-12-12 DIAGNOSIS — Z78.9 SOCIAL WORKER INVOLVED IN PATIENT'S CARE: ICD-10-CM

## 2024-12-12 DIAGNOSIS — I10 ESSENTIAL (PRIMARY) HYPERTENSION: ICD-10-CM

## 2024-12-12 DIAGNOSIS — Z93.1 GASTROSTOMY STATUS (MULTI): ICD-10-CM

## 2024-12-12 DIAGNOSIS — Z43.0 TRACHEOSTOMY CARE (MULTI): Primary | ICD-10-CM

## 2024-12-12 DIAGNOSIS — F71 MODERATE INTELLECTUAL DISABILITIES: ICD-10-CM

## 2024-12-12 DIAGNOSIS — R56.9 SEIZURE (MULTI): ICD-10-CM

## 2024-12-12 DIAGNOSIS — R11.12 PROJECTILE VOMITING WITH NAUSEA: ICD-10-CM

## 2024-12-12 PROCEDURE — 99306 1ST NF CARE HIGH MDM 50: CPT | Performed by: STUDENT IN AN ORGANIZED HEALTH CARE EDUCATION/TRAINING PROGRAM

## 2024-12-12 NOTE — LETTER
Patient: Carolina Chowdhury  : 1985    Encounter Date: 2024    Subjective  Patient ID: Carolina Chowdhury is a 39 y.o. female.      Patient is a 39-year-old female with history of hypertension, intellectual disability, PEG tube, PTSD, GERD who presents for increased tracheostomy output.  Patient was sent by her group home.  Patient is nonverbal.  No reported fevers, chills.  Patient does not provide significant history.  She does not keep that she feels mildly cold and would like to eat.  Patient was further admitted for issues with PEG tube malfunction as well as tracheostomy care and increased secretions.  Was overall medically optimized, and was decided to be discharged to rehab facility in stable condition.  On evaluation, patient states no acute issues or concerns however is having some difficulty providing history.  Staff at bedside, reported patient has been having some persistent vomiting however is getting no residuals with regards to tube feedings.  In addition, also has been somewhat agitated at times and continuously tries to remove ostomy bag.  Otherwise, no additional issues.  States no additional issues at this time.          Review of Systems   Reason unable to perform ROS: difficulty to obtain secondary to non verbal, taken from physical cues.   Constitutional: Negative.    HENT: Negative.     Respiratory:  Positive for cough.    Cardiovascular: Negative.    Gastrointestinal:  Positive for abdominal pain, nausea and vomiting.   Musculoskeletal: Negative.    Neurological: Negative.    Psychiatric/Behavioral:  Positive for agitation and behavioral problems.        ObjectiveVitals Reviewed via facility EMR   Physical Exam  Constitutional:       General: She is not in acute distress.     Appearance: She is ill-appearing.   Eyes:      Pupils: Pupils are equal, round, and reactive to light.   Cardiovascular:      Rate and Rhythm: Normal rate and regular rhythm.      Pulses: Normal pulses.      Heart  sounds: No murmur heard.  Pulmonary:      Effort: No respiratory distress.      Breath sounds: No wheezing.      Comments: Trach present  Abdominal:      General: Abdomen is flat. Bowel sounds are normal. There is no distension.   Musculoskeletal:      Right lower leg: No edema.      Left lower leg: No edema.   Skin:     General: Skin is warm and dry.   Neurological:      Mental Status: She is alert. Mental status is at baseline.      Cranial Nerves: No cranial nerve deficit.      Motor: No weakness.   Psychiatric:         Mood and Affect: Mood normal.      Comments: Agitated, difficult to redirect         Assessment/Plan  Diagnoses and all orders for this visit:  Tracheostomy care (Multi)   involved in patient's care  Seizure (Multi)  Projectile vomiting with nausea  Essential (primary) hypertension  Gastrostomy status (Multi)  Moderate intellectual disabilities    Patient seen and examined at bedside.  Overall medically stable, hospital course reviewed and medications reviewed and reconciled.  Does have persistent issues with mucous plugging, we will need to ensure appropriate care from respiratory therapy.  Appreciate recommendations.  Otherwise continue optimization with physical therapy closely monitor mentation.  Appreciate psych recommendations.  Appreciate pulm recommendations, discussed care plan with staff.  Will follow-up on KUB for underlying nausea and vomiting.  Discontinue metoclopramide start Phenergan for persistent vomiting.    Reviewed and approved by SCOT TAYLOR on 12/14/24 at 10:42 AM.         Electronically Signed By: Scot Taylor DO   12/14/24 10:43 AM

## 2024-12-14 PROBLEM — R11.2 NAUSEA AND VOMITING: Status: ACTIVE | Noted: 2024-12-14

## 2024-12-14 ASSESSMENT — ENCOUNTER SYMPTOMS
NAUSEA: 1
MUSCULOSKELETAL NEGATIVE: 1
VOMITING: 1
ABDOMINAL PAIN: 1
AGITATION: 1
CARDIOVASCULAR NEGATIVE: 1
CONSTITUTIONAL NEGATIVE: 1
COUGH: 1
NEUROLOGICAL NEGATIVE: 1

## 2024-12-14 NOTE — PROGRESS NOTES
Subjective   Patient ID: Carolina Chowdhury is a 39 y.o. female.      Patient is a 39-year-old female with history of hypertension, intellectual disability, PEG tube, PTSD, GERD who presents for increased tracheostomy output.  Patient was sent by her group home.  Patient is nonverbal.  No reported fevers, chills.  Patient does not provide significant history.  She does not keep that she feels mildly cold and would like to eat.  Patient was further admitted for issues with PEG tube malfunction as well as tracheostomy care and increased secretions.  Was overall medically optimized, and was decided to be discharged to rehab facility in stable condition.  On evaluation, patient states no acute issues or concerns however is having some difficulty providing history.  Staff at bedside, reported patient has been having some persistent vomiting however is getting no residuals with regards to tube feedings.  In addition, also has been somewhat agitated at times and continuously tries to remove ostomy bag.  Otherwise, no additional issues.  States no additional issues at this time.          Review of Systems   Reason unable to perform ROS: difficulty to obtain secondary to non verbal, taken from physical cues.   Constitutional: Negative.    HENT: Negative.     Respiratory:  Positive for cough.    Cardiovascular: Negative.    Gastrointestinal:  Positive for abdominal pain, nausea and vomiting.   Musculoskeletal: Negative.    Neurological: Negative.    Psychiatric/Behavioral:  Positive for agitation and behavioral problems.        Objective Vitals Reviewed via facility EMR   Physical Exam  Constitutional:       General: She is not in acute distress.     Appearance: She is ill-appearing.   Eyes:      Pupils: Pupils are equal, round, and reactive to light.   Cardiovascular:      Rate and Rhythm: Normal rate and regular rhythm.      Pulses: Normal pulses.      Heart sounds: No murmur heard.  Pulmonary:      Effort: No respiratory  distress.      Breath sounds: No wheezing.      Comments: Trach present  Abdominal:      General: Abdomen is flat. Bowel sounds are normal. There is no distension.   Musculoskeletal:      Right lower leg: No edema.      Left lower leg: No edema.   Skin:     General: Skin is warm and dry.   Neurological:      Mental Status: She is alert. Mental status is at baseline.      Cranial Nerves: No cranial nerve deficit.      Motor: No weakness.   Psychiatric:         Mood and Affect: Mood normal.      Comments: Agitated, difficult to redirect         Assessment/Plan   Diagnoses and all orders for this visit:  Tracheostomy care (Multi)   involved in patient's care  Seizure (Multi)  Projectile vomiting with nausea  Essential (primary) hypertension  Gastrostomy status (Multi)  Moderate intellectual disabilities    Patient seen and examined at bedside.  Overall medically stable, hospital course reviewed and medications reviewed and reconciled.  Does have persistent issues with mucous plugging, we will need to ensure appropriate care from respiratory therapy.  Appreciate recommendations.  Otherwise continue optimization with physical therapy closely monitor mentation.  Appreciate psych recommendations.  Appreciate pulm recommendations, discussed care plan with staff.  Will follow-up on KUB for underlying nausea and vomiting.  Discontinue metoclopramide start Phenergan for persistent vomiting.    Reviewed and approved by MAURILIO TAYLOR on 12/14/24 at 10:42 AM.

## 2024-12-17 ENCOUNTER — NURSING HOME VISIT (OUTPATIENT)
Dept: POST ACUTE CARE | Facility: EXTERNAL LOCATION | Age: 39
End: 2024-12-17
Payer: MEDICAID

## 2024-12-17 DIAGNOSIS — F71 MODERATE INTELLECTUAL DISABILITIES: ICD-10-CM

## 2024-12-17 DIAGNOSIS — I10 ESSENTIAL (PRIMARY) HYPERTENSION: Primary | ICD-10-CM

## 2024-12-17 DIAGNOSIS — Z93.1 GASTROSTOMY STATUS (MULTI): ICD-10-CM

## 2024-12-17 DIAGNOSIS — R56.9 SEIZURE (MULTI): ICD-10-CM

## 2024-12-17 DIAGNOSIS — Z43.0 TRACHEOSTOMY CARE (MULTI): ICD-10-CM

## 2024-12-17 PROCEDURE — 99308 SBSQ NF CARE LOW MDM 20: CPT | Performed by: STUDENT IN AN ORGANIZED HEALTH CARE EDUCATION/TRAINING PROGRAM

## 2024-12-17 NOTE — LETTER
Patient: Carolina Chowdhury  : 1985    Encounter Date: 2024    Subjective  Patient ID: Carolina Chowdhury is a 39 y.o. female.    Patient seen on routine evaluation. Unable to provide much history but with hand gestures and head movements states that she is overall doing well with no issues. Abdominal pain has improved. Otherwise states no additional issues.        Review of Systems   Constitutional: Negative.    HENT: Negative.     Respiratory: Negative.     Cardiovascular: Negative.    Gastrointestinal: Negative.    Musculoskeletal: Negative.    Neurological:  Positive for weakness.   Psychiatric/Behavioral:  Positive for agitation.        ObjectiveVitals Reviewed via facility EMR   Physical Exam  Constitutional:       General: She is not in acute distress.     Appearance: She is ill-appearing.      Comments: Agitated, difficult to redirect, unable to provide history, physical cues   Eyes:      Pupils: Pupils are equal, round, and reactive to light.   Cardiovascular:      Rate and Rhythm: Normal rate and regular rhythm.      Pulses: Normal pulses.      Heart sounds: No murmur heard.  Pulmonary:      Effort: No respiratory distress.      Breath sounds: No wheezing.      Comments: Trach present  Abdominal:      General: Abdomen is flat. Bowel sounds are normal. There is no distension.   Musculoskeletal:      Right lower leg: No edema.      Left lower leg: No edema.   Skin:     General: Skin is warm and dry.   Neurological:      Mental Status: She is alert. Mental status is at baseline.      Cranial Nerves: No cranial nerve deficit.      Motor: Weakness present.   Psychiatric:         Mood and Affect: Mood normal.         Behavior: Behavior normal.         Assessment/Plan  Diagnoses and all orders for this visit:  Essential (primary) hypertension  Tracheostomy care (Multi)  Gastrostomy status (Multi)  Moderate intellectual disabilities  Seizure (Multi)      Pt seen and examined on routine evaluation. Continue  supportive care and appreciate respiratory recommendations for tracheostomy status. Otherwise no additional issues. Labs and vitals reviewed and no issues noted. Discussed care plan with staff.     Reviewed and approved by SCOT TAYLOR on 12/18/24 at 10:56 PM.       Electronically Signed By: Scot Taylor DO   12/18/24 10:56 PM

## 2024-12-18 ASSESSMENT — ENCOUNTER SYMPTOMS
CONSTITUTIONAL NEGATIVE: 1
GASTROINTESTINAL NEGATIVE: 1
WEAKNESS: 1
CARDIOVASCULAR NEGATIVE: 1
AGITATION: 1
MUSCULOSKELETAL NEGATIVE: 1
RESPIRATORY NEGATIVE: 1

## 2024-12-19 NOTE — PROGRESS NOTES
Subjective   Patient ID: Carolina Chowdhury is a 39 y.o. female.    Patient seen on routine evaluation. Unable to provide much history but with hand gestures and head movements states that she is overall doing well with no issues. Abdominal pain has improved. Otherwise states no additional issues.        Review of Systems   Constitutional: Negative.    HENT: Negative.     Respiratory: Negative.     Cardiovascular: Negative.    Gastrointestinal: Negative.    Musculoskeletal: Negative.    Neurological:  Positive for weakness.   Psychiatric/Behavioral:  Positive for agitation.        Objective Vitals Reviewed via facility EMR   Physical Exam  Constitutional:       General: She is not in acute distress.     Appearance: She is ill-appearing.      Comments: Agitated, difficult to redirect, unable to provide history, physical cues   Eyes:      Pupils: Pupils are equal, round, and reactive to light.   Cardiovascular:      Rate and Rhythm: Normal rate and regular rhythm.      Pulses: Normal pulses.      Heart sounds: No murmur heard.  Pulmonary:      Effort: No respiratory distress.      Breath sounds: No wheezing.      Comments: Trach present  Abdominal:      General: Abdomen is flat. Bowel sounds are normal. There is no distension.   Musculoskeletal:      Right lower leg: No edema.      Left lower leg: No edema.   Skin:     General: Skin is warm and dry.   Neurological:      Mental Status: She is alert. Mental status is at baseline.      Cranial Nerves: No cranial nerve deficit.      Motor: Weakness present.   Psychiatric:         Mood and Affect: Mood normal.         Behavior: Behavior normal.         Assessment/Plan   Diagnoses and all orders for this visit:  Essential (primary) hypertension  Tracheostomy care (Multi)  Gastrostomy status (Multi)  Moderate intellectual disabilities  Seizure (Multi)      Pt seen and examined on routine evaluation. Continue supportive care and appreciate respiratory recommendations for  tracheostomy status. Otherwise no additional issues. Labs and vitals reviewed and no issues noted. Discussed care plan with staff.     Reviewed and approved by MAURILIO TAYLOR on 12/18/24 at 10:56 PM.

## 2025-01-09 ENCOUNTER — NURSING HOME VISIT (OUTPATIENT)
Dept: POST ACUTE CARE | Facility: EXTERNAL LOCATION | Age: 40
End: 2025-01-09
Payer: MEDICAID

## 2025-01-09 DIAGNOSIS — Z78.9 SOCIAL WORKER INVOLVED IN PATIENT'S CARE: ICD-10-CM

## 2025-01-09 DIAGNOSIS — I10 ESSENTIAL (PRIMARY) HYPERTENSION: Primary | ICD-10-CM

## 2025-01-09 DIAGNOSIS — R11.12 PROJECTILE VOMITING WITH NAUSEA: ICD-10-CM

## 2025-01-09 DIAGNOSIS — R45.1 AGITATION: ICD-10-CM

## 2025-01-09 DIAGNOSIS — Z43.0 TRACHEOSTOMY CARE (MULTI): ICD-10-CM

## 2025-01-09 DIAGNOSIS — Z93.1 GASTROSTOMY STATUS (MULTI): ICD-10-CM

## 2025-01-09 DIAGNOSIS — F71 MODERATE INTELLECTUAL DISABILITIES: ICD-10-CM

## 2025-01-09 PROCEDURE — 99308 SBSQ NF CARE LOW MDM 20: CPT | Performed by: STUDENT IN AN ORGANIZED HEALTH CARE EDUCATION/TRAINING PROGRAM

## 2025-01-09 ASSESSMENT — ENCOUNTER SYMPTOMS
NERVOUS/ANXIOUS: 1
HYPERACTIVE: 1
MUSCULOSKELETAL NEGATIVE: 1
GASTROINTESTINAL NEGATIVE: 1
NEUROLOGICAL NEGATIVE: 1
AGITATION: 1
RESPIRATORY NEGATIVE: 1
CARDIOVASCULAR NEGATIVE: 1

## 2025-01-09 NOTE — LETTER
Patient: Carolina Chowdhury  : 1985    Encounter Date: 2025    Subjective  Patient ID: Carolina Chowdhury is a 39 y.o. female.    Patient seen and examined on routine evaluation.  Does have difficulty providing history however staff has reported that she continues to have episodes of vomiting and overall upset stomach.  In addition, continues take off her ostomy bag.  Otherwise, respiratory status is overall been stable.  Denies any additional issues or concerns.        Review of Systems   HENT: Negative.     Respiratory: Negative.     Cardiovascular: Negative.    Gastrointestinal: Negative.    Musculoskeletal: Negative.    Neurological: Negative.    Psychiatric/Behavioral:  Positive for agitation and behavioral problems. The patient is nervous/anxious and is hyperactive.        ObjectiveVitals Reviewed via facility EMR   Physical Exam  Constitutional:       General: She is not in acute distress.     Appearance: She is ill-appearing.   Eyes:      Pupils: Pupils are equal, round, and reactive to light.   Cardiovascular:      Rate and Rhythm: Normal rate and regular rhythm.      Pulses: Normal pulses.      Heart sounds: No murmur heard.  Pulmonary:      Effort: No respiratory distress.      Breath sounds: No wheezing.   Abdominal:      General: Abdomen is flat. Bowel sounds are normal. There is no distension.   Musculoskeletal:      Right lower leg: No edema.      Left lower leg: No edema.   Skin:     General: Skin is warm and dry.   Neurological:      Mental Status: She is alert. Mental status is at baseline.      Cranial Nerves: No cranial nerve deficit.      Motor: Weakness present.   Psychiatric:         Mood and Affect: Mood normal.         Behavior: Behavior normal.         Assessment/Plan  Diagnoses and all orders for this visit:  Essential (primary) hypertension  Tracheostomy care (Multi)  Gastrostomy status (Multi)  Projectile vomiting with nausea  Moderate intellectual disabilities    involved in patient's care  Agitation      Patient seen and examined at bedside.  Overall medically stable, I do believe that a lot of the nausea and vomiting are more behaviors from a psychological standpoint versus a medical standpoint.  Continue to optimize from a medical standpoint.  We will consult psych secondary to this to see if we can help with medication management.  Otherwise continue supportive care appreciate respiratory recommendations no change in medications at this time.    Reviewed and approved by SCOT TAYLOR on 1/9/25 at 10:21 PM.       Electronically Signed By: Scot Taylor DO   1/9/25 10:22 PM

## 2025-01-10 NOTE — PROGRESS NOTES
Subjective   Patient ID: Carolina Chowdhruy is a 39 y.o. female.    Patient seen and examined on routine evaluation.  Does have difficulty providing history however staff has reported that she continues to have episodes of vomiting and overall upset stomach.  In addition, continues take off her ostomy bag.  Otherwise, respiratory status is overall been stable.  Denies any additional issues or concerns.        Review of Systems   HENT: Negative.     Respiratory: Negative.     Cardiovascular: Negative.    Gastrointestinal: Negative.    Musculoskeletal: Negative.    Neurological: Negative.    Psychiatric/Behavioral:  Positive for agitation and behavioral problems. The patient is nervous/anxious and is hyperactive.        Objective Vitals Reviewed via facility EMR   Physical Exam  Constitutional:       General: She is not in acute distress.     Appearance: She is ill-appearing.   Eyes:      Pupils: Pupils are equal, round, and reactive to light.   Cardiovascular:      Rate and Rhythm: Normal rate and regular rhythm.      Pulses: Normal pulses.      Heart sounds: No murmur heard.  Pulmonary:      Effort: No respiratory distress.      Breath sounds: No wheezing.   Abdominal:      General: Abdomen is flat. Bowel sounds are normal. There is no distension.   Musculoskeletal:      Right lower leg: No edema.      Left lower leg: No edema.   Skin:     General: Skin is warm and dry.   Neurological:      Mental Status: She is alert. Mental status is at baseline.      Cranial Nerves: No cranial nerve deficit.      Motor: Weakness present.   Psychiatric:         Mood and Affect: Mood normal.         Behavior: Behavior normal.         Assessment/Plan   Diagnoses and all orders for this visit:  Essential (primary) hypertension  Tracheostomy care (Multi)  Gastrostomy status (Multi)  Projectile vomiting with nausea  Moderate intellectual disabilities   involved in patient's care  Agitation      Patient seen and examined at  bedside.  Overall medically stable, I do believe that a lot of the nausea and vomiting are more behaviors from a psychological standpoint versus a medical standpoint.  Continue to optimize from a medical standpoint.  We will consult psych secondary to this to see if we can help with medication management.  Otherwise continue supportive care appreciate respiratory recommendations no change in medications at this time.    Reviewed and approved by MAURILIO TAYLOR on 1/9/25 at 10:21 PM.

## 2025-01-31 ENCOUNTER — NURSING HOME VISIT (OUTPATIENT)
Dept: POST ACUTE CARE | Facility: EXTERNAL LOCATION | Age: 40
End: 2025-01-31
Payer: MEDICAID

## 2025-01-31 DIAGNOSIS — I10 ESSENTIAL (PRIMARY) HYPERTENSION: Primary | ICD-10-CM

## 2025-01-31 DIAGNOSIS — R11.12 PROJECTILE VOMITING WITH NAUSEA: ICD-10-CM

## 2025-01-31 DIAGNOSIS — R45.1 AGITATION: ICD-10-CM

## 2025-01-31 DIAGNOSIS — F71 MODERATE INTELLECTUAL DISABILITIES: ICD-10-CM

## 2025-01-31 PROCEDURE — 99308 SBSQ NF CARE LOW MDM 20: CPT | Performed by: STUDENT IN AN ORGANIZED HEALTH CARE EDUCATION/TRAINING PROGRAM

## 2025-01-31 ASSESSMENT — ENCOUNTER SYMPTOMS
RESPIRATORY NEGATIVE: 1
CARDIOVASCULAR NEGATIVE: 1
PSYCHIATRIC NEGATIVE: 1
MUSCULOSKELETAL NEGATIVE: 1
NEUROLOGICAL NEGATIVE: 1
GASTROINTESTINAL NEGATIVE: 1
CONSTITUTIONAL NEGATIVE: 1

## 2025-01-31 NOTE — LETTER
Patient: Carolina Chowdhury  : 1985    Encounter Date: 2025    Subjective  Patient ID: Carolina Chowdhury is a 39 y.o. female.    Patient seen and examined on routine evaluation. With regards to her behaviors they have been still been present but stable and likely at baseline. In addition, has been weaned off trach and patient has essentially been back to her baseline o2. Otherwise on evaluation, patient endorses persistent N/V however this is likely dietary related and behavior related. Otherwise no additional issues at this time.         Review of Systems   Reason unable to perform ROS: difficult to obtain due to lack of communication.   Constitutional: Negative.    HENT: Negative.     Respiratory: Negative.     Cardiovascular: Negative.    Gastrointestinal: Negative.    Musculoskeletal: Negative.    Neurological: Negative.    Psychiatric/Behavioral: Negative.         ObjectiveVitals Reviewed via facility EMR   Physical Exam  Constitutional:       General: She is not in acute distress.     Appearance: She is not ill-appearing.      Comments: Intermittently agitated but stable   Eyes:      Pupils: Pupils are equal, round, and reactive to light.   Cardiovascular:      Rate and Rhythm: Normal rate and regular rhythm.      Pulses: Normal pulses.      Heart sounds: No murmur heard.  Pulmonary:      Effort: No respiratory distress.      Breath sounds: No wheezing.   Abdominal:      General: Abdomen is flat. Bowel sounds are normal. There is no distension.   Musculoskeletal:      Right lower leg: No edema.      Left lower leg: No edema.   Skin:     General: Skin is warm and dry.   Neurological:      Mental Status: She is alert. Mental status is at baseline.      Cranial Nerves: No cranial nerve deficit.      Motor: Weakness present.   Psychiatric:         Mood and Affect: Mood normal.         Behavior: Behavior normal.         Assessment/Plan  Diagnoses and all orders for this visit:  Essential (primary)  hypertension  Projectile vomiting with nausea  Moderate intellectual disabilities  Agitation    Patient seen and examined at bedside. Overall medically stable. Continue to closely monitor mentation and behaviors. Otherwise continue supporotive care. Discussed discharge planning with facility as patient likely is medically stable to go back to group home as there are no respiratory issues. Behavior issues are at baseline and can be followed as an outpatient. Otherwise no additional issues.     Reviewed and approved by SCOT TALYOR on 1/31/25 at 10:08 PM.       Electronically Signed By: Scot Taylor DO   1/31/25 10:09 PM

## 2025-02-06 ENCOUNTER — NURSING HOME VISIT (OUTPATIENT)
Dept: POST ACUTE CARE | Facility: EXTERNAL LOCATION | Age: 40
End: 2025-02-06
Payer: MEDICAID

## 2025-02-06 DIAGNOSIS — G80.9 CEREBRAL PALSY, UNSPECIFIED TYPE (MULTI): ICD-10-CM

## 2025-02-06 DIAGNOSIS — F60.3 BORDERLINE PERSONALITY DISORDER (MULTI): Primary | ICD-10-CM

## 2025-02-06 DIAGNOSIS — J18.9 PNEUMONIA DUE TO INFECTIOUS ORGANISM, UNSPECIFIED LATERALITY, UNSPECIFIED PART OF LUNG: ICD-10-CM

## 2025-02-06 DIAGNOSIS — Z43.0 TRACHEOSTOMY CARE (MULTI): ICD-10-CM

## 2025-02-06 DIAGNOSIS — I10 ESSENTIAL (PRIMARY) HYPERTENSION: ICD-10-CM

## 2025-02-06 DIAGNOSIS — N30.00 ACUTE CYSTITIS WITHOUT HEMATURIA: ICD-10-CM

## 2025-02-06 PROBLEM — R56.9 SEIZURE (MULTI): Status: RESOLVED | Noted: 2024-11-18 | Resolved: 2025-02-06

## 2025-02-06 PROCEDURE — 99309 SBSQ NF CARE MODERATE MDM 30: CPT | Performed by: STUDENT IN AN ORGANIZED HEALTH CARE EDUCATION/TRAINING PROGRAM

## 2025-02-06 ASSESSMENT — ENCOUNTER SYMPTOMS
CONSTITUTIONAL NEGATIVE: 1
WEAKNESS: 1
FATIGUE: 0
GASTROINTESTINAL NEGATIVE: 1
COUGH: 1
PSYCHIATRIC NEGATIVE: 1
CARDIOVASCULAR NEGATIVE: 1
MUSCULOSKELETAL NEGATIVE: 1

## 2025-02-06 NOTE — LETTER
Patient: Carolina Chowdhury  : 1985    Encounter Date: 2025    Subjective  Patient ID: Carolina Chowdhury is a 39 y.o. female.      Patient seen and examined on sick visit.  Since last evaluation, patient was noted to be diagnosed with a UTI.  Has been on Cipro secondary to this and cultures do reveal some sensitivity to this.  However, patient recently had an aspiration episode, and subsequent checks x-ray was positive for pneumonia.  Otherwise, behaviors have overall been stable, social work working on discharge planning.        Review of Systems   Constitutional: Negative.  Negative for fatigue.   HENT: Negative.     Respiratory:  Positive for cough.    Cardiovascular: Negative.    Gastrointestinal: Negative.    Musculoskeletal: Negative.    Neurological:  Positive for weakness.   Psychiatric/Behavioral: Negative.         ObjectiveVitals Reviewed via facility EMR   Physical Exam  Constitutional:       General: She is not in acute distress.     Appearance: She is not ill-appearing.   Eyes:      Pupils: Pupils are equal, round, and reactive to light.   Cardiovascular:      Rate and Rhythm: Normal rate and regular rhythm.      Pulses: Normal pulses.      Heart sounds: No murmur heard.  Pulmonary:      Effort: No respiratory distress.      Breath sounds: No wheezing.      Comments: Slight dec breath sounds  Abdominal:      General: Abdomen is flat. Bowel sounds are normal. There is no distension.   Musculoskeletal:      Right lower leg: No edema.      Left lower leg: No edema.   Skin:     General: Skin is warm and dry.   Neurological:      Mental Status: She is alert. Mental status is at baseline.      Cranial Nerves: No cranial nerve deficit.      Motor: Weakness present.   Psychiatric:         Mood and Affect: Mood normal.         Behavior: Behavior normal.         Assessment/Plan  Diagnoses and all orders for this visit:  Borderline personality disorder (Multi)  Cerebral palsy, unspecified type  (Multi)  Essential (primary) hypertension  Tracheostomy care (Multi)  Acute cystitis without hematuria  Pneumonia due to infectious organism, unspecified laterality, unspecified part of lung      Patient seen and examined at bedside.  Respiratory status has seemed to worsen since previous evaluation, and is being treated for UTI appropriately.  Due to patient having concurrent UTI and likely aspiration pneumonia, will transition antibiotics to Levaquin as patient does have a penicillin allergy.  Sensitivities from urine culture reviewed and is sensitive to Levaquin.  Otherwise, continue to monitor mood and behavior.  No additional issues at this time.    Reviewed and approved by SCOT TAYLOR on 2/6/25 at 10:03 PM.       Electronically Signed By: Scot Taylor DO   2/6/25 10:04 PM

## 2025-02-07 NOTE — PROGRESS NOTES
Subjective   Patient ID: Carolina Chowdhury is a 39 y.o. female.      Patient seen and examined on sick visit.  Since last evaluation, patient was noted to be diagnosed with a UTI.  Has been on Cipro secondary to this and cultures do reveal some sensitivity to this.  However, patient recently had an aspiration episode, and subsequent checks x-ray was positive for pneumonia.  Otherwise, behaviors have overall been stable, social work working on discharge planning.        Review of Systems   Constitutional: Negative.  Negative for fatigue.   HENT: Negative.     Respiratory:  Positive for cough.    Cardiovascular: Negative.    Gastrointestinal: Negative.    Musculoskeletal: Negative.    Neurological:  Positive for weakness.   Psychiatric/Behavioral: Negative.         Objective Vitals Reviewed via facility EMR   Physical Exam  Constitutional:       General: She is not in acute distress.     Appearance: She is not ill-appearing.   Eyes:      Pupils: Pupils are equal, round, and reactive to light.   Cardiovascular:      Rate and Rhythm: Normal rate and regular rhythm.      Pulses: Normal pulses.      Heart sounds: No murmur heard.  Pulmonary:      Effort: No respiratory distress.      Breath sounds: No wheezing.      Comments: Slight dec breath sounds  Abdominal:      General: Abdomen is flat. Bowel sounds are normal. There is no distension.   Musculoskeletal:      Right lower leg: No edema.      Left lower leg: No edema.   Skin:     General: Skin is warm and dry.   Neurological:      Mental Status: She is alert. Mental status is at baseline.      Cranial Nerves: No cranial nerve deficit.      Motor: Weakness present.   Psychiatric:         Mood and Affect: Mood normal.         Behavior: Behavior normal.         Assessment/Plan   Diagnoses and all orders for this visit:  Borderline personality disorder (Multi)  Cerebral palsy, unspecified type (Multi)  Essential (primary) hypertension  Tracheostomy care (Multi)  Acute cystitis  without hematuria  Pneumonia due to infectious organism, unspecified laterality, unspecified part of lung      Patient seen and examined at bedside.  Respiratory status has seemed to worsen since previous evaluation, and is being treated for UTI appropriately.  Due to patient having concurrent UTI and likely aspiration pneumonia, will transition antibiotics to Levaquin as patient does have a penicillin allergy.  Sensitivities from urine culture reviewed and is sensitive to Levaquin.  Otherwise, continue to monitor mood and behavior.  No additional issues at this time.    Reviewed and approved by MAURILIO TAYLOR on 2/6/25 at 10:03 PM.

## (undated) DEVICE — BRUSH ENDO CLN L90.5IN SHTH DIA1.7MM BRIST DIA5-7MM 2-6MM

## (undated) DEVICE — SINGLE PORT MANIFOLD: Brand: NEPTUNE 2

## (undated) DEVICE — TUBE SET 96 MM 64 MM H2O PERISTALTIC STD AUX CHANNEL

## (undated) DEVICE — PEG KIT STD 20 FR PUSH W/ ENFIT ENDOVIVE

## (undated) DEVICE — Device: Brand: ENDO SMARTCAP

## (undated) DEVICE — APPLICATOR MEDICATED 10.5 CC SOLUTION HI LT ORNG CHLORAPREP

## (undated) DEVICE — ADAPTER FLSH PMP FLD MGMT GI IRRIG OFP 2 DISPOSABLE

## (undated) DEVICE — RESTRAINT EXTREMITY LIMB HOLDER SFT FOAM SINGLE STRP DISP

## (undated) DEVICE — STERILE POLYISOPRENE POWDER-FREE SURGICAL GLOVES: Brand: PROTEXIS

## (undated) DEVICE — COVER,MAYO STAND,STERILE: Brand: MEDLINE

## (undated) DEVICE — TUBING IRRIGATION 140/160/180/190 SER GI ENDOSCP SMARTCAP

## (undated) DEVICE — ENDOVIVE SFT PEG KIT PULL WENFIT 20F BX2

## (undated) DEVICE — CONMED SCOPE SAVER BITE BLOCK, 20X27 MM: Brand: SCOPE SAVER

## (undated) DEVICE — TUBING, SUCTION, 9/32" X 12', STRAIGHT: Brand: MEDLINE INDUSTRIES, INC.

## (undated) DEVICE — ENDO CARRY-ON PROCEDURE KIT INCLUDES LUBRICANT, DEFENDO OLYMPUS AIR, WATER, SUCTION, BIOPSY VALVE KIT, ENZYMATIC SPONGE, AND BASIN.: Brand: ENDO CARRY-ON PROCEDURE KIT

## (undated) DEVICE — Device

## (undated) DEVICE — BRUSH ENDO COMBO

## (undated) DEVICE — ENDO CARRY-ON PROCEDURE KIT: Brand: ENDO CARRY-ON PROCEDURE KIT